# Patient Record
Sex: MALE | Race: BLACK OR AFRICAN AMERICAN | Employment: UNEMPLOYED | URBAN - METROPOLITAN AREA
[De-identification: names, ages, dates, MRNs, and addresses within clinical notes are randomized per-mention and may not be internally consistent; named-entity substitution may affect disease eponyms.]

---

## 2017-01-09 ENCOUNTER — HOSPITAL ENCOUNTER (OUTPATIENT)
Dept: LAB | Age: 62
Discharge: HOME | End: 2017-01-09

## 2017-01-09 LAB — BNP SERPL-MCNC: 855 PG/ML (ref 0–100)

## 2017-01-09 PROCEDURE — 83880 ASSAY OF NATRIURETIC PEPTIDE: CPT | Performed by: INTERNAL MEDICINE

## 2017-01-25 RX ORDER — CYCLOBENZAPRINE HCL 10 MG
TABLET ORAL
Qty: 90 TAB | Refills: 0 | Status: SHIPPED | OUTPATIENT
Start: 2017-01-25 | End: 2018-03-28 | Stop reason: SDUPTHER

## 2017-02-14 ENCOUNTER — TELEPHONE (OUTPATIENT)
Dept: SURGERY | Age: 62
End: 2017-02-14

## 2017-02-14 NOTE — TELEPHONE ENCOUNTER
Mr Sweetie Dada has appt this afternoon with [facility]. Faxed office notes dated 12/8/16, 10/27/16, 8/4/16 & op note dated 10/11/16 to [phone], confirmation received.

## 2017-03-24 ENCOUNTER — HOSPITAL ENCOUNTER (OUTPATIENT)
Dept: LAB | Age: 62
Discharge: HOME | End: 2017-03-24

## 2017-03-24 LAB — BNP SERPL-MCNC: 889 PG/ML (ref 0–100)

## 2017-03-24 PROCEDURE — 83880 ASSAY OF NATRIURETIC PEPTIDE: CPT | Performed by: INTERNAL MEDICINE

## 2017-05-25 ENCOUNTER — OFFICE VISIT (OUTPATIENT)
Dept: INTERNAL MEDICINE CLINIC | Age: 62
End: 2017-05-25

## 2017-05-25 VITALS
DIASTOLIC BLOOD PRESSURE: 84 MMHG | WEIGHT: 171 LBS | HEIGHT: 68 IN | SYSTOLIC BLOOD PRESSURE: 110 MMHG | OXYGEN SATURATION: 93 % | BODY MASS INDEX: 25.91 KG/M2 | TEMPERATURE: 98.3 F | RESPIRATION RATE: 16 BRPM | HEART RATE: 99 BPM

## 2017-05-25 DIAGNOSIS — Z00.00 MEDICARE ANNUAL WELLNESS VISIT, SUBSEQUENT: Primary | ICD-10-CM

## 2017-05-25 DIAGNOSIS — I10 UNSPECIFIED ESSENTIAL HYPERTENSION: ICD-10-CM

## 2017-05-25 DIAGNOSIS — J18.9 PNEUMONIA OF LEFT LOWER LOBE DUE TO INFECTIOUS ORGANISM: ICD-10-CM

## 2017-05-25 LAB
CHOLEST SERPL-MCNC: 161 MG/DL
HDLC SERPL-MCNC: 39 MG/DL
LDL CHOLESTEROL POC: 95
NON-HDL GOAL (POC): 122
TCHOL/HDL RATIO (POC): 4.1
TRIGL SERPL-MCNC: 137 MG/DL

## 2017-05-25 RX ORDER — CIPROFLOXACIN 500 MG/1
500 TABLET ORAL 2 TIMES DAILY
Qty: 14 TAB | Refills: 0 | Status: SHIPPED | OUTPATIENT
Start: 2017-05-25 | End: 2017-06-01

## 2017-05-25 RX ORDER — LEVOFLOXACIN 500 MG/1
500 TABLET, FILM COATED ORAL DAILY
Qty: 7 TAB | Refills: 0 | Status: SHIPPED | OUTPATIENT
Start: 2017-05-25 | End: 2017-05-25 | Stop reason: ALTCHOICE

## 2017-05-25 NOTE — MR AVS SNAPSHOT
Visit Information Date & Time Provider Department Dept. Phone Encounter #  
 5/25/2017 11:30 AM Sejal Mejia MD [address] [facility] [phone] [id number] Follow-up Instructions Return in about 1 week (around 6/1/2017), or if symptoms worsen or fail to improve. Upcoming Health Maintenance Date Due Pneumococcal 19-64 Highest Risk (1 of 3 - PCV13) 7/28[birth year]1972 FOBT Q 1 YEAR AGE 50-75 9/18/2015 DTaP/Tdap/Td series (1 - Tdap) 5/24/2018* INFLUENZA AGE 9 TO ADULT 8/1/2017 MEDICARE YEARLY EXAM 5/26/2018 *Topic was postponed. The date shown is not the original due date. Allergies as of 5/25/2017  Review Complete On: 5/25/2017 By: Tomasa Owens LPN Severity Noted Reaction Type Reactions Other Food High 09/24/2015    Anaphylaxis Shell fish \"LUNGS, & THROAT CLOSES UP\" MILK & CHEESE Milk High 10/13/2015    Anaphylaxis Shellfish Derived High 10/13/2015    Anaphylaxis Iodinated Contrast Media - Oral And Iv Dye  09/12/2012    Other (comments) Peanut  09/12/2012    Shortness of Breath Penicillins  02/18/2011    Unknown (comments) Prednisone  10/11/2016    Swelling Current Immunizations  Reviewed on 10/16/2015 No immunizations on file. Not reviewed this visit You Were Diagnosed With   
  
 Codes Comments Medicare annual wellness visit, subsequent    -  Primary ICD-10-CM: Z00.00 ICD-9-CM: V70.0 Unspecified essential hypertension     ICD-10-CM: I10 
ICD-9-CM: 401.9 Pneumonia of left lower lobe due to infectious organism     ICD-10-CM: J18.1 ICD-9-CM: 358 Vitals BP Pulse Temp Resp Height(growth percentile) Weight(growth percentile) 110/84 99 98.3 °F (36.8 °C) (Oral) 16 5' 8\" (1.727 m) 171 lb (77.6 kg) SpO2 BMI Smoking Status 93% 26 kg/m2 Never Smoker BMI and BSA Data Body Mass Index Body Surface Area  
 26 kg/m 2 1.93 m 2 Preferred Pharmacy Pharmacy Name Phone [facility] [address] [phone] Your Updated Medication List  
  
   
This list is accurate as of: 5/25/17 12:08 PM.  Always use your most recent med list.  
  
  
  
  
 acetaminophen 325 mg tablet Commonly known as:  TYLENOL Take 2 Tabs by mouth every six (6) hours. Indications: PAIN  
  
 albuterol 90 mcg/actuation inhaler Commonly known as:  PROVENTIL HFA, VENTOLIN HFA, PROAIR HFA Take 2 Puffs by inhalation every four (4) hours as needed for Wheezing. aspirin delayed-release 81 mg tablet Take 81 mg by mouth daily. calcium acetate 667 mg Cap Commonly known as:  PHOSLO Take 3 Caps by mouth three (3) times daily (with meals). cyclobenzaprine 10 mg tablet Commonly known as:  FLEXERIL  
TAKE ONE TABLET BY MOUTH THREE TIMES DAILY AS NEEDED FOR MUSCLE SPASM  
  
 levoFLOXacin 500 mg tablet Commonly known as:  Raynold Naomy Take 1 Tab by mouth daily. losartan 25 mg tablet Commonly known as:  COZAAR Take  by mouth daily. metoprolol tartrate 25 mg tablet Commonly known as:  LOPRESSOR Take 25 mg by mouth daily. OTHER(NON-FORMULARY) RENAL VITAMIN WITH VIT D PLUS Prescriptions Sent to Pharmacy Refills  
 levoFLOXacin (LEVAQUIN) 500 mg tablet 0 Sig: Take 1 Tab by mouth daily. Class: Normal  
 Pharmacy: [id number] [place] [address] 36, [facility] Ph #: [phone] Route: Oral  
  
We Performed the Following AMB POC LIPID PROFILE [56352 CPT(R)] Follow-up Instructions Return in about 1 week (around 6/1/2017), or if symptoms worsen or fail to improve. Patient Instructions Cegal Activation Thank you for requesting access to Cegal. Please follow the instructions below to securely access and download your online medical record.  Cegal allows you to send messages to your doctor, view your test results, renew your prescriptions, schedule appointments, and more. How Do I Sign Up? 1. In your internet browser, go to [email or web address] 
2. Click on the First Time User? Click Here link in the Sign In box. You will be redirect to the New Member Sign Up page. 3. Enter your StudyCloud Access Code exactly as it appears below. You will not need to use this code after youve completed the sign-up process. If you do not sign up before the expiration date, you must request a new code. StudyCloud Access Code: [email or web address] Expires: 2017 12:05 PM (This is the date your StudyCloud access code will ) 4. Enter the last four digits of your Social Security Number (xxxx) and Date of Birth (mm/dd/yyyy) as indicated and click Submit. You will be taken to the next sign-up page. 5. Create a StudyCloud ID. This will be your StudyCloud login ID and cannot be changed, so think of one that is secure and easy to remember. 6. Create a StudyCloud password. You can change your password at any time. 7. Enter your Password Reset Question and Answer. This can be used at a later time if you forget your password. 8. Enter your e-mail address. You will receive e-mail notification when new information is available in [address] 9. Click Sign Up. You can now view and download portions of your medical record. 10. Click the Download Summary menu link to download a portable copy of your medical information. Additional Information If you have questions, please visit the Frequently Asked Questions section of the StudyCloud website at [email or web address] Remember, StudyCloud is NOT to be used for urgent needs. For medical emergencies, dial 911. \ Introducing [facility]! Arben Hernández introduces StudyCloud patient portal. Now you can access parts of your medical record, email your doctor's office, and request medication refills online. 1. In your internet browser, go to [email or web address] 2. Click on the First Time User? Click Here link in the Sign In box. You will see the New Member Sign Up page. 3. Enter your Avtodoria Access Code exactly as it appears below. You will not need to use this code after youve completed the sign-up process. If you do not sign up before the expiration date, you must request a new code. · Avtodoria Access Code: [email or web address] Expires: 8/23/2017 12:05 PM 
 
4. Enter the last four digits of your Social Security Number (xxxx) and Date of Birth (mm/dd/yyyy) as indicated and click Submit. You will be taken to the next sign-up page. 5. Create a Avtodoria ID. This will be your Avtodoria login ID and cannot be changed, so think of one that is secure and easy to remember. 6. Create a Avtodoria password. You can change your password at any time. 7. Enter your Password Reset Question and Answer. This can be used at a later time if you forget your password. 8. Enter your e-mail address. You will receive e-mail notification when new information is available in [address] 9. Click Sign Up. You can now view and download portions of your medical record. 10. Click the Download Summary menu link to download a portable copy of your medical information. If you have questions, please visit the Frequently Asked Questions section of the Avtodoria website. Remember, Avtodoria is NOT to be used for urgent needs. For medical emergencies, dial 911. Now available from your iPhone and Android! Please provide this summary of care documentation to your next provider. Your primary care clinician is listed as Ivory Sosa. If you have any questions after today's visit, please call [phone].

## 2017-05-25 NOTE — PATIENT INSTRUCTIONS
Seer Activation    Thank you for requesting access to Seer. Please follow the instructions below to securely access and download your online medical record. Seer allows you to send messages to your doctor, view your test results, renew your prescriptions, schedule appointments, and more. How Do I Sign Up? 1. In your internet browser, go to [email or web address]  2. Click on the First Time User? Click Here link in the Sign In box. You will be redirect to the New Member Sign Up page. 3. Enter your Seer Access Code exactly as it appears below. You will not need to use this code after youve completed the sign-up process. If you do not sign up before the expiration date, you must request a new code. Seer Access Code: [email or web address]  Expires: 2017 12:05 PM (This is the date your Seer access code will )    4. Enter the last four digits of your Social Security Number (xxxx) and Date of Birth (mm/dd/yyyy) as indicated and click Submit. You will be taken to the next sign-up page. 5. Create a Seer ID. This will be your Seer login ID and cannot be changed, so think of one that is secure and easy to remember. 6. Create a Seer password. You can change your password at any time. 7. Enter your Password Reset Question and Answer. This can be used at a later time if you forget your password. 8. Enter your e-mail address. You will receive e-mail notification when new information is available in [address] 9. Click Sign Up. You can now view and download portions of your medical record. 10. Click the Download Summary menu link to download a portable copy of your medical information. Additional Information    If you have questions, please visit the Frequently Asked Questions section of the Seer website at [email or web address] Remember, Seer is NOT to be used for urgent needs. For medical emergencies, dial 911.       \

## 2017-05-25 NOTE — PROGRESS NOTES
Carina Lee is a 59 y.o. male and presents with Hospital Follow Up; Annual Wellness Visit; Hypertension; and Pneumonia  . Subjective:  Hypertension Review:  The patient has essential hypertension  Diet and Lifestyle: generally follows a  low sodium diet, exercises sporadically  Home BP Monitoring: is not measured at home. Pertinent ROS: taking medications as instructed, no medication side effects noted, no TIA's, no chest pain on exertion, no dyspnea on exertion, no swelling of ankles. He was recently treated for a pneumonia. He remains congested    Chronic Renal Disease Review:  HPI: Carina Lee, ([id number]) is a 59 y.o. male who returns for follow up of   chronic renal disease caused by diabetic nephropathy. te patient  is followed by renal  The patient has experienced the following symptoms: no problems   The patient has not experienced any of the following symptoms: no problems   Prescribed diet is JORDY   The following actions have been prescribed for slowing the   progression of CRF: cardiovascular risk factor reduction including none   The patientis  on dialysis                        Carina Lee is a 59 y.o. male and presents for annual Medicare Wellness Visit. Problem List: Reviewed with patient and discussed risk factors.     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    CKD (chronic kidney disease) stage V requiring chronic [facility] ([facility] N18.6, Z99.2       Current medical providers:  Patient Care Team:  Madie Pantoja MD as PCP - General (Internal Medicine)  Marichuy Wilcox Jennifer Chahal, RN as Nurse Navigator  Lady Jose MD (General Surgery)  Alina Cueto NP as Nurse Practitioner (General Surgery)  Boom Fontanez MD as Surgeon (General Surgery)    PSH: Reviewed with patient  Past Surgical History:   Procedure Laterality Date    CARDIAC SURG PROCEDURE UNLIST  4/29/15    CARDIAC CATHGERIZATION    HX GI      COLONOSCOPY    HX HEENT      TONSILLECTOMY    HX HERNIA REPAIR  3/8/16    Repair of right inguinal hernia with mesh,Repair of ventral incisional hernia with mesh    HX ORTHOPAEDIC Right 10/13/15     THR    HX ORTHOPAEDIC      left hip    HX OTHER SURGICAL  10/11/2016    revision of AV fistula left arm with repair of pseudoaneurysm    HX SMALL BOWEL RESECTION      HX UROLOGICAL Bilateral 2005    REMOVAL OF KIDNEYS    HX VASCULAR ACCESS      LT ARM WITH PORTS FOR DYALISIS        SH: Reviewed with patient  Social History   Substance Use Topics    Smoking status: Never Smoker    Smokeless tobacco: Never Used    Alcohol use No       FH: Reviewed with patient  Family History   Problem Relation Age of Onset    Cancer Mother      BREAST    Cancer Sister      THROAT    Stroke Brother     Anesth Problems Neg Hx        Medications/Allergies: Reviewed with patient  Current Outpatient Prescriptions on File Prior to Visit   Medication Sig Dispense Refill    cyclobenzaprine (FLEXERIL) 10 mg tablet TAKE ONE TABLET BY MOUTH THREE TIMES DAILY AS NEEDED FOR MUSCLE SPASM 90 Tab 0    losartan (COZAAR) 25 mg tablet Take  by mouth daily.  metoprolol (LOPRESSOR) 25 mg tablet Take 25 mg by mouth daily.  calcium acetate (PHOSLO) 667 mg cap Take 3 Caps by mouth three (3) times daily (with meals).  aspirin delayed-release 81 mg tablet Take 81 mg by mouth daily.  acetaminophen (TYLENOL) 325 mg tablet Take 2 Tabs by mouth every six (6) hours.  Indications: PAIN 60 Tab 0    OTHER,NON-FORMULARY, RENAL VITAMIN WITH VIT D PLUS      albuterol (PROVENTIL HFA, VENTOLIN HFA, PROAIR HFA) 90 mcg/actuation inhaler Take 2 Puffs by inhalation every four (4) hours as needed for Wheezing. 1 Inhaler 12     No current facility-administered medications on file prior to visit. Allergies   Allergen Reactions    Other Food Anaphylaxis     Shell fish \"LUNGS, & THROAT CLOSES UP\"  MILK & CHEESE    Milk Anaphylaxis    Shellfish Derived Anaphylaxis    Iodinated Contrast Media - Oral And Iv Dye Other (comments)    Peanut Shortness of Breath    Penicillins Unknown (comments)    Prednisone Swelling       Objective:  Visit Vitals    /84    Pulse 99    Temp 98.3 °F (36.8 °C) (Oral)    Resp 16    Ht 5' 8\" (1.727 m)    Wt 171 lb (77.6 kg)    SpO2 93%    BMI 26 kg/m2    Body mass index is 26 kg/(m^2). Assessment of cognitive impairment: Alert and oriented x 3    Depression Screen:   PHQ over the last two weeks 5/25/2017   PHQ Not Done Patient Decline   Little interest or pleasure in doing things Not at all   Feeling down, depressed or hopeless Not at all   Total Score PHQ 2 0       Fall Risk Assessment:  No flowsheet data found. Functional Ability:   Does the patient exhibit a steady gait? yes   How long did it take the patient to get up and walk from a sitting position? seconds   Is the patient self reliant?  (ie can do own laundry, meals, household chores)  yes     Does the patient handle his/her own medications? yes     Does the patient handle his/her own money? yes     Is the patients home safe (ie good lighting, handrails on stairs and bath, etc.)? yes     Did you notice or did patient express any hearing difficulties? no     Did you notice or did patient express any vision difficulties?   no     Were distance and reading eye charts used? no       Advance Care Planning:   Patient was offered the opportunity to discuss advance care planning:  yes     Does patient have an Advance Directive:  no   If no, did you provide information on Caring Connections?   yes Plan:      Orders Placed This Encounter    AMB POC LIPID PROFILE    levoFLOXacin (LEVAQUIN) 500 mg tablet       Health Maintenance   Topic Date Due    Pneumococcal 19-64 Highest Risk (1 of 3 - PCV13) 07/28/1972    FOBT Q 1 YEAR AGE 50-75  09/18/2015    DTaP/Tdap/Td series (1 - Tdap) 05/24/2018 (Originally 7/28/1976)    INFLUENZA AGE 9 TO ADULT  08/01/2017    MEDICARE YEARLY EXAM  05/26/2018    Hepatitis C Screening  Completed    ZOSTER VACCINE AGE 60>  Addressed       *Patient verbalized understanding and agreement with the plan. A copy of the After Visit Summary with personalized health plan was given to the patient today. Review of Systems  Constitutional: negative for fevers, chills, anorexia and weight loss  Eyes:   negative for visual disturbance and irritation  ENT:   negative for tinnitus,sore throat,nasal congestion,ear pains. hoarseness  Respiratory:   cough, hemoptyss, dyspnea,  CV:   negative for chest pain, palpitations, lower extremity edema  GI:   negative for nausea, vomiting, diarrhea, abdominal pain,melena  Endo:               negative for polyuria,polydipsia,polyphagia,heat intolerance  Genitourinary: negative for frequency, dysuria and hematuria  Integument:  negative for rash and pruritus  Hematologic:  negative for easy bruising and gum/nose bleeding  Musculoskel: negative for myalgias, arthralgias, back pain, muscle weakness, joint pain  Neurological:  negative for headaches, dizziness, vertigo, memory problems and gait   Behavl/Psych: negative for feelings of anxiety, depression, mood changes    Past Medical History:   Diagnosis Date    Adverse effect of anesthesia     \"MY BLOOD PRESSURE DROPS ,\"    Alzheimer's disease 2/18/2011    PT DENIES    Chronic kidney disease     KIDNEYS FAILED R/T HTN    Chronic pain     LEFT HIP PAIN    [facility] failure ([facility] 6/2015    CHF    Hemodialysis patient ([facility]     M-W-F    Osteoarthrosis, unspecified whether generalized or localized, unspecified site 2/18/2011    Other unknown and unspecified cause of morbidity or mortality     PT DOSES OFF FREQUENTLY    PUD (peptic ulcer disease)     Renal Disease 2/18/2011    Right inguinal hernia 3/1/2016    Unspecified asthma 2/18/2011    Unspecified essential hypertension 2/18/2011    Ventral incisional hernia 3/1/2016     Past Surgical History:   Procedure Laterality Date    CARDIAC SURG PROCEDURE UNLIST  4/29/15    CARDIAC CATHGERIZATION    HX GI      COLONOSCOPY    HX HEENT      TONSILLECTOMY    HX HERNIA REPAIR  3/8/16    Repair of right inguinal hernia with mesh,Repair of ventral incisional hernia with mesh    HX ORTHOPAEDIC Right 10/13/15     THR    HX ORTHOPAEDIC      left hip    HX OTHER SURGICAL  10/11/2016    revision of AV fistula left arm with repair of pseudoaneurysm    HX SMALL BOWEL RESECTION      HX UROLOGICAL Bilateral 2005    REMOVAL OF KIDNEYS    HX VASCULAR ACCESS      LT ARM WITH PORTS FOR DYALISIS     Social History     Social History    Marital status:      Spouse name: N/A    Number of children: N/A    Years of education: N/A     Social History Main Topics    Smoking status: Never Smoker    Smokeless tobacco: Never Used    Alcohol use No    Drug use: No    Sexual activity: Not Asked     Other Topics Concern    None     Social History Narrative     Family History   Problem Relation Age of Onset    Cancer Mother      BREAST    Cancer Sister      THROAT    Stroke Brother     Anesth Problems Neg Hx      Current Outpatient Prescriptions   Medication Sig Dispense Refill    levoFLOXacin (LEVAQUIN) 500 mg tablet Take 1 Tab by mouth daily. 7 Tab 0    cyclobenzaprine (FLEXERIL) 10 mg tablet TAKE ONE TABLET BY MOUTH THREE TIMES DAILY AS NEEDED FOR MUSCLE SPASM 90 Tab 0    losartan (COZAAR) 25 mg tablet Take  by mouth daily.  metoprolol (LOPRESSOR) 25 mg tablet Take 25 mg by mouth daily.       calcium acetate (PHOSLO) 667 mg cap Take 3 Caps by mouth three (3) times daily (with meals).  aspirin delayed-release 81 mg tablet Take 81 mg by mouth daily.  acetaminophen (TYLENOL) 325 mg tablet Take 2 Tabs by mouth every six (6) hours. Indications: PAIN 60 Tab 0    OTHER,NON-FORMULARY, RENAL VITAMIN WITH VIT D PLUS      albuterol (PROVENTIL HFA, VENTOLIN HFA, PROAIR HFA) 90 mcg/actuation inhaler Take 2 Puffs by inhalation every four (4) hours as needed for Wheezing. 1 Inhaler 12     Allergies   Allergen Reactions    Other Food Anaphylaxis     Shell fish \"LUNGS, & THROAT CLOSES UP\"  MILK & CHEESE    Milk Anaphylaxis    Shellfish Derived Anaphylaxis    Iodinated Contrast Media - Oral And Iv Dye Other (comments)    Peanut Shortness of Breath    Penicillins Unknown (comments)    Prednisone Swelling       Objective:  Visit Vitals    /84    Pulse 99    Temp 98.3 °F (36.8 °C) (Oral)    Resp 16    Ht 5' 8\" (1.727 m)    Wt 171 lb (77.6 kg)    SpO2 93%    BMI 26 kg/m2     Physical Exam:   General appearance - alert, well appearing, and in no distress  Mental status - alert, oriented to person, place, and time  EYE-ILIANA, EOMI, corneas normal, no foreign bodies  ENT-ENT exam normal, no neck nodes or sinus tenderness  Nose - normal and patent, no erythema, discharge or polyps  Mouth - mucous membranes moist, pharynx normal without lesions  Neck - supple, no significant adenopathy   Chest -LT. MID- rhonchi, symmetric air entry   Heart - normal rate, regular rhythm, normal S1, S2, no murmurs, rubs, clicks or gallops   Abdomen - soft, nontender, nondistended, no masses or organomegaly  Lymph- no adenopathy palpable  Ext-peripheral pulses normal, no pedal edema, no clubbing or cyanosis  Skin-Warm and dry.  no hyperpigmentation, vitiligo, or suspicious lesions  Neuro -alert, oriented, normal speech, no focal findings or movement disorder noted  Neck-normal C-spine, no tenderness, full ROM without pain  Feet-no nail deformities or callus formation with good pulses noted      Results for orders placed or performed during the hospital encounter of 17   BNP   Result Value Ref Range     (H) 0 - 100 pg/mL       Assessment/Plan:    ICD-10-CM ICD-9-CM    1. Medicare annual wellness visit, subsequent Z00.00 V70.0 AMB POC LIPID PROFILE   2. Unspecified essential hypertension I10 401.9 AMB POC LIPID PROFILE   3. Pneumonia of left lower lobe due to infectious organism J18.1 486      Orders Placed This Encounter    AMB POC LIPID PROFILE    levoFLOXacin (LEVAQUIN) 500 mg tablet     Sig: Take 1 Tab by mouth daily. Dispense:  7 Tab     Refill:  0     lose weight, increase physical activity, follow low fat diet, follow low salt diet  Patient Instructions   MyChart Activation    Thank you for requesting access to [employer] [facility]. Please follow the instructions below to securely access and download your online medical record. [employer] allows you to send messages to your doctor, view your test results, renew your prescriptions, schedule appointments, and more. How Do I Sign Up? 1. In your internet browser, go to [email or web address]  2. Click on the First Time User? Click Here link in the Sign In box. You will be redirect to the New Member Sign Up page. 3. Enter your [employer] Access Code exactly as it appears below. You will not need to use this code after youve completed the sign-up process. If you do not sign up before the expiration date, you must request a new code. [employer] [facility] Access Code: [email or web address]  Expires: 2017 12:05 PM (This is the date your [employer] access code will )    4. Enter the last four digits of your Social Security Number (xxxx) and Date of Birth (mm/dd/yyyy) as indicated and click Submit. You will be taken to the next sign-up page. 5. Create a [employer] [facility] ID. This will be your [employer] login ID and cannot be changed, so think of one that is secure and easy to remember. 6. Create a [employer] password.  You can change your password at any time. 7. Enter your Password Reset Question and Answer. This can be used at a later time if you forget your password. 8. Enter your e-mail address. You will receive e-mail notification when new information is available in [address] 9. Click Sign Up. You can now view and download portions of your medical record. 10. Click the Download Summary menu link to download a portable copy of your medical information. Additional Information    If you have questions, please visit the Frequently Asked Questions section of the [employer] website at [email or web address] Remember, [employer] is NOT to be used for urgent needs. For medical emergencies, dial 911. \     Follow-up Disposition:  Return in about 1 week (around 6/1/2017), or if symptoms worsen or fail to improve. I have reviewed with the patient details of the assessment and plan and all questions were answered. Relevent patient education was performed    An After Visit Summary was printed and given to the patient.

## 2017-08-07 ENCOUNTER — HOSPITAL ENCOUNTER (OUTPATIENT)
Dept: LAB | Age: 62
Discharge: HOME | End: 2017-08-07

## 2017-08-07 LAB — BNP SERPL-MCNC: 918 PG/ML (ref 0–100)

## 2017-08-07 PROCEDURE — 83880 ASSAY OF NATRIURETIC PEPTIDE: CPT | Performed by: INTERNAL MEDICINE

## 2017-08-31 ENCOUNTER — OFFICE VISIT (OUTPATIENT)
Dept: INTERNAL MEDICINE CLINIC | Age: 62
End: 2017-08-31

## 2017-08-31 VITALS
OXYGEN SATURATION: 94 % | TEMPERATURE: 98.5 F | HEART RATE: 83 BPM | RESPIRATION RATE: 20 BRPM | SYSTOLIC BLOOD PRESSURE: 120 MMHG | HEIGHT: 68 IN | WEIGHT: 182 LBS | BODY MASS INDEX: 27.58 KG/M2 | DIASTOLIC BLOOD PRESSURE: 80 MMHG

## 2017-08-31 DIAGNOSIS — Z99.2 ESRD (END STAGE RENAL DISEASE) ON DIALYSIS (HCC): ICD-10-CM

## 2017-08-31 DIAGNOSIS — M16.11 PRIMARY OSTEOARTHRITIS OF RIGHT HIP: ICD-10-CM

## 2017-08-31 DIAGNOSIS — J45.41 MODERATE PERSISTENT ASTHMA WITH ACUTE EXACERBATION: ICD-10-CM

## 2017-08-31 DIAGNOSIS — I10 ESSENTIAL HYPERTENSION: ICD-10-CM

## 2017-08-31 DIAGNOSIS — N18.6 ESRD (END STAGE RENAL DISEASE) ON DIALYSIS (HCC): ICD-10-CM

## 2017-08-31 DIAGNOSIS — N18.6 CKD (CHRONIC KIDNEY DISEASE) STAGE V REQUIRING CHRONIC DIALYSIS (HCC): Primary | ICD-10-CM

## 2017-08-31 DIAGNOSIS — Z99.2 CKD (CHRONIC KIDNEY DISEASE) STAGE V REQUIRING CHRONIC DIALYSIS (HCC): Primary | ICD-10-CM

## 2017-08-31 RX ORDER — PREDNISONE 10 MG/1
TABLET ORAL
Qty: 21 TAB | Refills: 3 | Status: ON HOLD | OUTPATIENT
Start: 2017-08-31 | End: 2019-01-29

## 2017-08-31 RX ORDER — FLUTICASONE PROPIONATE AND SALMETEROL 232; 14 UG/1; UG/1
1 POWDER, METERED RESPIRATORY (INHALATION) 2 TIMES DAILY
Qty: 1 INHALER | Refills: 12 | Status: ON HOLD
Start: 2017-08-31 | End: 2018-04-06

## 2017-08-31 NOTE — PATIENT INSTRUCTIONS
[employer] Activation    Thank you for requesting access to [employer]. Please follow the instructions below to securely access and download your online medical record. [employer] allows you to send messages to your doctor, view your test results, renew your prescriptions, schedule appointments, and more. How Do I Sign Up? 1. In your internet browser, go to [email or web address]  2. Click on the First Time User? Click Here link in the Sign In box. You will be redirect to the New Member Sign Up page. 3. Enter your [employer] Access Code exactly as it appears below. You will not need to use this code after youve completed the sign-up process. If you do not sign up before the expiration date, you must request a new code. [employer] Access Code: [email or web address]  Expires: 2017  3:30 PM (This is the date your [employer] access code will )    4. Enter the last four digits of your Social Security Number (xxxx) and Date of Birth (mm/dd/yyyy) as indicated and click Submit. You will be taken to the next sign-up page. 5. Create a [employer] ID. This will be your [employer] login ID and cannot be changed, so think of one that is secure and easy to remember. 6. Create a [employer] password. You can change your password at any time. 7. Enter your Password Reset Question and Answer. This can be used at a later time if you forget your password. 8. Enter your e-mail address. You will receive e-mail notification when new information is available in [address] 9. Click Sign Up. You can now view and download portions of your medical record. 10. Click the Download Summary menu link to download a portable copy of your medical information. Additional Information    If you have questions, please visit the Frequently Asked Questions section of the [employer] website at [email or web address] Remember, [employer] is NOT to be used for urgent needs. For medical emergencies, dial 911.

## 2017-08-31 NOTE — MR AVS SNAPSHOT
Visit Information Date & Time Provider Department Dept. Phone Encounter #  
 8/31/2017  2:45 PM Matt Schmidt MD [facility] [phone] [id number] Follow-up Instructions Return in about 4 weeks (around 9/28/2017), or if symptoms worsen or fail to improve. Upcoming Health Maintenance Date Due Pneumococcal 19-64 Highest Risk (1 of 3 - PCV13) 7/28/1972 FOBT Q 1 YEAR AGE 50-75 9/18/2015 INFLUENZA AGE 9 TO ADULT 8/1/2017 DTaP/Tdap/Td series (1 - Tdap) 5/24/2018* MEDICARE YEARLY EXAM 5/26/2018 *Topic was postponed. The date shown is not the original due date. Allergies as of 8/31/2017  Review Complete On: 8/31/2017 By: Matt Schmidt MD  
  
 Severity Noted Reaction Type Reactions Other Food High 09/24/2015    Anaphylaxis Shell fish \"LUNGS, & THROAT CLOSES UP\" MILK & CHEESE Milk High 10/13/2015    Anaphylaxis Shellfish Derived High 10/13/2015    Anaphylaxis Iodinated Contrast- Oral And Iv Dye  09/12/2012    Other (comments) Peanut  09/12/2012    Shortness of Breath Penicillins  02/18/2011    Unknown (comments) Current Immunizations  Reviewed on 10/16/2015 No immunizations on file. Not reviewed this visit You Were Diagnosed With   
  
 Codes Comments CKD (chronic kidney disease) stage V requiring chronic dialysis ([facility]    -  Primary ICD-10-CM: N18.6, Z99.2 ICD-9-CM: 585.6, V45.11 Essential hypertension     ICD-10-CM: I10 
ICD-9-CM: 401.9 Primary osteoarthritis of right hip     ICD-10-CM: M16.11 
ICD-9-CM: 715.15   
 ESRD (end stage renal disease) on dialysis ([facility]     ICD-10-CM: N18.6, Z99.2 ICD-9-CM: 585.6, V45.11 Moderate persistent asthma with acute exacerbation     ICD-10-CM: J45.41 
ICD-9-CM: 493.92 Vitals BP Pulse Temp Resp Height(growth percentile) Weight(growth percentile)  120/80 (BP 1 Location: Right arm, BP Patient Position: Sitting) 83 98.5 °F (36.9 °C) 20 5' 8\" (1.727 m) 182 lb (82.6 kg) SpO2 BMI Smoking Status 94% 27.67 kg/m2 Never Smoker Vitals History BMI and BSA Data Body Mass Index Body Surface Area  
 27.67 kg/m 2 1.99 m 2 Preferred Pharmacy Pharmacy Name Phone [facility] [address] [phone] Your Updated Medication List  
  
   
This list is accurate as of: 8/31/17  3:44 PM.  Always use your most recent med list.  
  
  
  
  
 acetaminophen 325 mg tablet Commonly known as:  TYLENOL Take 2 Tabs by mouth every six (6) hours. Indications: PAIN  
  
 albuterol 90 mcg/actuation inhaler Commonly known as:  PROVENTIL HFA, VENTOLIN HFA, PROAIR HFA Take 2 Puffs by inhalation every four (4) hours as needed for Wheezing. aspirin delayed-release 81 mg tablet Take 81 mg by mouth daily. calcium acetate 667 mg Cap Commonly known as:  PHOSLO Take 3 Caps by mouth three (3) times daily (with meals). cyclobenzaprine 10 mg tablet Commonly known as:  FLEXERIL  
TAKE ONE TABLET BY MOUTH THREE TIMES DAILY AS NEEDED FOR MUSCLE SPASM  
  
 fluticasone-salmeterol 232-14 mcg/actuation Aepb Commonly known as:  Lisa Back Take 1 Inhaler by inhalation two (2) times a day. losartan 25 mg tablet Commonly known as:  COZAAR Take  by mouth daily. metoprolol tartrate 25 mg tablet Commonly known as:  LOPRESSOR Take 25 mg by mouth daily. OTHER(NON-FORMULARY) RENAL VITAMIN WITH VIT D PLUS We Performed the Following OCCULT BLOOD, IMMUNOASSAY (FIT) [id number] CPT(R)] Follow-up Instructions Return in about 4 weeks (around 9/28/2017), or if symptoms worsen or fail to improve. Patient Instructions PersistIQ Activation Thank you for requesting access to PersistIQ. Please follow the instructions below to securely access and download your online medical record.  PersistIQ allows you to send messages to your doctor, view your test results, renew your prescriptions, schedule appointments, and more. How Do I Sign Up? 1. In your internet browser, go to [email or web address] 
2. Click on the First Time User? Click Here link in the Sign In box. You will be redirect to the New Member Sign Up page. 3. Enter your EyeEm Access Code exactly as it appears below. You will not need to use this code after youve completed the sign-up process. If you do not sign up before the expiration date, you must request a new code. EyeEm Access Code: [email or web address] Expires: 2017  3:30 PM (This is the date your EyeEm access code will ) 4. Enter the last four digits of your Social Security Number (xxxx) and Date of Birth (mm/dd/yyyy) as indicated and click Submit. You will be taken to the next sign-up page. 5. Create a EyeEm ID. This will be your EyeEm login ID and cannot be changed, so think of one that is secure and easy to remember. 6. Create a EyeEm password. You can change your password at any time. 7. Enter your Password Reset Question and Answer. This can be used at a later time if you forget your password. 8. Enter your e-mail address. You will receive e-mail notification when new information is available in [address] 9. Click Sign Up. You can now view and download portions of your medical record. 10. Click the Download Summary menu link to download a portable copy of your medical information. Additional Information If you have questions, please visit the Frequently Asked Questions section of the EyeEm website at [email or web address] Remember, EyeEm is NOT to be used for urgent needs. For medical emergencies, dial 911. Introducing [facility]! Hawk Benites introduces EyeEm patient portal. Now you can access parts of your medical record, email your doctor's office, and request medication refills online. 1. In your internet browser, go to [email or web address] 2. Click on the First Time User? Click Here link in the Sign In box. You will see the New Member Sign Up page. 3. Enter your [employer] Access Code exactly as it appears below. You will not need to use this code after youve completed the sign-up process. If you do not sign up before the expiration date, you must request a new code. · [employer] Access Code: [email or web address] Expires: 11/29/2017  3:30 PM 
 
4. Enter the last four digits of your Social Security Number (xxxx) and Date of Birth (mm/dd/yyyy) as indicated and click Submit. You will be taken to the next sign-up page. 5. Create a WrapMailt ID. This will be your [employer] login ID and cannot be changed, so think of one that is secure and easy to remember. 6. Create a [employer] password. You can change your password at any time. 7. Enter your Password Reset Question and Answer. This can be used at a later time if you forget your password. 8. Enter your e-mail address. You will receive e-mail notification when new information is available in [address] 9. Click Sign Up. You can now view and download portions of your medical record. 10. Click the Download Summary menu link to download a portable copy of your medical information. If you have questions, please visit the Frequently Asked Questions section of the [employer] website. Remember, [employer] is NOT to be used for urgent needs. For medical emergencies, dial 911. Now available from your iPhone and Android! Please provide this summary of care documentation to your next provider. Your primary care clinician is listed as Mack Larson. If you have any questions after today's visit, please call [phone].

## 2017-08-31 NOTE — PROGRESS NOTES
Jose Manuel Broussard is a 57 y.o. male and presents with Hospital Follow Up; Hypertension; and Chronic Kidney Disease  . Subjective:  Hypertension Review:  The patient has essential hypertension  Diet and Lifestyle: generally follows a  low sodium diet, exercises sporadically  Home BP Monitoring: is not measured at home. Pertinent ROS: taking medications as instructed, no medication side effects noted, no TIA's, no chest pain on exertion, no dyspnea on exertion, no swelling of ankles. Chronic Renal Disease Review:  HPI: Jose Manuel Broussard, ([id number]) is a 57 y.o. male who returns for follow up of   chronic renal disease caused by unknown etiology. te patient  is followed by renal  The patient has experienced the following symptoms: no problems   The patient has not experienced any of the following symptoms: no problems   Prescribed diet is JORDY   The following actions have been prescribed for slowing the   progression of CRF: cardiovascular risk factor reduction including male gender, hypertension   The patientis  on dialysis      Asthma Review:  The patient is being seen for follow up of asthma,  currently in no distress,wheezing has been reported   Asthma symptoms have occured frequently. Wheezing when present is described as moderate and not easily relieved with rescue bronchodilator. The patient does report use of a steroid inhaler. Frequency of use of quick-relief meds: frequent   Regimen compliance: The patient reports adherence to this regimen. .                      Review of Systems  Constitutional: negative for fevers, chills, anorexia and weight loss  Eyes:   negative for visual disturbance and irritation  ENT:   negative for tinnitus,sore throat,nasal congestion,ear pains. hoarseness  Respiratory:  negative for cough, hemoptysis, dyspnea,wheezing  CV:   negative for chest pain, palpitations, lower extremity edema  GI:   negative for nausea, vomiting, diarrhea, abdominal pain,melena  Endo: negative for polyuria,polydipsia,polyphagia,heat intolerance  Genitourinary: negative for frequency, dysuria and hematuria  Integument:  negative for rash and pruritus  Hematologic:  negative for easy bruising and gum/nose bleeding  Musculoskel: negative for myalgias, arthralgias, back pain, muscle weakness, joint pain  Neurological:  negative for headaches, dizziness, vertigo, memory problems and gait   Behavl/Psych: negative for feelings of anxiety, depression, mood changes    Past Medical History:   Diagnosis Date    Adverse effect of anesthesia     \"MY BLOOD PRESSURE DROPS ,\"    Alzheimer's disease 2/18/2011    PT DENIES    Chronic kidney disease     KIDNEYS FAILED R/T HTN    Chronic pain     LEFT HIP PAIN    Heart failure ([facility] 6/2015    CHF    Hemodialysis patient ([facility]     M-W-F    Osteoarthrosis, unspecified whether generalized or localized, unspecified site 2/18/2011    Other unknown and unspecified cause of morbidity or mortality     PT DOSES OFF FREQUENTLY    PUD (peptic ulcer disease)     Renal Disease 2/18/2011    Right inguinal hernia 3/1/2016    Unspecified asthma 2/18/2011    Unspecified essential hypertension 2/18/2011    Ventral incisional hernia 3/1/2016     Past Surgical History:   Procedure Laterality Date    CARDIAC SURG PROCEDURE UNLIST  4/29/15    CARDIAC CATHGERIZATION    HX GI      COLONOSCOPY    HX HEENT      TONSILLECTOMY    HX HERNIA REPAIR  3/8/16    Repair of right inguinal hernia with mesh,Repair of ventral incisional hernia with mesh    HX ORTHOPAEDIC Right 10/13/15     THR    HX ORTHOPAEDIC      left hip    HX OTHER SURGICAL  10/11/2016    revision of AV fistula left arm with repair of pseudoaneurysm    HX SMALL BOWEL RESECTION      HX UROLOGICAL Bilateral 2005    REMOVAL OF KIDNEYS    HX VASCULAR ACCESS      LT ARM WITH PORTS FOR DYALISIS     Social History     Social History    Marital status:      Spouse name: N/A    Number of children: N/A    Years of education: N/A     Social History Main Topics    Smoking status: Never Smoker    Smokeless tobacco: Never Used    Alcohol use No    Drug use: No    Sexual activity: Not Asked     Other Topics Concern    None     Social History Narrative     Family History   Problem Relation Age of Onset    Cancer Mother      BREAST    Cancer Sister      THROAT    Stroke Brother     Anesth Problems Neg Hx      Current Outpatient Prescriptions   Medication Sig Dispense Refill    cyclobenzaprine (FLEXERIL) 10 mg tablet TAKE ONE TABLET BY MOUTH THREE TIMES DAILY AS NEEDED FOR MUSCLE SPASM 90 Tab 0    losartan (COZAAR) 25 mg tablet Take  by mouth daily.  calcium acetate (PHOSLO) 667 mg cap Take 3 Caps by mouth three (3) times daily (with meals).  aspirin delayed-release 81 mg tablet Take 81 mg by mouth daily.  OTHER,NON-FORMULARY, RENAL VITAMIN WITH VIT D PLUS      albuterol (PROVENTIL HFA, VENTOLIN HFA, PROAIR HFA) 90 mcg/actuation inhaler Take 2 Puffs by inhalation every four (4) hours as needed for Wheezing. 1 Inhaler 12    metoprolol (LOPRESSOR) 25 mg tablet Take 25 mg by mouth daily.  acetaminophen (TYLENOL) 325 mg tablet Take 2 Tabs by mouth every six (6) hours.  Indications: PAIN 60 Tab 0     Allergies   Allergen Reactions    Other Food Anaphylaxis     Shell fish \"LUNGS, & THROAT CLOSES UP\"  MILK & CHEESE    Milk Anaphylaxis    Shellfish Derived Anaphylaxis    Iodinated Contrast- Oral And Iv Dye Other (comments)    Peanut Shortness of Breath    Penicillins Unknown (comments)    Prednisone Swelling       Objective:  Visit Vitals    /80 (BP 1 Location: Right arm, BP Patient Position: Sitting)    Pulse 83    Temp 98.5 °F (36.9 °C)    Resp 20    Ht 5' 8\" (1.727 m)    Wt 182 lb (82.6 kg)    SpO2 94%    BMI 27.67 kg/m2     Physical Exam:   General appearance - alert, well appearing, and in no distress  Mental status - alert, oriented to person, place, and time  EYE-ILIANA, EOMI, corneas normal, no foreign bodies  ENT-ENT exam normal, no neck nodes or sinus tenderness  Nose - normal and patent, no erythema, discharge or polyps  Mouth - mucous membranes moist, pharynx normal without lesions  Neck - supple, no significant adenopathy   Chest - scattered wheezes, symmetric air entry   Heart - normal rate, regular rhythm, normal S1, S2, no murmurs, rubs, clicks or gallops   Abdomen - soft, nontender, nondistended, no masses or organomegaly  Lymph- no adenopathy palpable  Ext-peripheral pulses normal, no pedal edema, no clubbing or cyanosis  Skin-Warm and dry. no hyperpigmentation, vitiligo, or suspicious lesions  Neuro -alert, oriented, normal speech, no focal findings or movement disorder noted  Neck-normal C-spine, no tenderness, full ROM without pain  Feet-no nail deformities or callus formation with good pulses noted      Results for orders placed or performed during the hospital encounter of 08/07/17   BNP   Result Value Ref Range     (H) 0 - 100 pg/mL       Assessment/Plan:    ICD-10-CM ICD-9-CM    1. CKD (chronic kidney disease) stage V requiring chronic dialysis (HCC) N18.6 585.6     Z99.2 V45.11    2. Essential hypertension I10 401.9    3. Primary osteoarthritis of right hip M16.11 715.15    4. ESRD (end stage renal disease) on dialysis (HCC) N18.6 585.6     Z99.2 V45.11      No orders of the defined types were placed in this encounter. follow low fat diet, follow low salt diet,Take 81mg aspirin daily  Patient Instructions   Flexion Activation    Thank you for requesting access to Flexion. Please follow the instructions below to securely access and download your online medical record. Flexion allows you to send messages to your doctor, view your test results, renew your prescriptions, schedule appointments, and more. How Do I Sign Up? 1. In your internet browser, go to [email or web address].[email or web address]  2. Click on the First Time User? Click Here link in the Sign In box.  You will be redirect to the New Member Sign Up page. 3. Enter your Phoodeez Access Code exactly as it appears below. You will not need to use this code after youve completed the sign-up process. If you do not sign up before the expiration date, you must request a new code. Phoodeez Access Code: [email or web address]  Expires: 2017  3:30 PM (This is the date your S[employer]Airt access code will )    4. Enter the last four digits of your Social Security Number (xxxx) and Date of Birth (mm/dd/yyyy) as indicated and click Submit. You will be taken to the next sign-up page. 5. Create a ScentAirt ID. This will be your Phoodeez login ID and cannot be changed, so think of one that is secure and easy to remember. 6. Create a Phoodeez password. You can change your password at any time. 7. Enter your Password Reset Question and Answer. This can be used at a later time if you forget your password. 8. Enter your e-mail address. You will receive e-mail notification when new information is available in [address] 9. Click Sign Up. You can now view and download portions of your medical record. 10. Click the Download Summary menu link to download a portable copy of your medical information. Additional Information    If you have questions, please visit the Frequently Asked Questions section of the Phoodeez website at [email or web address] Remember, Phoodeez is NOT to be used for urgent needs. For medical emergencies, dial 911. Follow-up Disposition:  Return in about 4 weeks (around 2017), or if symptoms worsen or fail to improve. I have reviewed with the patient details of the assessment and plan and all questions were answered. Relevent patient education was performed. The most recent lab findings were reviewed with the patient. An After Visit Summary was printed and given to the patient.

## 2017-09-28 ENCOUNTER — OFFICE VISIT (OUTPATIENT)
Dept: INTERNAL MEDICINE CLINIC | Age: 62
End: 2017-09-28

## 2017-09-28 VITALS
HEIGHT: 68 IN | TEMPERATURE: 98.3 F | RESPIRATION RATE: 16 BRPM | HEART RATE: 100 BPM | BODY MASS INDEX: 27.13 KG/M2 | OXYGEN SATURATION: 95 % | SYSTOLIC BLOOD PRESSURE: 104 MMHG | WEIGHT: 179 LBS | DIASTOLIC BLOOD PRESSURE: 68 MMHG

## 2017-09-28 DIAGNOSIS — Z99.2 CKD (CHRONIC KIDNEY DISEASE) STAGE V REQUIRING CHRONIC DIALYSIS (HCC): ICD-10-CM

## 2017-09-28 DIAGNOSIS — E78.00 HYPERCHOLESTEREMIA: ICD-10-CM

## 2017-09-28 DIAGNOSIS — J45.20 MILD INTERMITTENT ASTHMA WITHOUT COMPLICATION: ICD-10-CM

## 2017-09-28 DIAGNOSIS — I10 ESSENTIAL HYPERTENSION: Primary | ICD-10-CM

## 2017-09-28 DIAGNOSIS — N18.6 CKD (CHRONIC KIDNEY DISEASE) STAGE V REQUIRING CHRONIC DIALYSIS (HCC): ICD-10-CM

## 2017-09-28 LAB
CHOLEST SERPL-MCNC: 270 MG/DL
HDLC SERPL-MCNC: 57 MG/DL
LDL CHOLESTEROL POC: 164 MG/DL
NON-HDL GOAL (POC): 213
TCHOL/HDL RATIO (POC): 4.8
TRIGL SERPL-MCNC: 246 MG/DL

## 2017-09-28 RX ORDER — SILDENAFIL 100 MG/1
100 TABLET, FILM COATED ORAL AS NEEDED
Qty: 8 TAB | Refills: 12 | Status: ON HOLD | OUTPATIENT
Start: 2017-09-28 | End: 2020-05-15

## 2017-09-28 RX ORDER — CARVEDILOL 6.25 MG/1
12.5 TABLET ORAL EVERY OTHER DAY
Qty: 60 TAB | Refills: 12 | Status: ON HOLD | COMMUNITY
Start: 2017-09-28 | End: 2020-05-15

## 2017-09-28 RX ORDER — ATORVASTATIN CALCIUM 40 MG/1
40 TABLET, FILM COATED ORAL DAILY
Qty: 30 TAB | Refills: 12 | Status: ON HOLD | OUTPATIENT
Start: 2017-09-28 | End: 2018-04-06

## 2017-09-28 NOTE — PROGRESS NOTES
Erectile dysfunction Review[de-identified]  Patient complains of difficulty in maintaining an adequate erection. He states that his present medication is helping thus far.

## 2017-09-28 NOTE — MR AVS SNAPSHOT
Visit Information Date & Time Provider Department Dept. Phone Encounter #  
 9/28/2017  2:30 PM Elise Padilla MD [address] [place] [address] [phone] [id number] Follow-up Instructions Return in about 4 weeks (around 10/26/2017), or if symptoms worsen or fail to improve. Follow-up and Disposition History Upcoming Health Maintenance Date Due Pneumococcal 19-64 Highest Risk (1 of 3 - PCV13) 7/28/1972 FOBT Q 1 YEAR AGE 50-75 9/18/2015 INFLUENZA AGE 9 TO ADULT 8/1/2017 DTaP/Tdap/Td series (1 - Tdap) 5/24/2018* MEDICARE YEARLY EXAM 5/26/2018 *Topic was postponed. The date shown is not the original due date. Allergies as of 9/28/2017  Review Complete On: 9/28/2017 By: April Kaye Fernández LPN Severity Noted Reaction Type Reactions Other Food High 09/24/2015    Anaphylaxis Shell fish \"LUNGS, & THROAT CLOSES UP\" MILK & CHEESE Milk High 10/13/2015    Anaphylaxis Shellfish Derived High 10/13/2015    Anaphylaxis Iodinated Contrast- Oral And Iv Dye  09/12/2012    Other (comments) Peanut  09/12/2012    Shortness of Breath Penicillins  02/18/2011    Unknown (comments) Current Immunizations  Reviewed on 10/16/2015 No immunizations on file. Not reviewed this visit You Were Diagnosed With   
  
 Codes Comments Essential hypertension    -  Primary ICD-10-CM: I10 
ICD-9-CM: 401.9 CKD (chronic kidney disease) stage V requiring chronic dialysis (HCC)     ICD-10-CM: N18.6, Z99.2 ICD-9-CM: 585.6, V45.11 Mild intermittent asthma without complication     XLK-94-UX: J45.20 ICD-9-CM: 493.90 Hypercholesteremia     ICD-10-CM: E78.00 ICD-9-CM: 272.0 Vitals BP Pulse Temp Resp Height(growth percentile) Weight(growth percentile) 104/68 100 98.3 °F (36.8 °C) (Oral) 16 5' 8\" (1.727 m) 179 lb (81.2 kg) SpO2 BMI Smoking Status 95% 27.22 kg/m2 Never Smoker BMI and BSA Data Body Mass Index Body Surface Area  
 27.22 kg/m 2 1.97 m 2 Preferred Pharmacy Pharmacy Name Phone [facility] [address] [facility] [phone] Your Updated Medication List  
  
   
This list is accurate as of: 9/28/17  3:28 PM.  Always use your most recent med list.  
  
  
  
  
 acetaminophen 325 mg tablet Commonly known as:  TYLENOL Take 2 Tabs by mouth every six (6) hours. Indications: PAIN  
  
 albuterol 90 mcg/actuation inhaler Commonly known as:  PROVENTIL HFA, VENTOLIN HFA, PROAIR HFA Take 2 Puffs by inhalation every four (4) hours as needed for Wheezing. aspirin delayed-release 81 mg tablet Take 81 mg by mouth daily. atorvastatin 40 mg tablet Commonly known as:  LIPITOR Take 1 Tab by mouth daily. calcium acetate 667 mg Cap Commonly known as:  PHOSLO Take 3 Caps by mouth three (3) times daily (with meals). carvedilol 6.25 mg tablet Commonly known as:  Janalyn Humberto Take 1 Tab by mouth every other day. cyclobenzaprine 10 mg tablet Commonly known as:  FLEXERIL  
TAKE ONE TABLET BY MOUTH THREE TIMES DAILY AS NEEDED FOR MUSCLE SPASM  
  
 fluticasone-salmeterol 232-14 mcg/actuation Aepb Commonly known as:  Isabel Jaksch Take 1 Inhaler by inhalation two (2) times a day. losartan 25 mg tablet Commonly known as:  COZAAR Take  by mouth daily. OTHER(NON-FORMULARY) RENAL VITAMIN WITH VIT D PLUS  
  
 predniSONE 10 mg tablet Commonly known as:  DELTASONE  
6 tabs today and reduce by 1 tab daily  
  
 sildenafil citrate 100 mg tablet Commonly known as:  VIAGRA Take 1 Tab by mouth as needed. Prescriptions Printed Refills  
 sildenafil citrate (VIAGRA) 100 mg tablet 12 Sig: Take 1 Tab by mouth as needed. Class: Print Route: Oral  
  
Prescriptions Sent to Pharmacy Refills  
 atorvastatin (LIPITOR) 40 mg tablet 12 Sig: Take 1 Tab by mouth daily. Class: Normal  
 Pharmacy: [facility] [address] [facility] #: [phone] Route: Oral  
  
We Performed the Following AMB POC LIPID PROFILE [09315 CPT(R)] Follow-up Instructions Return in about 4 weeks (around 10/26/2017), or if symptoms worsen or fail to improve. Patient Instructions Toutposthart Activation Thank you for requesting access to Modbook. Please follow the instructions below to securely access and download your online medical record. Modbook allows you to send messages to your doctor, view your test results, renew your prescriptions, schedule appointments, and more. How Do I Sign Up? 1. In your internet browser, go to [email or web address] 
2. Click on the First Time User? Click Here link in the Sign In box. You will be redirect to the New Member Sign Up page. 3. Enter your Modbook Access Code exactly as it appears below. You will not need to use this code after youve completed the sign-up process. If you do not sign up before the expiration date, you must request a new code. Modbook Access Code: [email or web address] Expires: 2017  3:30 PM (This is the date your Modbook access code will ) 4. Enter the last four digits of your Social Security Number (xxxx) and Date of Birth (mm/dd/yyyy) as indicated and click Submit. You will be taken to the next sign-up page. 5. Create a Modbook ID. This will be your Modbook login ID and cannot be changed, so think of one that is secure and easy to remember. 6. Create a Modbook password. You can change your password at any time. 7. Enter your Password Reset Question and Answer. This can be used at a later time if you forget your password. 8. Enter your e-mail address. You will receive e-mail notification when new information is available in [address] 9. Click Sign Up. You can now view and download portions of your medical record. 10. Click the Download Summary menu link to download a portable copy of your medical information. Additional Information If you have questions, please visit the Frequently Asked Questions section of the Longaccess website at [email or web address] Remember, Longaccess is NOT to be used for urgent needs. For medical emergencies, dial 911. Introducing [facility]! John Ramos introduces Longaccess patient portal. Now you can access parts of your medical record, email your doctor's office, and request medication refills online. 1. In your internet browser, go to [email or web address] 2. Click on the First Time User? Click Here link in the Sign In box. You will see the New Member Sign Up page. 3. Enter your Longaccess Access Code exactly as it appears below. You will not need to use this code after youve completed the sign-up process. If you do not sign up before the expiration date, you must request a new code. · Longaccess Access Code: [email or web address] Expires: 11/29/2017  3:30 PM 
 
4. Enter the last four digits of your Social Security Number (xxxx) and Date of Birth (mm/dd/yyyy) as indicated and click Submit. You will be taken to the next sign-up page. 5. Create a Longaccess ID. This will be your Longaccess login ID and cannot be changed, so think of one that is secure and easy to remember. 6. Create a Longaccess password. You can change your password at any time. 7. Enter your Password Reset Question and Answer. This can be used at a later time if you forget your password. 8. Enter your e-mail address. You will receive e-mail notification when new information is available in [address] 9. Click Sign Up. You can now view and download portions of your medical record. 10. Click the Download Summary menu link to download a portable copy of your medical information.  
 
If you have questions, please visit the Frequently Asked Questions section of the Glider. Remember, ECOt is NOT to be used for urgent needs. For medical emergencies, dial 911. Now available from your iPhone and Android! Please provide this summary of care documentation to your next provider. Your primary care clinician is listed as Racheal Ruelas. If you have any questions after today's visit, please call [phone].

## 2017-09-28 NOTE — PROGRESS NOTES
All Bolivar is a 57 y.o. male and presents with Hypertension  . Subjective:  Hypertension Review:  The patient has essential hypertension  Diet and Lifestyle: generally follows a  low sodium diet, exercises sporadically  Home BP Monitoring: is not measured at home. Pertinent ROS: taking medications as instructed, no medication side effects noted, no TIA's, no chest pain on exertion, no dyspnea on exertion, no swelling of ankles. Chronic Renal Disease Review:  HPI: All Bolivar, ([id number]) is a 57 y.o. male who returns for follow up of   chronic renal disease caused by unknown etiology. te patient  is followed by renal  The patient has experienced the following symptoms: no problems   The patient has not experienced any of the following symptoms: no problems   Prescribed diet is JORDY   The following actions have been prescribed for slowing the   progression of CRF: cardiovascular risk factor reduction including male gender, hypertension   The patientis  on dialysis          Asthma Review:  The patient is being seen for follow up of asthma,  currently stable. Asthma symptoms occur: infrequently. Wheezing when present is described as mild and easily relieved with rescue bronchodilator. The patient reports use of a steroid inhaler. Frequency of use of quick-relief meds: rarely. Regimen compliance: The patient reports adherence to this regimen. Review of Systems  Constitutional: negative for fevers, chills, anorexia and weight loss  Eyes:   negative for visual disturbance and irritation  ENT:   negative for tinnitus,sore throat,nasal congestion,ear pains. hoarseness  Respiratory:  negative for cough, hemoptysis, dyspnea,wheezing  CV:   negative for chest pain, palpitations, lower extremity edema  GI:   negative for nausea, vomiting, diarrhea, abdominal pain,melena  Endo:               negative for polyuria,polydipsia,polyphagia,heat intolerance  Genitourinary: negative for frequency, dysuria and hematuria  Integument:  negative for rash and pruritus  Hematologic:  negative for easy bruising and gum/nose bleeding  Musculoskel: negative for myalgias, arthralgias, back pain, muscle weakness, joint pain  Neurological:  negative for headaches, dizziness, vertigo, memory problems and gait   Behavl/Psych: negative for feelings of anxiety, depression, mood changes    Past Medical History:   Diagnosis Date    Adverse effect of anesthesia     \"MY BLOOD PRESSURE DROPS ,\"    Alzheimer's disease 2/18/2011    PT DENIES    Chronic kidney disease     KIDNEYS FAILED R/T HTN    Chronic pain     LEFT HIP PAIN    Heart failure ([facility] [address] 6/2015    CHF    Hemodialysis patient ([facility] [address]     M-W-F    Osteoarthrosis, unspecified whether generalized or localized, unspecified site 2/18/2011    Other unknown and unspecified cause of morbidity or mortality     PT DOSES OFF FREQUENTLY    PUD (peptic ulcer disease)     Renal Disease 2/18/2011    Right inguinal hernia 3/1/2016    Unspecified asthma 2/18/2011    Unspecified essential hypertension 2/18/2011    Ventral incisional hernia 3/1/2016     Past Surgical History:   Procedure Laterality Date    CARDIAC SURG PROCEDURE UNLIST  4/29/15    CARDIAC CATHGERIZATION    HX GI      COLONOSCOPY    HX HEENT      TONSILLECTOMY    HX HERNIA REPAIR  3/8/16    Repair of right inguinal hernia with mesh,Repair of ventral incisional hernia with mesh    HX ORTHOPAEDIC Right 10/13/15     THR    HX ORTHOPAEDIC      left hip    HX OTHER SURGICAL  10/11/2016    revision of AV fistula left arm with repair of pseudoaneurysm    HX SMALL BOWEL RESECTION      HX UROLOGICAL Bilateral 2005    REMOVAL OF KIDNEYS    HX VASCULAR ACCESS      LT ARM WITH PORTS FOR DYALISIS     Social History     Social History    Marital status:      Spouse name: N/A    Number of children: N/A    Years of education: N/A     Social History Main Topics    Smoking status: Never Smoker    Smokeless tobacco: Never Used    Alcohol use No    Drug use: No    Sexual activity: Not Asked     Other Topics Concern    None     Social History Narrative     Family History   Problem Relation Age of Onset    Cancer Mother      BREAST    Cancer Sister      THROAT    Stroke Brother     Anesth Problems Neg Hx      Current Outpatient Prescriptions   Medication Sig Dispense Refill    carvedilol (COREG) 6.25 mg tablet Take 1 Tab by mouth every other day. 60 Tab 12    atorvastatin (LIPITOR) 40 mg tablet Take 1 Tab by mouth daily. 30 Tab 12    fluticasone-salmeterol (AIRDUO RESPICLICK) 548-00 mcg/actuation aepb Take 1 Inhaler by inhalation two (2) times a day. 1 Inhaler 12    predniSONE (DELTASONE) 10 mg tablet 6 tabs today and reduce by 1 tab daily 21 Tab 3    cyclobenzaprine (FLEXERIL) 10 mg tablet TAKE ONE TABLET BY MOUTH THREE TIMES DAILY AS NEEDED FOR MUSCLE SPASM 90 Tab 0    losartan (COZAAR) 25 mg tablet Take  by mouth daily.  calcium acetate (PHOSLO) 667 mg cap Take 3 Caps by mouth three (3) times daily (with meals).  aspirin delayed-release 81 mg tablet Take 81 mg by mouth daily.  acetaminophen (TYLENOL) 325 mg tablet Take 2 Tabs by mouth every six (6) hours. Indications: PAIN 60 Tab 0    OTHER,NON-FORMULARY, RENAL VITAMIN WITH VIT D PLUS      albuterol (PROVENTIL HFA, VENTOLIN HFA, PROAIR HFA) 90 mcg/actuation inhaler Take 2 Puffs by inhalation every four (4) hours as needed for Wheezing.  1 Inhaler 12     Allergies   Allergen Reactions    Other Food Anaphylaxis     Shell fish \"LUNGS, & THROAT CLOSES UP\"  MILK & CHEESE    Milk Anaphylaxis    Shellfish Derived Anaphylaxis    Iodinated Contrast- Oral And Iv Dye Other (comments)    Peanut Shortness of Breath    Penicillins Unknown (comments)       Objective:  Visit Vitals    /68    Pulse 100    Temp 98.3 °F (36.8 °C) (Oral)    Resp 16    Ht 5' 8\" (1.727 m)    Wt 179 lb (81.2 kg)    SpO2 95%    BMI 27.22 kg/m2     Physical Exam: General appearance - alert, well appearing, and in no distress  Mental status - alert, oriented to person, place, and time  EYE-ILIANA, EOMI, corneas normal, no foreign bodies  ENT-ENT exam normal, no neck nodes or sinus tenderness  Nose - normal and patent, no erythema, discharge or polyps  Mouth - mucous membranes moist, pharynx normal without lesions  Neck - supple, no significant adenopathy   Chest - clear to auscultation, no wheezes, rales or rhonchi, symmetric air entry   Heart - normal rate, regular rhythm, normal S1, S2, no murmurs, rubs, clicks or gallops   Abdomen - soft, nontender, nondistended, no masses or organomegaly  Lymph- no adenopathy palpable  Ext-peripheral pulses normal, no pedal edema, no clubbing or cyanosis  Skin-Warm and dry. no hyperpigmentation, vitiligo, or suspicious lesions  Neuro -alert, oriented, normal speech, no focal findings or movement disorder noted  Neck-normal C-spine, no tenderness, full ROM without pain  Feet-no nail deformities or callus formation with good pulses noted      Results for orders placed or performed in visit on 09/28/17   AMB POC LIPID PROFILE   Result Value Ref Range    Cholesterol (POC) 270     Triglycerides (POC) 246     HDL Cholesterol (POC) 57     Non-HDL Goal (POC) 213     LDL Cholesterol (POC) 164 MG/DL    TChol/HDL Ratio (POC) 4.8        Assessment/Plan:    ICD-10-CM ICD-9-CM    1. Essential hypertension I10 401.9 AMB POC LIPID PROFILE      carvedilol (COREG) 6.25 mg tablet   2. CKD (chronic kidney disease) stage V requiring chronic dialysis (HCC) N18.6 585.6     Z99.2 V45.11    3. Mild intermittent asthma without complication K25.12 093.43    4. Hypercholesteremia E78.00 272.0 atorvastatin (LIPITOR) 40 mg tablet     Orders Placed This Encounter    AMB POC LIPID PROFILE    carvedilol (COREG) 6.25 mg tablet     Sig: Take 1 Tab by mouth every other day.      Dispense:  60 Tab     Refill:  12    atorvastatin (LIPITOR) 40 mg tablet     Sig: Take 1 Tab by mouth daily. Dispense:  30 Tab     Refill:  12     lose weight, follow low fat diet, follow low salt diet, continue present plan,Take 81mg aspirin daily  Patient Instructions   MyChart Activation    Thank you for requesting access to Smartpay. Please follow the instructions below to securely access and download your online medical record. Smartpay allows you to send messages to your doctor, view your test results, renew your prescriptions, schedule appointments, and more. How Do I Sign Up? 1. In your internet browser, go to [email or web address]  2. Click on the First Time User? Click Here link in the Sign In box. You will be redirect to the New Member Sign Up page. 3. Enter your Smartpay Access Code exactly as it appears below. You will not need to use this code after youve completed the sign-up process. If you do not sign up before the expiration date, you must request a new code. Smartpay Access Code: [email or web address]  Expires: 2017  3:30 PM (This is the date your Smartpay access code will )    4. Enter the last four digits of your Social Security Number (xxxx) and Date of Birth (mm/dd/yyyy) as indicated and click Submit. You will be taken to the next sign-up page. 5. Create a Smartpay ID. This will be your Smartpay login ID and cannot be changed, so think of one that is secure and easy to remember. 6. Create a Smartpay password. You can change your password at any time. 7. Enter your Password Reset Question and Answer. This can be used at a later time if you forget your password. 8. Enter your e-mail address. You will receive e-mail notification when new information is available in [address] 9. Click Sign Up. You can now view and download portions of your medical record. 10. Click the Download Summary menu link to download a portable copy of your medical information.     Additional Information    If you have questions, please visit the Frequently Asked Questions section of the GoHomet website at [email or web address]. "[employer] formerly [employer]academy". [email or web address] Remember, myhomemove is NOT to be used for urgent needs. For medical emergencies, dial 911. Follow-up Disposition:  Return in about 4 weeks (around 10/26/2017), or if symptoms worsen or fail to improve. I have reviewed with the patient details of the assessment and plan and all questions were answered. Relevent patient education was performed. The most recent lab findings were reviewed with the patient. An After Visit Summary was printed and given to the patient.

## 2017-09-28 NOTE — PATIENT INSTRUCTIONS
[employer] Activation    Thank you for requesting access to [employer]. Please follow the instructions below to securely access and download your online medical record. [employer] allows you to send messages to your doctor, view your test results, renew your prescriptions, schedule appointments, and more. How Do I Sign Up? 1. In your internet browser, go to [email or web address]  2. Click on the First Time User? Click Here link in the Sign In box. You will be redirect to the New Member Sign Up page. 3. Enter your [employer] Access Code exactly as it appears below. You will not need to use this code after youve completed the sign-up process. If you do not sign up before the expiration date, you must request a new code. [employer] Access Code: [email or web address]  Expires: 2017  3:30 PM (This is the date your [employer] access code will )    4. Enter the last four digits of your Social Security Number (xxxx) and Date of Birth (mm/dd/yyyy) as indicated and click Submit. You will be taken to the next sign-up page. 5. Create a [employer] ID. This will be your [employer] login ID and cannot be changed, so think of one that is secure and easy to remember. 6. Create a [employer] password. You can change your password at any time. 7. Enter your Password Reset Question and Answer. This can be used at a later time if you forget your password. 8. Enter your e-mail address. You will receive e-mail notification when new information is available in [address] 9. Click Sign Up. You can now view and download portions of your medical record. 10. Click the Download Summary menu link to download a portable copy of your medical information. Additional Information    If you have questions, please visit the Frequently Asked Questions section of the [employer] website at [email or web address]/. Remember, [employer] is NOT to be used for urgent needs. For medical emergencies, dial 911.

## 2018-03-28 RX ORDER — CYCLOBENZAPRINE HCL 10 MG
TABLET ORAL
Qty: 90 TAB | Refills: 0 | Status: SHIPPED | OUTPATIENT
Start: 2018-03-28 | End: 2018-07-24 | Stop reason: SDUPTHER

## 2018-04-06 ENCOUNTER — HOSPITAL ENCOUNTER (OUTPATIENT)
Dept: CARDIAC CATH/INVASIVE PROCEDURES | Age: 63
Discharge: HOME | End: 2018-04-06
Attending: INTERNAL MEDICINE | Admitting: INTERNAL MEDICINE
Payer: MEDICARE

## 2018-04-06 VITALS
RESPIRATION RATE: 16 BRPM | BODY MASS INDEX: 26.68 KG/M2 | SYSTOLIC BLOOD PRESSURE: 151 MMHG | HEIGHT: 67 IN | TEMPERATURE: 98.7 F | DIASTOLIC BLOOD PRESSURE: 61 MMHG | HEART RATE: 93 BPM | OXYGEN SATURATION: 96 % | WEIGHT: 170 LBS

## 2018-04-06 LAB
ANION GAP SERPL CALC-SCNC: 10 MMOL/L (ref 5–15)
BUN SERPL-MCNC: 30 MG/DL (ref 6–20)
BUN/CREAT SERPL: 4 (ref 12–20)
CALCIUM SERPL-MCNC: 8.9 MG/DL (ref 8.5–10.1)
CHLORIDE SERPL-SCNC: 100 MMOL/L (ref 97–108)
CO2 SERPL-SCNC: 29 MMOL/L (ref 21–32)
CREAT SERPL-MCNC: 6.81 MG/DL (ref 0.7–1.3)
ERYTHROCYTE [DISTWIDTH] IN BLOOD BY AUTOMATED COUNT: 13.8 % (ref 11.5–14.5)
GLUCOSE SERPL-MCNC: 97 MG/DL (ref 65–100)
HCT VFR BLD AUTO: 35.5 % (ref 36.6–50.3)
HGB BLD-MCNC: 11.2 G/DL (ref 12.1–17)
MCH RBC QN AUTO: 32.7 PG (ref 26–34)
MCHC RBC AUTO-ENTMCNC: 31.5 G/DL (ref 30–36.5)
MCV RBC AUTO: 103.5 FL (ref 80–99)
NRBC # BLD: 0 K/UL (ref 0–0.01)
NRBC BLD-RTO: 0 PER 100 WBC
PLATELET # BLD AUTO: 260 K/UL (ref 150–400)
PMV BLD AUTO: 11 FL (ref 8.9–12.9)
POTASSIUM SERPL-SCNC: 3.9 MMOL/L (ref 3.5–5.1)
RBC # BLD AUTO: 3.43 M/UL (ref 4.1–5.7)
SODIUM SERPL-SCNC: 139 MMOL/L (ref 136–145)
WBC # BLD AUTO: 8.3 K/UL (ref 4.1–11.1)

## 2018-04-06 PROCEDURE — 77030013744

## 2018-04-06 PROCEDURE — 74011250636 HC RX REV CODE- 250/636: Performed by: INTERNAL MEDICINE

## 2018-04-06 PROCEDURE — 93005 ELECTROCARDIOGRAM TRACING: CPT

## 2018-04-06 PROCEDURE — 85027 COMPLETE CBC AUTOMATED: CPT | Performed by: INTERNAL MEDICINE

## 2018-04-06 PROCEDURE — 74011636320 HC RX REV CODE- 636/320: Performed by: INTERNAL MEDICINE

## 2018-04-06 PROCEDURE — 77030004533 HC CATH ANGI DX IMP BSC -B

## 2018-04-06 PROCEDURE — 74011000250 HC RX REV CODE- 250: Performed by: INTERNAL MEDICINE

## 2018-04-06 PROCEDURE — C1760 CLOSURE DEV, VASC: HCPCS

## 2018-04-06 PROCEDURE — C1769 GUIDE WIRE: HCPCS

## 2018-04-06 PROCEDURE — 93458 L HRT ARTERY/VENTRICLE ANGIO: CPT

## 2018-04-06 PROCEDURE — 80048 BASIC METABOLIC PNL TOTAL CA: CPT | Performed by: INTERNAL MEDICINE

## 2018-04-06 PROCEDURE — 36415 COLL VENOUS BLD VENIPUNCTURE: CPT | Performed by: INTERNAL MEDICINE

## 2018-04-06 RX ORDER — FENTANYL CITRATE 50 UG/ML
25-200 INJECTION, SOLUTION INTRAMUSCULAR; INTRAVENOUS AS NEEDED
Status: DISCONTINUED | OUTPATIENT
Start: 2018-04-06 | End: 2018-04-06

## 2018-04-06 RX ORDER — LIDOCAINE HYDROCHLORIDE 10 MG/ML
10-30 INJECTION INFILTRATION; PERINEURAL
Status: DISCONTINUED | OUTPATIENT
Start: 2018-04-06 | End: 2018-04-06

## 2018-04-06 RX ORDER — ACETAMINOPHEN 325 MG/1
650 TABLET ORAL
Status: DISCONTINUED | OUTPATIENT
Start: 2018-04-06 | End: 2018-04-06 | Stop reason: HOSPADM

## 2018-04-06 RX ORDER — HEPARIN SODIUM 200 [USP'U]/100ML
2000 INJECTION, SOLUTION INTRAVENOUS AS NEEDED
Status: DISCONTINUED | OUTPATIENT
Start: 2018-04-06 | End: 2018-04-06

## 2018-04-06 RX ORDER — DIPHENHYDRAMINE HYDROCHLORIDE 50 MG/ML
25 INJECTION, SOLUTION INTRAMUSCULAR; INTRAVENOUS ONCE
Status: COMPLETED | OUTPATIENT
Start: 2018-04-06 | End: 2018-04-06

## 2018-04-06 RX ORDER — NITROGLYCERIN 0.4 MG/1
0.4 TABLET SUBLINGUAL AS NEEDED
Status: DISCONTINUED | OUTPATIENT
Start: 2018-04-06 | End: 2018-04-06

## 2018-04-06 RX ORDER — SODIUM CHLORIDE 0.9 % (FLUSH) 0.9 %
5-10 SYRINGE (ML) INJECTION AS NEEDED
Status: DISCONTINUED | OUTPATIENT
Start: 2018-04-06 | End: 2018-04-06

## 2018-04-06 RX ORDER — MIDAZOLAM HYDROCHLORIDE 1 MG/ML
.5-1 INJECTION, SOLUTION INTRAMUSCULAR; INTRAVENOUS AS NEEDED
Status: DISCONTINUED | OUTPATIENT
Start: 2018-04-06 | End: 2018-04-06

## 2018-04-06 RX ORDER — HYDROCORTISONE SODIUM SUCCINATE 100 MG/2ML
100 INJECTION, POWDER, FOR SOLUTION INTRAMUSCULAR; INTRAVENOUS ONCE
Status: COMPLETED | OUTPATIENT
Start: 2018-04-06 | End: 2018-04-06

## 2018-04-06 RX ORDER — ATROPINE SULFATE 0.1 MG/ML
.5-1 INJECTION INTRAVENOUS AS NEEDED
Status: DISCONTINUED | OUTPATIENT
Start: 2018-04-06 | End: 2018-04-06

## 2018-04-06 RX ORDER — HEPARIN SODIUM 1000 [USP'U]/ML
1000-10000 INJECTION, SOLUTION INTRAVENOUS; SUBCUTANEOUS AS NEEDED
Status: DISCONTINUED | OUTPATIENT
Start: 2018-04-06 | End: 2018-04-06

## 2018-04-06 RX ADMIN — IOPAMIDOL 95 ML: 755 INJECTION, SOLUTION INTRAVENOUS at 16:13

## 2018-04-06 RX ADMIN — HEPARIN SODIUM 2000 UNITS: 200 INJECTION, SOLUTION INTRAVENOUS at 15:56

## 2018-04-06 RX ADMIN — MIDAZOLAM HYDROCHLORIDE 1 MG: 1 INJECTION, SOLUTION INTRAMUSCULAR; INTRAVENOUS at 15:51

## 2018-04-06 RX ADMIN — FENTANYL CITRATE 50 MCG: 50 INJECTION, SOLUTION INTRAMUSCULAR; INTRAVENOUS at 15:51

## 2018-04-06 RX ADMIN — HYDROCORTISONE SODIUM SUCCINATE 100 MG: 100 INJECTION, POWDER, FOR SOLUTION INTRAMUSCULAR; INTRAVENOUS at 15:33

## 2018-04-06 RX ADMIN — LIDOCAINE HYDROCHLORIDE 10 ML: 10 INJECTION, SOLUTION INFILTRATION; PERINEURAL at 15:56

## 2018-04-06 RX ADMIN — DIPHENHYDRAMINE HYDROCHLORIDE 25 MG: 50 INJECTION, SOLUTION INTRAMUSCULAR; INTRAVENOUS at 15:33

## 2018-04-06 NOTE — DISCHARGE INSTRUCTIONS
Patient Discharge Instructions    [facility] / [id number] : 1955    Admitted 2018 Discharged: 2018     Take Home Medications            · It is important that you take the medication exactly as they are prescribed. · Keep your medication in the bottles provided by the pharmacist and keep a list of the medication names, dosages, and times to be taken in your wallet. · Do not take other medications without consulting your doctor. BRING ALL OF YOUR MEDICINES TO YOUR OFFICE VISIT with Dr. Asif Best. Follow-up with Sheldon Slade MD in 2 weeks. Call [phone] to confirm your follow up appointment. Cardiac Catheterization  Discharge Instructions     Do not drive, operate any machinery, or sign any legal documents for 24 hours after your procedure. You must have someone to drive you home.  You may take a shower 24 hours after your cardiac catheterization. Be sure to get the dressing wet and then remove it; gently wash the area with warm soapy water. Pat dry and leave open to air. To help prevent infections, be sure to keep the cath site clean and dry. No lotions, creams, powders, ointments, etc. in the cath site for approximately 1 week.  Do not take a tub bath, get in a hot tub or swimming pool for approximately 5 days or until the cath site is completely healed.  No strenuous activity or heavy lifting over 10 lbs. for 7 days.  Drink plenty of fluids for 24-48 hours after your cath to flush the contrast dye from your kidneys. No alcoholic beverages for 24 hours. You may resume your previous diet (low fat, low cholesterol) after your cath.  After your cath, some bruising or discomfort is common during the healing process. Tylenol, 1-2 tablets every 6 hours as needed, is recommended if you experience any discomfort.   If you experience any signs or symptoms of infection such as fever, chills, or poorly healing incision, persistent tenderness or swelling in the groin, redness and/or warmth to the touch, numbness, significant tingling or pain at the groin site or affected extremity, rash, drainage from the cath site, or if the leg feels tight or swollen, call your physician right away.  If bleeding at the cath site occurs, take a clean gauze pad and apply direct pressure to the groin just above the puncture site. Call 911 immediately, and continue to apply direct pressure until an ambulance gets to your location.  You may return to work  2  days after your cardiac cath if no groin bleeding. Information obtained by :  I understand that if any problems occur once I am at home I am to contact my physician. I understand and acknowledge receipt of the instructions indicated above.                                                                                                                                            R.N.'s Signature                                                                  Date/Time                                                                                                                                              Patient or Representative Signature                                                          Date/Time      Kevin Rivera MD

## 2018-04-06 NOTE — PROGRESS NOTES
Cardiac Cath Lab Recovery Arrival Note:      Michael Rivero arrived to Cardiac Cath Lab, Recovery Area. Staff introduced to patient. Patient identifiers verified with NAME and DATE OF BIRTH. Procedure verified with patient. Consent forms reviewed and signed by patient or authorized representative and verified. Allergies verified. Patient informed of procedure and plan of care. Questions answered with review. Patient prepped for procedure, per orders from physician, prior to arrival.    Patient on cardiac monitor, non-invasive blood pressure, SPO2 monitor. Patient is A&Ox 4. Patient reports no complaints. Patient in stretcher, in low position, with side rails up, call bell within reach, patient instructed to call of assistance as needed. Patient prep in: [facility] Recovery Area, Bed# 5. Family in: waiting room. Prep by: SAMIR Kumar

## 2018-04-06 NOTE — H&P
[facility]  ACUTE CARE HISTORY AND PHYSICAL    Name:Kelechi BASHIR  MR#: [id number]  : 1955  ACCOUNT #: [de-identified]   DATE OF SERVICE: 2018    HISTORY OF PRESENT ILLNESS:  The patient is a 58-year-old chronic renal failure patient on hemodialysis who I have followed for some time. He recently complained of exertional chest pain while exercising at the gym. He had a myocardial perfusion imaging study on 2018, which was abnormal with an inferior wall defect with a moderate amount of reperfusion with an ejection fraction of 38%. He previously had coronary angiography in  and had nonocclusive disease at that time. There is no history of myocardial infarction. His other medical problems include hyperlipidemia. MEDICATIONS:  Prior to admission include the following:  Cyclobenzaprine 10 mg 3 times a day, carvedilol 6.25 mg every other day, losartan 25 mg daily, PhosLo 667, 3 capsules 3 times a day with meals, aspirin 81 mg daily, Viagra 100 mg as needed, Tylenol 325, 2 tablets every 6 hours as needed, albuterol inhaler 2 puffs every 4 hours, and he has been taking prednisone for preparation of his cath. ALLERGIES:  SHELLFISH, MILK, IODINATED CONTRAST, PEANUTS, AND PENICILLIN. REVIEW OF SYSTEMS:  Positive for moderate dyspnea on exertion. Negative for bleeding per urine or stool. Negative for hemoptysis. Negative for syncope, palpitations, or edema. A general review of systems was otherwise noncontributory and unremarkable. PHYSICAL EXAMINATION:  GENERAL:  This is a well-developed, pleasant gentleman in no distress. VITAL SIGNS:  As follows:  Blood pressure 130/80, pulse 91, respirations normal, sats 97% on room air. HEENT:  Unremarkable. NECK:  Supple, no adenopathy. CHEST:  Nontender. LUNGS:  Clear to auscultation and percussion. HEART:  Regular, moderate rhythm, soft S4, no S3. No friction rub, no neck vein distention.   ABDOMEN:  Soft, nontender, no bruits, no ascites. No palpable masses. EXTREMITIES:  No edema. Normal pedal pulses. NEUROLOGIC:  The patient is awake, alert, appropriate, normal speech, normal gait. IMPRESSION:  This patient had chest pain with an abnormal nuclear stress test.  Previously nonocclusive coronary disease from a 2014 catheterization. Patient is here for coronary angiography with PCI if required. The procedure was reviewed with the patient in detail including but not limited to the risks of stroke, myocardial infarction, contrast reactions, bleeding, and emergency surgery. The patient agrees to proceed.       MD HUMPHREY Welch / SN  D: 04/06/2018 14:59     T: 04/06/2018 15:36  JOB #: [id number]

## 2018-04-06 NOTE — IP AVS SNAPSHOT
[address] [facility] [address] [place] [address] 
[phone] Patient: Maci Pantoja MRN: [id number] CLB:5/55/7859 About your hospitalization You were admitted on:  April 6, 2018 You last received care in the:  [address], [facility]/s Dr JOSTIN LOPES You were discharged on:  April 6, 2018 Why you were hospitalized Your primary diagnosis was:  Not on File Follow-up Information Follow up With Details Comments Contact Info Jose Merritt MD Schedule an appointment as soon as possible for a visit in 2 weeks [address] [place] [address] office [phone] [place] [address] 
[phone] Discharge Orders None A check missy indicates which time of day the medication should be taken. My Medications CONTINUE taking these medications Instructions Each Dose to Equal  
 Morning Noon Evening Bedtime  
 acetaminophen 325 mg tablet Commonly known as:  TYLENOL Your last dose was: Your next dose is: Take 2 Tabs by mouth every six (6) hours. Indications: PAIN  
 650 mg  
    
   
   
   
  
 albuterol 90 mcg/actuation inhaler Commonly known as:  PROVENTIL HFA, VENTOLIN HFA, PROAIR HFA Your last dose was: Your next dose is: Take 2 Puffs by inhalation every four (4) hours as needed for Wheezing. 2 Puff  
    
   
   
   
  
 aspirin delayed-release 81 mg tablet Your last dose was: Your next dose is: Take 81 mg by mouth daily. 81 mg  
    
   
   
   
  
 calcium acetate 667 mg Cap Commonly known as:  PHOSLO Your last dose was: Your next dose is: Take 3 Caps by mouth three (3) times daily (with meals). 3 Cap  
    
   
   
   
  
 carvedilol 6.25 mg tablet Commonly known as:  Yazmin Margarita Your last dose was: Your next dose is: Take 1 Tab by mouth every other day.   
 6.25 mg  
    
   
   
   
  
 cyclobenzaprine 10 mg tablet Commonly known as:  FLEXERIL Your last dose was: Your next dose is: TAKE ONE TABLET BY MOUTH THREE TIMES DAILY AS NEEDED FOR MUSCLE SPASM  
     
   
   
   
  
 losartan 25 mg tablet Commonly known as:  COZAAR Your last dose was: Your next dose is: Take  by mouth daily. OTHER(NON-FORMULARY) Your last dose was: Your next dose is: RENAL VITAMIN WITH VIT D PLUS  
     
   
   
   
  
 predniSONE 10 mg tablet Commonly known as:  Graydon Mima Your last dose was: Your next dose is:    
   
   
 6 tabs today and reduce by 1 tab daily  
     
   
   
   
  
 sildenafil citrate 100 mg tablet Commonly known as:  VIAGRA Your last dose was: Your next dose is: Take 1 Tab by mouth as needed. 100 mg Discharge Instructions Patient Discharge Instructions Leon Ashley / [id number] : 1955 Admitted 2018 Discharged: 2018 Take Home Medications · It is important that you take the medication exactly as they are prescribed. · Keep your medication in the bottles provided by the pharmacist and keep a list of the medication names, dosages, and times to be taken in your wallet. · Do not take other medications without consulting your doctor. BRING ALL OF YOUR MEDICINES TO YOUR OFFICE VISIT with Dr. Elif Davenport. Follow-up with Lolis Abbott MD in 2 weeks. Call [phone] to confirm your follow up appointment. Cardiac Catheterization  Discharge Instructions ? Do not drive, operate any machinery, or sign any legal documents for 24 hours after your procedure. You must have someone to drive you home. ? You may take a shower 24 hours after your cardiac catheterization.   Be sure to get the dressing wet and then remove it; gently wash the area with warm soapy water. Pat dry and leave open to air. To help prevent infections, be sure to keep the cath site clean and dry. No lotions, creams, powders, ointments, etc. in the cath site for approximately 1 week. ? Do not take a tub bath, get in a hot tub or swimming pool for approximately 5 days or until the cath site is completely healed. ? No strenuous activity or heavy lifting over 10 lbs. for 7 days. ? Drink plenty of fluids for 24-48 hours after your cath to flush the contrast dye from your kidneys. No alcoholic beverages for 24 hours. You may resume your previous diet (low fat, low cholesterol) after your cath. ? After your cath, some bruising or discomfort is common during the healing process. Tylenol, 1-2 tablets every 6 hours as needed, is recommended if you experience any discomfort. If you experience any signs or symptoms of infection such as fever, chills, or poorly healing incision, persistent tenderness or swelling in the groin, redness and/or warmth to the touch, numbness, significant tingling or pain at the groin site or affected extremity, rash, drainage from the cath site, or if the leg feels tight or swollen, call your physician right away. ? If bleeding at the cath site occurs, take a clean gauze pad and apply direct pressure to the groin just above the puncture site. Call 911 immediately, and continue to apply direct pressure until an ambulance gets to your location. ? You may return to work  2  days after your cardiac cath if no groin bleeding. Information obtained by : 
I understand that if any problems occur once I am at home I am to contact my physician. I understand and acknowledge receipt of the instructions indicated above. R.N.'s Signature                                                                  Date/Time Patient or Representative Signature                                                          Date/Time Mare Peacock MD 
 
  
  
  
Introducing [facility] [place] [facility]! [facility] introduces [employer] patient portal. Now you can access parts of your medical record, email your doctor's office, and request medication refills online. 1. In your internet browser, go to [email or web address] 2. Click on the First Time User? Click Here link in the Sign In box. You will see the New Member Sign Up page. 3. Enter your [employer] Access Code exactly as it appears below. You will not need to use this code after youve completed the sign-up process. If you do not sign up before the expiration date, you must request a new code. · [employer] Access Code: [email or web address] Expires: 7/4/2018 11:44 AM 
 
4. Enter the last four digits of your Social Security Number (xxxx) and Date of Birth (mm/dd/yyyy) as indicated and click Submit. You will be taken to the next sign-up page. 5. Create a [employer] ID. This will be your [employer] login ID and cannot be changed, so think of one that is secure and easy to remember. 6. Create a [employer] password. You can change your password at any time. 7. Enter your Password Reset Question and Answer. This can be used at a later time if you forget your password. 8. Enter your e-mail address. You will receive e-mail notification when new information is available in [address] 9. Click Sign Up. You can now view and download portions of your medical record. 10. Click the Download Summary menu link to download a portable copy of your medical information. If you have questions, please visit the Frequently Asked Questions section of the [employer] website. Remember, [employer] is NOT to be used for urgent needs. For medical emergencies, dial 911. Now available from your iPhone and Android! Introducing Venkata Lemon As a [facility] patient, I wanted to make you aware of our electronic visit tool called Venkata Lmeon. Dane Szymanski 24/7 allows you to connect within minutes with a medical provider 24 hours a day, seven days a week via a mobile device or tablet or logging into a secure website from your computer. You can access Venkata Lemon from anywhere in the [place]. A virtual visit might be right for you when you have a simple condition and feel like you just dont want to get out of bed, or cant get away from work for an appointment, when your [facility] provider is not available (evenings, weekends or holidays), or when youre out of town and need minor care. Electronic visits cost only $49 and if the [employer] [facility] 24/7 provider determines a prescription is needed to treat your condition, one can be electronically transmitted to a nearby pharmacy*. Please take a moment to enroll today if you have not already done so. The enrollment process is free and takes just a few minutes. To enroll, please download the [employer] 24/7 анна to your tablet or phone, or visit [email or web address] to enroll on your computer. And, as an [address] [place] [address] patient with a [employer] account, the results of your visits will be scanned into your electronic medical record and your primary care provider will be able to view the scanned results. We urge you to continue to see your regular [facility] provider for your ongoing medical care. And while your primary care provider may not be the one available when you seek a Venkata Lemon virtual visit, the peace of mind you get from getting a real diagnosis real time can be priceless. For more information on Venkata Lemon, view our Frequently Asked Questions (FAQs) at [email or web address]. Sincerely, 
 
Kristen Peguero MD 
Chief Medical Officer [employer] *:  certain medications cannot be prescribed via Venkata Lemon Unresulted Labs-Please follow up with your PCP about these lab tests Order Current Status METABOLIC PANEL, BASIC In process EKG, 12 LEAD, INITIAL Preliminary result Providers Seen During Your Hospitalization Provider Specialty Primary office phone Nimco Gomes MD Cardiology [phone] Your Primary Care Physician (PCP) Primary Care Physician Office Phone Office Fax [address], [address] [employer] [address] [phone] You are allergic to the following Allergen Reactions Other Food Anaphylaxis Shell fish \"LUNGS, & THROAT CLOSES UP\" MILK & CHEESE Milk Anaphylaxis Shellfish Derived Anaphylaxis Iodinated Contrast- Oral And Iv Dye Other (comments) Peanut Shortness of Breath Penicillins Unknown (comments) Recent Documentation Height Weight BMI Smoking Status 1.702 m 77.1 kg 26.63 kg/m2 Never Smoker Emergency Contacts Name Discharge Info Relation Home Work Mobile Samia Ortega DISCHARGE CAREGIVER [3] Spouse [3] [phone] Patient Belongings The following personal items are in your possession at time of discharge: 
                             
 
  
  
 Please provide this summary of care documentation to your next provider. Signatures-by signing, you are acknowledging that this After Visit Summary has been reviewed with you and you have received a copy. Patient Signature:  ____________________________________________________________ Date:  ____________________________________________________________  
  
[facility] Provider Signature:  ____________________________________________________________ Date:  ____________________________________________________________

## 2018-04-06 NOTE — PROGRESS NOTES
1730:  Patient's wife is upset because her ride might be leaving and the patient isn't supposed to be discharged until 1830. Dr. Rebecca Dallas made aware. Orders received for the patient to be discharged if the groin is stable after ambulation. 1835:  Hailey Serrano ambulated @ 1495 (time) approximately 100 feet. Patient tolerated ambulation without adverse advents. right groin (right/left, groin/arm)  without bleeding or hematoma noted. I have reviewed discharge instructions with the patient and spouse. The patient and spouse verbalized understanding.

## 2018-04-06 NOTE — IP AVS SNAPSHOT
[address] 
[phone] Patient: Domingo Newsome MRN: [id number] ILU:5/71/1382 A check missy indicates which time of day the medication should be taken. My Medications CONTINUE taking these medications Instructions Each Dose to Equal  
 Morning Noon Evening Bedtime  
 acetaminophen 325 mg tablet Commonly known as:  TYLENOL Your last dose was: Your next dose is: Take 2 Tabs by mouth every six (6) hours. Indications: PAIN  
 650 mg  
    
   
   
   
  
 albuterol 90 mcg/actuation inhaler Commonly known as:  PROVENTIL HFA, VENTOLIN HFA, PROAIR HFA Your last dose was: Your next dose is: Take 2 Puffs by inhalation every four (4) hours as needed for Wheezing. 2 Puff  
    
   
   
   
  
 aspirin delayed-release 81 mg tablet Your last dose was: Your next dose is: Take 81 mg by mouth daily. 81 mg  
    
   
   
   
  
 calcium acetate 667 mg Cap Commonly known as:  PHOSLO Your last dose was: Your next dose is: Take 3 Caps by mouth three (3) times daily (with meals). 3 Cap  
    
   
   
   
  
 carvedilol 6.25 mg tablet Commonly known as:  Sara Awe Your last dose was: Your next dose is: Take 1 Tab by mouth every other day. 6.25 mg  
    
   
   
   
  
 cyclobenzaprine 10 mg tablet Commonly known as:  FLEXERIL Your last dose was: Your next dose is: TAKE ONE TABLET BY MOUTH THREE TIMES DAILY AS NEEDED FOR MUSCLE SPASM  
     
   
   
   
  
 losartan 25 mg tablet Commonly known as:  COZAAR Your last dose was: Your next dose is: Take  by mouth daily. OTHER(NON-FORMULARY) Your last dose was: Your next dose is:  RENAL VITAMIN WITH VIT D PLUS  
     
   
   
   
  
 predniSONE 10 mg tablet Commonly known as:  Diana Kiser Your last dose was: Your next dose is:    
   
   
 6 tabs today and reduce by 1 tab daily  
     
   
   
   
  
 sildenafil citrate 100 mg tablet Commonly known as:  VIAGRA Your last dose was: Your next dose is: Take 1 Tab by mouth as needed.   
 100 mg

## 2018-04-06 NOTE — PROGRESS NOTES
Cardiac Cath Lab Procedure Area Arrival Note:    Dyana Pantoja arrived to Cardiac Cath Lab, Procedure Area. Patient identifiers verified with NAME and DATE OF BIRTH. Procedure verified with patient. Consent forms verified. Allergies verified. Patient informed of procedure and plan of care. Questions answered with review. Patient voiced understanding of procedure and plan of care. Patient on cardiac monitor, non-invasive blood pressure, SPO2 monitor. On O2 @ 2 lpm via NC.  IV of NS on pump at 25 ml/hr. Patient status doing well without problems. Patient is A&Ox 3. Patient reports no pain. Patient medicated during procedure with orders obtained and verified by Dr. Traci Orellana. Refer to patients Cardiac Cath Lab PROCEDURE REPORT for vital signs, assessment, status, and response during procedure, printed at end of case. Printed report on chart or scanned into chart. 1618  TRANSFER - OUT REPORT:    Verbal report given to Lalita on Dyana Pantoja being transferred to  for routine post - op       Report consisted of patients Situation, Background, Assessment and   Recommendations(SBAR). Information from the following report(s) Procedure Summary and MAR was reviewed with the receiving nurse. Opportunity for questions and clarification was provided.

## 2018-04-06 NOTE — PROCEDURES
Cardiac Catheterization Procedure Note   Patient: All Bolivar  MRN: [id number]  SSN: [id number]   YOB: 1955 Age: 57 y.o.   Sex: male    Date of Procedure: 4/6/2018   Pre-procedure Diagnosis: Chest pain CCS Class III  Post-procedure Diagnosis: Non-cardiac Chest Pain  Procedure: Left Heart Cath  :  Dr. Kevin Rivera MD    Assistant(s):  None  Anesthesia: Moderate Sedation   Estimated Blood Loss: Less than 10 mL   Specimens Removed: None  Findings: normal coronary arteries, LV EF 30-35%, moderately elevated LV filling pressure  Complications: None   Implants:  None  Contrast: Isovue 91 cc Isovue  Signed by:  Kevin Rivera MD  4/6/2018  4:32 PM

## 2018-04-06 NOTE — ROUTINE PROCESS
1623:  TRANSFER - IN REPORT:    Verbal report received from [address] [address] on Mellisa Fonseca , from the Cardiac Cath lab, for routine progression of care. Report consisted of patients Situation, Background, Assessment and Recommendations(SBAR). Information from the following report(s) Procedure Summary, Intake/Output, MAR and Recent Results was reviewed with the receiving clinician. Opportunity for questions and clarification was provided. Assessment completed upon patients arrival to [address] [place] [address] and care assumed. Cardiac Cath Lab Recovery Arrival Note:     Mellisa Fonseca arrived to [facility] recovery area. Patient procedure= LHC. Patient on cardiac monitor, non-invasive blood pressure, Patient status doing well without problems. Patient is A&Ox 4. Patient reports no complaints. Procedure site without any bleeding and no hematoma.

## 2018-04-07 LAB
ATRIAL RATE: 90 BPM
CALCULATED P AXIS, ECG09: 71 DEGREES
CALCULATED R AXIS, ECG10: 44 DEGREES
CALCULATED T AXIS, ECG11: 79 DEGREES
DIAGNOSIS, 93000: NORMAL
P-R INTERVAL, ECG05: 198 MS
Q-T INTERVAL, ECG07: 436 MS
QRS DURATION, ECG06: 102 MS
QTC CALCULATION (BEZET), ECG08: 533 MS
VENTRICULAR RATE, ECG03: 90 BPM

## 2018-07-24 RX ORDER — CYCLOBENZAPRINE HCL 10 MG
TABLET ORAL
Qty: 90 TAB | Refills: 0 | Status: SHIPPED | COMMUNITY
Start: 2018-07-24 | End: 2020-01-30 | Stop reason: SDUPTHER

## 2018-10-15 ENCOUNTER — OFFICE VISIT (OUTPATIENT)
Dept: CARDIOLOGY CLINIC | Age: 63
End: 2018-10-15

## 2018-10-15 VITALS
OXYGEN SATURATION: 98 % | SYSTOLIC BLOOD PRESSURE: 122 MMHG | BODY MASS INDEX: 27.15 KG/M2 | RESPIRATION RATE: 16 BRPM | DIASTOLIC BLOOD PRESSURE: 80 MMHG | HEIGHT: 67 IN | TEMPERATURE: 98.1 F | HEART RATE: 104 BPM | WEIGHT: 173 LBS

## 2018-10-15 DIAGNOSIS — I10 ESSENTIAL HYPERTENSION: ICD-10-CM

## 2018-10-15 DIAGNOSIS — Z99.2 CKD (CHRONIC KIDNEY DISEASE) STAGE V REQUIRING CHRONIC DIALYSIS (HCC): Primary | ICD-10-CM

## 2018-10-15 DIAGNOSIS — N18.6 CKD (CHRONIC KIDNEY DISEASE) STAGE V REQUIRING CHRONIC DIALYSIS (HCC): Primary | ICD-10-CM

## 2018-10-15 DIAGNOSIS — I50.43 ACUTE ON CHRONIC COMBINED SYSTOLIC AND DIASTOLIC ACC/AHA STAGE C CONGESTIVE HEART FAILURE (HCC): ICD-10-CM

## 2018-10-15 DIAGNOSIS — R06.02 SHORTNESS OF BREATH: ICD-10-CM

## 2018-10-15 RX ORDER — BISACODYL 5 MG
5 TABLET, DELAYED RELEASE (ENTERIC COATED) ORAL DAILY PRN
COMMUNITY

## 2018-10-15 NOTE — MR AVS SNAPSHOT
[facility], [address] 
[phone] Patient: Ayan Gregorio MRN: [id number] Visit Information Date & Time Provider Department Dept. Phone Encounter #  
 10/15/2018  1:00 PM Kvng Rizzo MD [address] [id number] Upcoming Health Maintenance Date Due DTaP/Tdap/Td series (1 - Tdap) 7/28/1974 Shingrix Vaccine Age 50> (1 of 2) 7/28/2003 FOBT Q 1 YEAR AGE 50-75 9/18/2015 MEDICARE YEARLY EXAM 5/26/2018 GLAUCOMA SCREENING Q2Y 7/28/2018 Pneumococcal 65+ High/Highest Risk (1 of 2 - PCV13) 7/28/2018 Influenza Age 5 to Adult 8/1/2018 Allergies as of 10/15/2018  Review Complete On: 10/15/2018 By: Julien Bowie RN Severity Noted Reaction Type Reactions Other Food High 09/24/2015    Anaphylaxis Shell fish \"LUNGS, & THROAT CLOSES UP\" MILK & CHEESE Milk High 10/13/2015    Anaphylaxis Shellfish Derived High 10/13/2015    Anaphylaxis Iodinated Contrast- Oral And Iv Dye  09/12/2012    Other (comments) Peanut  09/12/2012    Shortness of Breath Penicillins  02/18/2011    Unknown (comments) Current Immunizations  Reviewed on 10/16/2015 No immunizations on file. Not reviewed this visit You Were Diagnosed With   
  
 Codes Comments CKD (chronic kidney disease) stage V requiring chronic dialysis ([facility] [address]    -  Primary ICD-10-CM: N18.6, Z99.2 ICD-9-CM: 585.6, V45.11 Essential hypertension     ICD-10-CM: I10 
ICD-9-CM: 401.9 Acute on chronic combined systolic and diastolic ACC/AHA stage C congestive heart failure (HCC)     ICD-10-CM: I50.43 ICD-9-CM: 428.43, 428.0 Shortness of breath     ICD-10-CM: R06.02 
ICD-9-CM: 786.05 Vitals BP Pulse Temp Resp Height(growth percentile) Weight(growth percentile)  122/80 (BP 1 Location: Left arm, BP Patient Position: Sitting) (!) 104 98.1 °F (36.7 °C) 16 5' 7\" (1.702 m) 173 lb (78.5 kg) SpO2 BMI Smoking Status 98% 27.1 kg/m2 Never Smoker Vitals History BMI and BSA Data Body Mass Index Body Surface Area  
 27.1 kg/m 2 1.93 m 2 Preferred Pharmacy Pharmacy Name Phone [facility] [address], [address] [place] [phone] Your Updated Medication List  
  
   
This list is accurate as of 10/15/18  1:27 PM.  Always use your most recent med list.  
  
  
  
  
 acetaminophen 325 mg tablet Commonly known as:  TYLENOL Take 2 Tabs by mouth every six (6) hours. Indications: PAIN  
  
 albuterol 90 mcg/actuation inhaler Commonly known as:  PROVENTIL HFA, VENTOLIN HFA, PROAIR HFA Take 2 Puffs by inhalation every four (4) hours as needed for Wheezing. aspirin delayed-release 81 mg tablet Take 81 mg by mouth daily. calcium acetate 667 mg Cap Commonly known as:  PHOSLO Take 3 Caps by mouth three (3) times daily (with meals). carvedilol 6.25 mg tablet Commonly known as:  Alexis Sella Take 12.5 mg by mouth every other day. cyclobenzaprine 10 mg tablet Commonly known as:  FLEXERIL  
TAKE ONE TABLET BY MOUTH THREE TIMES DAILY AS NEEDED FOR MUSCLE SPASM DULCOLAX (BISACODYL) 5 mg EC tablet Generic drug:  bisacodyl Take 5 mg by mouth daily as needed for Constipation. losartan 25 mg tablet Commonly known as:  COZAAR Take  by mouth every other day. OTHER(NON-FORMULARY) RENAL VITAMIN WITH VIT D PLUS  
  
 predniSONE 10 mg tablet Commonly known as:  DELTASONE  
6 tabs today and reduce by 1 tab daily  
  
 sildenafil citrate 100 mg tablet Commonly known as:  VIAGRA Take 1 Tab by mouth as needed. We Performed the Following HALIE PANEL P442783 CPT(R)] CBC W/O DIFF [10373 CPT(R)] FREE LIGHT CHAINS, KAPPA/LAMBDA, QT [94593 CPT(R)] IRON PROFILE [id number] CPT(R)] SLEEP MEDICINE REFERRAL [LML308 Custom] Comments:  
 Orders: 
Sleep Medicine Consult - Schedule patient for a sleep specialist consult. If appropriate, schedule patient for sleep study(s). Initiate treatment if needed. Forward correspondance to my office. THYROID PANEL W/TSH [53868 CPT(R)] To-Do List   
 10/31/2018 Lab:  GAMMOPATHY EVAL, SPEP/RAMONITA, IG QT/FLC Referral Information Referral ID Referred By Referred To  
  
 [id number] Pillo Luo MD   
   [address] [facility] [address] [facility], [address] Phone: [phone] Fax: [phone] Visits Status Start Date End Date 1 New Request 10/15/18 10/15/19 If your referral has a status of pending review or denied, additional information will be sent to support the outcome of this decision. Patient Instructions Sleep Study Follow up in one month Labs with next Dialysis Introducing [facility] SERVICES! [address] introduces Brain[employer] patient portal. Now you can access parts of your medical record, email your doctor's office, and request medication refills online. 1. In your internet browser, go to [email or web address] 2. Click on the First Time User? Click Here link in the Sign In box. You will see the New Member Sign Up page. 3. Enter your Brainrack Access Code exactly as it appears below. You will not need to use this code after youve completed the sign-up process. If you do not sign up before the expiration date, you must request a new code. · Brain[employer] Access Code: [email or web address] Expires: 1/13/2019  1:27 PM 
 
4. Enter the last four digits of your Social Security Number (xxxx) and Date of Birth (mm/dd/yyyy) as indicated and click Submit. You will be taken to the next sign-up page. 5. Create a Brainrack ID. This will be your Brainrack login ID and cannot be changed, so think of one that is secure and easy to remember. 6. Create a AppFirstt password. You can change your password at any time. 7. Enter your Password Reset Question and Answer. This can be used at a later time if you forget your password. 8. Enter your e-mail address. You will receive e-mail notification when new information is available in [address] 9. Click Sign Up. You can now view and download portions of your medical record. 10. Click the Download Summary menu link to download a portable copy of your medical information. If you have questions, please visit the Frequently Asked Questions section of the Filmijob website. Remember, Film[employer] is NOT to be used for urgent needs. For medical emergencies, dial 911. Now available from your iPhone and Android! Please provide this summary of care documentation to your next provider. Your primary care clinician is listed as Elaine Boston. If you have any questions after today's visit, please call [phone].

## 2018-10-15 NOTE — PROGRESS NOTES
[address] [place] [address] [facility] [place]  Outpatient Clinic Note    Patient name: Ceasar Mathis  Patient : 1955  Patient MRN: [id number]  Date of service: 10/15/18    Primary care physician: Trini Palencia MD  Primary cardiologist: Dr. Trice Maddox MD       CHIEF COMPLAINT:  Chronic systolic heart failure    RECOMMENDATIONS:  High risk candidate for renal transplantation given severe pulmonary hypertension w/severe TR by echo. Heart failure with preserved EF appears compensated on adequate medical therapy. Would recommend to complete workup for pulmonary hypertension and TV evaluation  Will start with scheduling sleep study and VQ scan  Add to next dialysis labs: CBC, INR, iron profile, HALIE, thyroid profile, serum free chains, SFC, SPEP/IF, HIT profile, TOMI, sed rate, thyroid profile  Next step will be to consider HRCT and RHC+/-nitric challenge, possibly after at least one month of CPAP treatment  Continue current medical therapy for heart failure  Continue current dose of coreg and cozaar (hold on dialysis days due to hypotension)  Volume management per dialysis  Reinforced low sal diet  Reinforced fluid restriction to 6 x 8oz glasses per day  Follow-up with primary cardiologist  Follow-up with PCP  Recommend flu shot annually and pneumonia shot every 5 years  Return to [facility] in one month with results of the above and MD visit    IMPRESSION:  Fatigue  Chronic systolic heart failure  Stage C, NYHA class II symptoms  Heart failure with preserved ejection fraction  Mild LV dysfunction, LVEF 40-45%  Severe pulmonary hypertension by echo  Severe tricuspid regurgitation  Non-restorative sleep, possible HANDY    CARDIAC IMAGING:  Echo (18) LVEF 40-45%, LVEDD 5.1cm, IVS 1.3cm, , RV size and function normal, RV pressure 91mmHg, severe TR, IVC dilated with partial collapse with inspiratio.    EKG (18) NSR, LVH  LHC (18)  normal coronary arteries, LV EF 30-35%, moderately elevated LV filling pressure  NST (2/21/18) abnormal with an inferior wall defect with a moderate amount of reperfusion, LVEF 38%    HEMODYNAMICS:  RHC not done  CPEST not done  6MW not done    HISTORY OF PRESENT ILLNESS:  I had the pleasure of seeing Meño Merino in [address] at [facility] in [place]. Briefly, Meño Merino is a 70 y.o. male with h/o HTN, ESRD on HD. Patient was initially referred to Dr. Xavier Ochoa for exertional chest pain while exercising at the gym; he also was interested in cardiac clearance for renal transplantation. Patient had a myocardial perfusion imaging study on 02/21/2018, which was abnormal with an inferior wall defect with a moderate amount of reperfusion with an ejection fraction of 38%. He previously had coronary angiography in 2014 and had nonocclusive disease at that time. LHC was repeated in April 2018 and showed normal coronaries, LVEF 35% and elevated filling pressures at weight 169lbs. He was started on medical therap for heart failure and echocardiogram was repeated in September 2018. It showed mild LV dysfunction LVEF 40-45%, severe pulmonary hypertension RVSP 91mmHg and severe TR with normal RV size and function. INTERVAL HISTORY:  Today, patient presents for routine clinic visit. Patient is doing very well. He denies any further pain with exertion. He also admits he confused taking blood pressure medications and after he stopped taking them on dialysis days, his dizziness has resolved. His question surround his candidacy for renal transplantation from cardiac perspective. Patient walked to our clinic from parking garage without having to slow down or stop. He can walk more than one block without symptoms of fatigue or shortness of breath. Patient can walk one flight of stairs without symptoms of fatigue or shortness of breath.  He can perform home activities without problem and routinely participates in daily walking for more than 15 minutes. Patient denies symptoms of volume overload, abdominal bloating or leg edema. Patient's weight remained stable. . Patient denies weight gain or weight loss, or change of appetite. Patient denies irregular heart rate or palpitations. Patient denies other cardiac symptoms such as chest pain or leg pain with walking. Patient is compliant with fluid restriction and taking medications as prescribed. Patient manages medications himself. REVIEW OF SYSTEMS:  General: Denies fever, night sweats. Ear, nose and throat: Denies difficulty hearing, sinus problems, runny nose, post-nasal drip, ringing in ears, mouth sores, loose teeth, ear pain, nosebleeds, sore throate, facial pain or numbess  Cardiovascular: see above in the interval history  Respiratory: Denies cough, wheezing, sputum production, hemoptysis. Gastrointestinal: Denies heartburn, constipation, intolerance to certain foods, diarrhea, abdominal pain, nausea, vomiting, difficulty swallowing, blood in stool  Kidney and bladder: Denies painful urination, frequent urination, urgency, prostate problems and impotence  Musculoskeletal: Denies joint pain, muscle weakness  Skin and hair: Denies change in existing skin lesions, hair loss or increase, breast changes    PHYSICAL EXAM:  Vital signs:   Visit Vitals    /80 (BP 1 Location: Left arm, BP Patient Position: Sitting)    Pulse (!) 104    Temp 98.1 °F (36.7 °C)    Resp 16    Ht 5' 7\" (1.702 m)    Wt 173 lb (78.5 kg)    SpO2 98%    BMI 27.1 kg/m2     General: Patient is well developed, well-nourished in no acute distress  HEENT: Normocephalic and atraumatic. No scleral icterus. Pupils are equal, round and reactive to light and accomodation. No conjunctival injection. Oropharynx is clear. Neck: Supple. No evidence of thyroid enlargements or lymphadenopathy. JVD: Cannot be appreciated   Lungs: Breath sounds are equal and clear bilaterally.  No wheezes, rhonchi, or rales. Heart: Regular rate and rhythm with normal S1 and S2. No murmurs, gallops or rubs. Abdomen: Soft, no mass or tenderness. No organomegaly or hernia. Bowel sounds present. Genitourinary and rectal: deferred  Extremities: No cyanosis, clubbing, or edema. Neurologic: No focal sensory or motor deficits are noted. Grossly intact. Psychiatric: Awake, alert an doriented x 3. Appropriate mood and affect. Skin: Warm, dry and well perfused. No lesions, nodules or rashes are noted.     PAST MEDICAL HISTORY:  Past Medical History:   Diagnosis Date    Adverse effect of anesthesia     \"MY BLOOD PRESSURE DROPS ,\"    Alzheimer's disease 2/18/2011    PT DENIES    Chronic kidney disease     KIDNEYS FAILED R/T HTN    Chronic pain     LEFT HIP PAIN    Heart failure ([facility] 6/2015    CHF    Hemodialysis patient ([facility] [address]     M-W-F    Osteoarthrosis, unspecified whether generalized or localized, unspecified site 2/18/2011    Other unknown and unspecified cause of morbidity or mortality     PT DOSES OFF FREQUENTLY    PUD (peptic ulcer disease)     Renal Disease 2/18/2011    Right inguinal hernia 3/1/2016    Unspecified asthma(493.90) 2/18/2011    Unspecified essential hypertension 2/18/2011    Ventral incisional hernia 3/1/2016       PAST SURGICAL HISTORY:  Past Surgical History:   Procedure Laterality Date    CARDIAC SURG PROCEDURE UNLIST  4/29/15    CARDIAC CATHGERIZATION    HX GI      COLONOSCOPY    HX HEENT      TONSILLECTOMY    HX HERNIA REPAIR  3/8/16    Repair of right inguinal hernia with mesh,Repair of ventral incisional hernia with mesh    HX ORTHOPAEDIC Right 10/13/15     THR    HX ORTHOPAEDIC      left hip    HX OTHER SURGICAL  10/11/2016    revision of AV fistula left arm with repair of pseudoaneurysm    HX SMALL BOWEL RESECTION      HX UROLOGICAL Bilateral 2005    REMOVAL OF KIDNEYS    HX VASCULAR ACCESS      LT ARM WITH PORTS FOR DYALISIS       FAMILY HISTORY:  Family History   Problem Relation Age of Onset    Cancer Mother      BREAST    Cancer Sister      THROAT    Stroke Brother     Anesth Problems Neg Hx        SOCIAL HISTORY:  Social History     Social History    Marital status:      Spouse name: N/A    Number of children: N/A    Years of education: N/A     Social History Main Topics    Smoking status: Never Smoker    Smokeless tobacco: Never Used    Alcohol use No    Drug use: No    Sexual activity: Not Asked     Other Topics Concern    None     Social History Narrative       LABORATORY RESULTS:   No flowsheet data found. Lab Results   Component Value Date/Time    TSH 3.23 11/04/2015 11:29 AM       CURRENT MEDICATIONS:    Current Outpatient Prescriptions:     bisacodyl (DULCOLAX, BISACODYL,) 5 mg EC tablet, Take 5 mg by mouth daily as needed for Constipation. , Disp: , Rfl:     cyclobenzaprine (FLEXERIL) 10 mg tablet, TAKE ONE TABLET BY MOUTH THREE TIMES DAILY AS NEEDED FOR MUSCLE SPASM, Disp: 90 Tab, Rfl: 0    carvedilol (COREG) 6.25 mg tablet, Take 12.5 mg by mouth every other day., Disp: 60 Tab, Rfl: 12    losartan (COZAAR) 25 mg tablet, Take  by mouth every other day., Disp: , Rfl:     calcium acetate (PHOSLO) 667 mg cap, Take 3 Caps by mouth three (3) times daily (with meals). , Disp: , Rfl:     aspirin delayed-release 81 mg tablet, Take 81 mg by mouth daily. , Disp: , Rfl:     acetaminophen (TYLENOL) 325 mg tablet, Take 2 Tabs by mouth every six (6) hours. Indications: PAIN, Disp: 60 Tab, Rfl: 0    OTHER,NON-FORMULARY,, RENAL VITAMIN WITH VIT D PLUS, Disp: , Rfl:     albuterol (PROVENTIL HFA, VENTOLIN HFA, PROAIR HFA) 90 mcg/actuation inhaler, Take 2 Puffs by inhalation every four (4) hours as needed for Wheezing., Disp: 1 Inhaler, Rfl: 12    sildenafil citrate (VIAGRA) 100 mg tablet, Take 1 Tab by mouth as needed. , Disp: 8 Tab, Rfl: 12    predniSONE (DELTASONE) 10 mg tablet, 6 tabs today and reduce by 1 tab daily, Disp: 21 Tab, Rfl: 3      Thank you for allowing me to participate in this patient's care.     Otis Haque MD PhD  [address] [place] [address], [address]  Phone: [phone]  Fax: [phone]

## 2018-10-23 ENCOUNTER — OFFICE VISIT (OUTPATIENT)
Dept: INTERNAL MEDICINE CLINIC | Age: 63
End: 2018-10-23

## 2018-10-23 VITALS
HEART RATE: 111 BPM | SYSTOLIC BLOOD PRESSURE: 90 MMHG | WEIGHT: 173 LBS | RESPIRATION RATE: 16 BRPM | BODY MASS INDEX: 27.15 KG/M2 | HEIGHT: 67 IN | DIASTOLIC BLOOD PRESSURE: 60 MMHG | OXYGEN SATURATION: 93 % | TEMPERATURE: 98.3 F

## 2018-10-23 DIAGNOSIS — Z99.2 STAGE 5 CHRONIC KIDNEY DISEASE ON CHRONIC DIALYSIS (HCC): ICD-10-CM

## 2018-10-23 DIAGNOSIS — G47.33 OBSTRUCTIVE SLEEP APNEA SYNDROME: ICD-10-CM

## 2018-10-23 DIAGNOSIS — N18.6 STAGE 5 CHRONIC KIDNEY DISEASE ON CHRONIC DIALYSIS (HCC): ICD-10-CM

## 2018-10-23 DIAGNOSIS — D64.9 FATIGUE ASSOCIATED WITH ANEMIA: ICD-10-CM

## 2018-10-23 DIAGNOSIS — I10 ESSENTIAL HYPERTENSION: Primary | ICD-10-CM

## 2018-10-23 LAB
CHOLEST SERPL-MCNC: 242 MG/DL
HDLC SERPL-MCNC: 45 MG/DL
LDL CHOLESTEROL POC: 162 MG/DL
NON-HDL CHOLESTEROL, 011976: 197
TCHOL/HDL RATIO (POC): 5.4
TRIGL SERPL-MCNC: 177 MG/DL

## 2018-10-23 NOTE — PROGRESS NOTES
1. Have you been to the ER, urgent care clinic since your last visit? Hospitalized since your last visit?no    2. Have you seen or consulted any other health care providers outside of the [address] [place] [address] since your last visit? Include any pap smears or colon screening. no    PHQ over the last two weeks 10/15/2018   PHQ Not Done -   Little interest or pleasure in doing things Not at all   Feeling down, depressed, irritable, or hopeless Not at all   Total Score PHQ 2 0     Chief Complaint   Patient presents with    Annual Wellness Visit     Signed lipid per Dr. Arabella Arora.

## 2018-10-23 NOTE — PATIENT INSTRUCTIONS
[employer] Activation    Thank you for requesting access to [employer]. Please follow the instructions below to securely access and download your online medical record. [employer] allows you to send messages to your doctor, view your test results, renew your prescriptions, schedule appointments, and more. How Do I Sign Up? 1. In your internet browser, go to [email or web address]  2. Click on the First Time User? Click Here link in the Sign In box. You will be redirect to the New Member Sign Up page. 3. Enter your [employer] Access Code exactly as it appears below. You will not need to use this code after youve completed the sign-up process. If you do not sign up before the expiration date, you must request a new code. [employer] Access Code: [email or web address]  Expires: 2019  1:27 PM (This is the date your [employer] access code will )    4. Enter the last four digits of your Social Security Number (xxxx) and Date of Birth (mm/dd/yyyy) as indicated and click Submit. You will be taken to the next sign-up page. 5. Create a Can[employer] ID. This will be your [employer] login ID and cannot be changed, so think of one that is secure and easy to remember. 6. Create a [employer] password. You can change your password at any time. 7. Enter your Password Reset Question and Answer. This can be used at a later time if you forget your password. 8. Enter your e-mail address. You will receive e-mail notification when new information is available in [address] 9. Click Sign Up. You can now view and download portions of your medical record. 10. Click the Download Summary menu link to download a portable copy of your medical information. Additional Information    If you have questions, please visit the Frequently Asked Questions section of the [employer] website at [email or web address] Remember, [employer] is NOT to be used for urgent needs. For medical emergencies, dial 911.

## 2018-10-23 NOTE — PROGRESS NOTES
Cherie Randhawa is a 70 y.o. male and presents with Annual Wellness Visit  . Subjective:  Hypertension Review:  The patient has essential hypertension  Diet and Lifestyle: generally follows a  low sodium diet, exercises sporadically  Home BP Monitoring: is not measured at home. Pertinent ROS: taking medications as instructed, no medication side effects noted, no TIA's, no chest pain on exertion, no dyspnea on exertion, no swelling of ankles. Chronic Renal Disease Review:  HPI: Cherie Randhawa, ([id number]) is a 70 y.o. male who returns for follow up of   chronic renal disease caused by unknown etiology. te patient  is followed by renal  The patient has experienced the following symptoms: no problems   The patient has not experienced any of the following symptoms: no problems   Prescribed diet is JORDY   The following actions have been prescribed for slowing the   progression of CRF: cardiovascular risk factor reduction including male gender, hypertension   The patientis  on dialysis  HE STATES HE WANTS A TRANSPLANT. Asthma Review:  The patient is being seen for follow up of asthma,  currently stable. Asthma symptoms occur: infrequently. Wheezing when present is described as mild and easily relieved with rescue bronchodilator. The patient reports use of a steroid inhaler. Frequency of use of quick-relief meds: rarely. Regimen compliance: The patient reports adherence to this regimen. Review of Systems  Constitutional: negative for fevers, chills, anorexia and weight loss  Eyes:   negative for visual disturbance and irritation  ENT:   negative for tinnitus,sore throat,nasal congestion,ear pains. hoarseness  Respiratory:  negative for cough, hemoptysis, dyspnea,wheezing  CV:   negative for chest pain, palpitations, lower extremity edema  GI:   negative for nausea, vomiting, diarrhea, abdominal pain,melena  Endo:               negative for polyuria,polydipsia,polyphagia,heat intolerance  Genitourinary: negative for frequency, dysuria and hematuria  Integument:  negative for rash and pruritus  Hematologic:  negative for easy bruising and gum/nose bleeding  Musculoskel: negative for myalgias, arthralgias, back pain, muscle weakness, joint pain  Neurological:  negative for headaches, dizziness, vertigo, memory problems and gait   Behavl/Psych: negative for feelings of anxiety, depression, mood changes    Past Medical History:   Diagnosis Date    Adverse effect of anesthesia     \"MY BLOOD PRESSURE DROPS ,\"    Alzheimer's disease 2/18/2011    PT DENIES    Chronic kidney disease     KIDNEYS FAILED R/T HTN    Chronic pain     LEFT HIP PAIN    Heart failure ([facility] [address] 6/2015    CHF    Hemodialysis patient ([facility] [address]     M-W-F    Osteoarthrosis, unspecified whether generalized or localized, unspecified site 2/18/2011    Other unknown and unspecified cause of morbidity or mortality     PT DOSES OFF FREQUENTLY    PUD (peptic ulcer disease)     Renal Disease 2/18/2011    Right inguinal hernia 3/1/2016    Unspecified asthma(493.90) 2/18/2011    Unspecified essential hypertension 2/18/2011    Ventral incisional hernia 3/1/2016     Past Surgical History:   Procedure Laterality Date    CARDIAC SURG PROCEDURE UNLIST  4/29/15    CARDIAC CATHGERIZATION    HX GI      COLONOSCOPY    HX HEENT      TONSILLECTOMY    HX HERNIA REPAIR  3/8/16    Repair of right inguinal hernia with mesh,Repair of ventral incisional hernia with mesh    HX ORTHOPAEDIC Right 10/13/15     THR    HX ORTHOPAEDIC      left hip    HX OTHER SURGICAL  10/11/2016    revision of AV fistula left arm with repair of pseudoaneurysm    HX SMALL BOWEL RESECTION      HX UROLOGICAL Bilateral 2005    REMOVAL OF KIDNEYS    HX VASCULAR ACCESS      LT ARM WITH PORTS FOR DYALISIS     Social History     Socioeconomic History    Marital status:      Spouse name: Not on file    Number of children: Not on file    Years of education: Not on file    Highest education level: Not on file   Social Needs    Financial resource strain: Not on file    Food insecurity - worry: Not on file    Food insecurity - inability: Not on file    Transportation needs - medical: Not on file   Pyreg needs - non-medical: Not on file   Occupational History    Not on file   Tobacco Use    Smoking status: Never Smoker    Smokeless tobacco: Never Used   Substance and Sexual Activity    Alcohol use: No    Drug use: No    Sexual activity: Not on file   Other Topics Concern    Not on file   Social History Narrative    Not on file     Family History   Problem Relation Age of Onset    Cancer Mother         BREAST    Cancer Sister         THROAT    Stroke Brother     Anesth Problems Neg Hx      Current Outpatient Medications   Medication Sig Dispense Refill    cyclobenzaprine (FLEXERIL) 10 mg tablet TAKE ONE TABLET BY MOUTH THREE TIMES DAILY AS NEEDED FOR MUSCLE SPASM 90 Tab 0    carvedilol (COREG) 6.25 mg tablet Take 12.5 mg by mouth every other day. 60 Tab 12    sildenafil citrate (VIAGRA) 100 mg tablet Take 1 Tab by mouth as needed. 8 Tab 12    predniSONE (DELTASONE) 10 mg tablet 6 tabs today and reduce by 1 tab daily 21 Tab 3    losartan (COZAAR) 25 mg tablet Take  by mouth every other day.  calcium acetate (PHOSLO) 667 mg cap Take 3 Caps by mouth three (3) times daily (with meals).  aspirin delayed-release 81 mg tablet Take 81 mg by mouth daily.  acetaminophen (TYLENOL) 325 mg tablet Take 2 Tabs by mouth every six (6) hours. Indications: PAIN 60 Tab 0    OTHER,NON-FORMULARY, RENAL VITAMIN WITH VIT D PLUS      albuterol (PROVENTIL HFA, VENTOLIN HFA, PROAIR HFA) 90 mcg/actuation inhaler Take 2 Puffs by inhalation every four (4) hours as needed for Wheezing. 1 Inhaler 12    bisacodyl (DULCOLAX, BISACODYL,) 5 mg EC tablet Take 5 mg by mouth daily as needed for Constipation.        Allergies   Allergen Reactions    Other Food Anaphylaxis     Shell fish \"LUNGS, & THROAT CLOSES UP\"  MILK & CHEESE    Milk Anaphylaxis    Shellfish Derived Anaphylaxis    Iodinated Contrast- Oral And Iv Dye Other (comments)    Peanut Shortness of Breath    Penicillins Unknown (comments)       Objective:  Visit Vitals  BP 90/60   Pulse (!) 111   Temp 98.3 °F (36.8 °C) (Oral)   Resp 16   Ht 5' 7\" (1.702 m)   Wt 173 lb (78.5 kg)   SpO2 93%   BMI 27.10 kg/m²     Physical Exam:   General appearance - alert, well appearing, and in no distress  Mental status - alert, oriented to person, place, and time  EYE-ILIANA, EOMI, corneas normal, no foreign bodies  ENT-ENT exam normal, no neck nodes or sinus tenderness  Nose - normal and patent, no erythema, discharge or polyps  Mouth - mucous membranes moist, pharynx normal without lesions  Neck - supple, no significant adenopathy   Chest - clear to auscultation, no wheezes, rales or rhonchi, symmetric air entry   Heart - normal rate, regular rhythm, normal S1, S2, no murmurs, rubs, clicks or gallops   Abdomen - soft, nontender, nondistended, no masses or organomegaly  Lymph- no adenopathy palpable  Ext-peripheral pulses normal, no pedal edema, no clubbing or cyanosis  Skin-Warm and dry. no hyperpigmentation, vitiligo, or suspicious lesions  Neuro -alert, oriented, normal speech, no focal findings or movement disorder noted  Neck-normal C-spine, no tenderness, full ROM without pain  Feet-no nail deformities or callus formation with good pulses noted      Results for orders placed or performed in visit on 10/23/18   AMB POC LIPID PROFILE   Result Value Ref Range    Cholesterol (POC) 242     Triglycerides (POC) 177     HDL Cholesterol (POC) 45     Non-HDL Cholesterol 197     LDL Cholesterol (POC) 162 MG/DL    TChol/HDL Ratio (POC) 5.4        Assessment/Plan:    ICD-10-CM ICD-9-CM    1.  Essential hypertension I10 401.9 AMB POC LIPID PROFILE     Orders Placed This Encounter    AMB POC LIPID PROFILE     lose weight, follow low fat diet, follow low salt diet, continue present plan,Take 81mg aspirin daily  There are no Patient Instructions on file for this visit. Follow-up Disposition: Not on File      I have reviewed with the patient details of the assessment and plan and all questions were answered. Relevent patient education was performed. The most recent lab findings were reviewed with the patient. An After Visit Summary was printed and given to the patient.

## 2018-10-25 ENCOUNTER — OFFICE VISIT (OUTPATIENT)
Dept: SLEEP MEDICINE | Age: 63
End: 2018-10-25

## 2018-10-25 VITALS
HEIGHT: 67 IN | WEIGHT: 171 LBS | SYSTOLIC BLOOD PRESSURE: 96 MMHG | OXYGEN SATURATION: 99 % | DIASTOLIC BLOOD PRESSURE: 67 MMHG | HEART RATE: 79 BPM | BODY MASS INDEX: 26.84 KG/M2

## 2018-10-25 DIAGNOSIS — G47.33 OSA (OBSTRUCTIVE SLEEP APNEA): Primary | ICD-10-CM

## 2018-10-25 DIAGNOSIS — Z86.79 H/O CHF: ICD-10-CM

## 2018-10-25 DIAGNOSIS — I10 ESSENTIAL HYPERTENSION: ICD-10-CM

## 2018-10-25 NOTE — PATIENT INSTRUCTIONS
Heart Failure and Sleep Apnea: After Your Visit  Your Care Instructions  Sleep apnea is fairly common in people with advanced heart failure. Sleep apnea means you stop breathing for 10 seconds or longer during sleep. It may cause you to snore loudly and not sleep well, so you wake up feeling tired. Getting treatment for sleep apnea can help you sleep and feel better. It may also help keep your heart failure from getting worse. Follow-up care is a key part of your treatment and safety. Be sure to make and go to all appointments, and call your doctor if you are having problems. Its also a good idea to know your test results and keep a list of the medicines you take. How can you care for yourself at home? · Lose weight, if needed. It may reduce the number of times you stop breathing or have slowed breathing. · Go to bed at the same time every night. · Sleep on your side. It may stop mild apnea. If you tend to roll onto your back, sew a pocket in the back of your pajama top. Put a tennis ball into the pocket, and stitch the pocket shut. This will help keep you from sleeping on your back. · Avoid alcohol and medicines such as sleeping pills and sedatives before bed. · Do not smoke. Smoking can make heart failure and sleep apnea worse. If you need help quitting, talk to your doctor about stop-smoking programs and medicines. These can increase your chances of quitting for good. · Prop up the head of your bed 4 to 6 inches by putting bricks under the legs of the bed. · Use a continuous positive airway pressure (CPAP) breathing machine if lifestyle changes do not help your apnea and your doctor recommends it. The machine keeps your airway from closing when you sleep. · If CPAP does not help you, ask your doctor if you should try other breathing machines. A bilevel positive airway pressure machine has two types of air pressure--one for breathing in and one for breathing out.  Another device raises or lowers air pressure as needed while you breathe. · If your nose feels dry or bleeds when you use one of these machines, talk with your doctor about increasing moisture in the air. A humidifier may help. · If your nose is runny or stuffy from using a breathing machine, talk with your doctor before using medicines to relieve congestion. When should you call for help? Call your doctor now or seek immediate medical care if:  · You are dizzy or lightheaded, or you feel like you may faint. · You feel very tired. Watch closely for changes in your health, and be sure to contact your doctor if:  · You still have sleep apnea even though you have made lifestyle changes. · You are thinking of trying a device such as CPAP. · You are having problems using a CPAP or similar machine. Where can you learn more? Go to Prowl.be  Enter A820 in the search box to learn more about \"Heart Failure and Sleep Apnea: After Your Visit. \"   © 8735-6334 Healthwise, Incorporated. Care instructions adapted under license by [employer] (which disclaims liability or warranty for this information). This care instruction is for use with your licensed healthcare professional. If you have questions about a medical condition or this instruction, always ask your healthcare professional. [place] [address] any warranty or liability for your use of this information. Content Version: 7.7.718334;  Last Revised: February 22, 2012

## 2018-10-25 NOTE — PROGRESS NOTES
[address] [facility] [place], [address]  Tel.  [phone]  Fax. [address] [facility] [address]  [place], [address] [facility]  Tel.  [phone]  Fax. [phone] [address]   Tel.  [phone]  Fax. [phone]         Subjective:      Brandan Mancera is an 70 y.o. male referred for evaluation for a sleep disorder. He complains of snoring associated with excessive daytime sleepiness. Symptoms began several years ago, unchanged since that time. He usually can fall asleep in 5 minutes. Family or house members note snoring. He denies completely or partially paralyzed while falling asleep or waking up. Brandan Mancera does not wake up frequently at night. He is not bothered by waking up too early and left unable to get back to sleep. He actually sleeps about 4 hours at night and wakes up about 3 times during the night. He   work shifts:  .   Abner Ya indicates he does get too little sleep at night. His bedtime is 2300. He awakens at 0400. He does take naps. He takes 6 naps a week lasting 3, Hour(s). He has the following observed behaviors:  ;  .  Other remarks:   He denies of an urge to move extremities due to discomfort or other sensation and denies of dream enactment behavior. [place] Sleepiness Score: 15 which reflect severe daytime drowsiness. Allergies   Allergen Reactions    Other Food Anaphylaxis     Shell fish \"LUNGS, & THROAT CLOSES UP\"  MILK & CHEESE    Milk Anaphylaxis    Shellfish Derived Anaphylaxis    Iodinated Contrast- Oral And Iv Dye Other (comments)    Peanut Shortness of Breath    Penicillins Unknown (comments)         Current Outpatient Medications:     bisacodyl (DULCOLAX, BISACODYL,) 5 mg EC tablet, Take 5 mg by mouth daily as needed for Constipation. , Disp: , Rfl:     cyclobenzaprine (FLEXERIL) 10 mg tablet, TAKE ONE TABLET BY MOUTH THREE TIMES DAILY AS NEEDED FOR MUSCLE SPASM, Disp: 90 Tab, Rfl: 0    carvedilol (COREG) 6.25 mg tablet, Take 12.5 mg by mouth every other day., Disp: 60 Tab, Rfl: 12    sildenafil citrate (VIAGRA) 100 mg tablet, Take 1 Tab by mouth as needed. , Disp: 8 Tab, Rfl: 12    predniSONE (DELTASONE) 10 mg tablet, 6 tabs today and reduce by 1 tab daily, Disp: 21 Tab, Rfl: 3    losartan (COZAAR) 25 mg tablet, Take  by mouth every other day., Disp: , Rfl:     calcium acetate (PHOSLO) 667 mg cap, Take 3 Caps by mouth three (3) times daily (with meals). , Disp: , Rfl:     aspirin delayed-release 81 mg tablet, Take 81 mg by mouth daily. , Disp: , Rfl:     acetaminophen (TYLENOL) 325 mg tablet, Take 2 Tabs by mouth every six (6) hours. Indications: PAIN, Disp: 60 Tab, Rfl: 0    OTHER,NON-FORMULARY,, RENAL VITAMIN WITH VIT D PLUS, Disp: , Rfl:     albuterol (PROVENTIL HFA, VENTOLIN HFA, PROAIR HFA) 90 mcg/actuation inhaler, Take 2 Puffs by inhalation every four (4) hours as needed for Wheezing., Disp: 1 Inhaler, Rfl: 12     He  has a past medical history of Adverse effect of anesthesia, Alzheimer's disease, Chronic kidney disease, Chronic pain, Heart failure ([address] Hemodialysis patient ([address] Osteoarthrosis, unspecified whether generalized or localized, unspecified site, Other unknown and unspecified cause of morbidity or mortality, PUD (peptic ulcer disease), Renal Disease, Right inguinal hernia, Unspecified asthma(493.90), Unspecified essential hypertension, and Ventral incisional hernia. He  has a past surgical history that includes hx heent; pr cardiac surg procedure unlist (4/29/15); hx vascular access; hx gi; hx small bowel resection; hx urological (Bilateral, 2005); hx hernia repair (3/8/16); hx orthopaedic (Right, 10/13/15 ); hx orthopaedic; hx other surgical (10/11/2016);  REVISION ARTERIO VENOUS FISTULA LEFT ARM (Left, 10/11/2016); REPAIR RIGHT INGUINAL HERNIA WITH MESH, REPAIR VENTRAL INCISIONAL HERNIA WITH MESH (Right, 3/8/2016); RIGHT TOTAL HIP REPLACEMENT ANTERIOR APPROACH (CHOICE) (Right, 10/13/2015); and ARTERIO VENOUS FISTULA/GRAFT ARM (Left, 9/12/2012). He family history includes Cancer in his mother and sister; Stroke in his brother. He  reports that  has never smoked. he has never used smokeless tobacco. He reports that he does not drink alcohol or use drugs. Review of Systems:  Constitutional:  No significant weight loss or weight gain  Eyes:  No blurred vision  CVS:  No significant chest pain  Pulm:  No significant shortness of breath  GI:  No significant nausea or vomiting  :  No significant nocturia  Musculoskeletal:  No significant joint pain at night  Skin:  No significant rashes  Neuro:  No significant dizziness   Psych:  No active mood issues    Sleep Review of Systems: notable for no difficulty falling asleep; infrequent awakenings at night;  regular dreaming noted; nightmares ; no early morning headaches; no memory problems; no concentration issues; no history of any automobile or occupational accidents due to daytime drowsiness. Objective:     Visit Vitals  BP 96/67   Pulse 79   Ht 5' 7\" (1.702 m)   Wt 171 lb (77.6 kg)   SpO2 99%   BMI 26.78 kg/m²         General:   Not in acute distress   Eyes:  Anicteric sclerae, no obvious strabismus   Nose:  No obvious nasal septum deviation    Oropharynx:   Class 4 oropharyngeal outlet, thick tongue base, uvula could not be seen due to low-lying soft palate, narrow tonsilo-pharyngeal pilars   Tonsils:   tonsils are not seen due to low-lying soft palate   Neck:   Neck circ. in \"inches\": 14.5; midline trachea   Chest/Lungs:  Equal lung expansion, clear on auscultation    CVS:  Normal rate, regular rhythm; no JVD   Skin:  Warm to touch; no obvious rashes   Neuro:  No focal deficits ; no obvious tremor    Psych:  Normal affect,  normal countenance;          Assessment:       ICD-10-CM ICD-9-CM    1. HANDY (obstructive sleep apnea) G47.33 327.23 POLYSOMNOGRAPHY 1 NIGHT   2. Essential hypertension I10 401.9    3.  BMI 27.0-27.9,adult Z68.27 V85.23    4. H/O CHF Z86.79 V12.59          Plan:     * The patient currently has a Moderate Risk for having sleep apnea. STOP-BANG score 5.  * Sleep testing was ordered for initial evaluation. * He was provided information on sleep apnea including coresponding risk factors and the importance of proper treatment. * Treatment options if indicated were reviewed today. Patient agrees to a trial of PAP therapy if indicated. * Counseling was provided regarding proper sleep hygiene (including effect of light on sleep), sleep environment safety and safe driving. * Patient agrees to telephone [phone]  follow-up by myself or lead sleep technologist shortly after sleep study to review results and plan final management.     (patient has given permission for a message to be left regarding test results and further management if patient cannot be cannot be reached directly). Thank you for allowing us to participate in your patient's medical care. We'll keep you updated on these investigations. Freeman Wang MD, FAASM  Electronically signed.  10/25/18

## 2018-10-26 LAB
ANA TITR SER IF: NEGATIVE {TITER}
ERYTHROCYTE [DISTWIDTH] IN BLOOD BY AUTOMATED COUNT: 15.1 % (ref 12.3–15.4)
FT4I SERPL CALC-MCNC: 5.8 (ref 1.2–4.9)
HCT VFR BLD AUTO: 39.9 % (ref 37.5–51)
HGB BLD-MCNC: 13 G/DL (ref 13–17.7)
IRON SATN MFR SERPL: 33 % (ref 15–55)
IRON SERPL-MCNC: 87 UG/DL (ref 38–169)
KAPPA LC FREE SER-MCNC: 150 MG/L (ref 3.3–19.4)
KAPPA LC FREE/LAMBDA FREE SER: 0.85 {RATIO} (ref 0.26–1.65)
LAMBDA LC FREE SERPL-MCNC: 176.4 MG/L (ref 5.7–26.3)
MCH RBC QN AUTO: 34.2 PG (ref 26.6–33)
MCHC RBC AUTO-ENTMCNC: 32.6 G/DL (ref 31.5–35.7)
MCV RBC AUTO: 105 FL (ref 79–97)
PLATELET # BLD AUTO: 389 X10E3/UL (ref 150–379)
PLEASE NOTE, 734348: NORMAL
RBC # BLD AUTO: 3.8 X10E6/UL (ref 4.14–5.8)
T3RU NFR SERPL: 42 % (ref 24–39)
T4 SERPL-MCNC: 13.8 UG/DL (ref 4.5–12)
TIBC SERPL-MCNC: 262 UG/DL (ref 250–450)
TSH SERPL DL<=0.005 MIU/L-ACNC: 2.19 UIU/ML (ref 0.45–4.5)
UIBC SERPL-MCNC: 175 UG/DL (ref 111–343)
WBC # BLD AUTO: 6.9 X10E3/UL (ref 3.4–10.8)

## 2018-10-31 DIAGNOSIS — I10 ESSENTIAL HYPERTENSION: ICD-10-CM

## 2018-10-31 DIAGNOSIS — R06.02 SHORTNESS OF BREATH: ICD-10-CM

## 2018-10-31 DIAGNOSIS — I50.43 ACUTE ON CHRONIC COMBINED SYSTOLIC AND DIASTOLIC ACC/AHA STAGE C CONGESTIVE HEART FAILURE (HCC): ICD-10-CM

## 2018-10-31 DIAGNOSIS — Z99.2 CKD (CHRONIC KIDNEY DISEASE) STAGE V REQUIRING CHRONIC DIALYSIS (HCC): ICD-10-CM

## 2018-10-31 DIAGNOSIS — N18.6 CKD (CHRONIC KIDNEY DISEASE) STAGE V REQUIRING CHRONIC DIALYSIS (HCC): ICD-10-CM

## 2018-11-06 ENCOUNTER — OFFICE VISIT (OUTPATIENT)
Dept: INTERNAL MEDICINE CLINIC | Age: 63
End: 2018-11-06

## 2018-11-06 VITALS
SYSTOLIC BLOOD PRESSURE: 118 MMHG | BODY MASS INDEX: 27.23 KG/M2 | TEMPERATURE: 98.2 F | DIASTOLIC BLOOD PRESSURE: 60 MMHG | RESPIRATION RATE: 16 BRPM | HEART RATE: 104 BPM | HEIGHT: 68 IN | WEIGHT: 179.7 LBS | OXYGEN SATURATION: 94 %

## 2018-11-06 DIAGNOSIS — N18.6 STAGE 5 CHRONIC KIDNEY DISEASE ON CHRONIC DIALYSIS (HCC): ICD-10-CM

## 2018-11-06 DIAGNOSIS — E05.90 HYPERTHYROIDISM: ICD-10-CM

## 2018-11-06 DIAGNOSIS — N18.6 CKD (CHRONIC KIDNEY DISEASE) STAGE V REQUIRING CHRONIC DIALYSIS (HCC): ICD-10-CM

## 2018-11-06 DIAGNOSIS — G47.33 OBSTRUCTIVE SLEEP APNEA SYNDROME: ICD-10-CM

## 2018-11-06 DIAGNOSIS — Z99.2 CKD (CHRONIC KIDNEY DISEASE) STAGE V REQUIRING CHRONIC DIALYSIS (HCC): ICD-10-CM

## 2018-11-06 DIAGNOSIS — I10 ESSENTIAL HYPERTENSION: Primary | ICD-10-CM

## 2018-11-06 DIAGNOSIS — Z99.2 STAGE 5 CHRONIC KIDNEY DISEASE ON CHRONIC DIALYSIS (HCC): ICD-10-CM

## 2018-11-06 DIAGNOSIS — E78.00 HYPERCHOLESTEREMIA: ICD-10-CM

## 2018-11-06 DIAGNOSIS — J45.20 MILD INTERMITTENT ASTHMA WITHOUT COMPLICATION: ICD-10-CM

## 2018-11-06 NOTE — PATIENT INSTRUCTIONS
[facility] Activation    Thank you for requesting access to [facility]. Please follow the instructions below to securely access and download your online medical record. [facility] allows you to send messages to your doctor, view your test results, renew your prescriptions, schedule appointments, and more. How Do I Sign Up? 1. In your internet browser, go to [email or web address]  2. Click on the First Time User? Click Here link in the Sign In box. You will be redirect to the New Member Sign Up page. 3. Enter your [employer] Access Code exactly as it appears below. You will not need to use this code after youve completed the sign-up process. If you do not sign up before the expiration date, you must request a new code. [facility] Access Code: [email or web address]  Expires: 2019 12:27 PM (This is the date your [employer] access code will )    4. Enter the last four digits of your Social Security Number (xxxx) and Date of Birth (mm/dd/yyyy) as indicated and click Submit. You will be taken to the next sign-up page. 5. Create a [facility] ID. This will be your [facility] login ID and cannot be changed, so think of one that is secure and easy to remember. 6. Create a [facility] password. You can change your password at any time. 7. Enter your Password Reset Question and Answer. This can be used at a later time if you forget your password. 8. Enter your e-mail address. You will receive e-mail notification when new information is available in [address]. 9. Click Sign Up. You can now view and download portions of your medical record. 10. Click the Download Summary menu link to download a portable copy of your medical information. Additional Information    If you have questions, please visit the Frequently Asked Questions section of the [employer] website at [email or web address]). [employer]. [email or web address] Remember, [employer] is NOT to be used for urgent needs. For medical emergencies, dial 911.

## 2018-11-06 NOTE — PROGRESS NOTES
Chief Complaint   Patient presents with    Hypertension     f/u    Lethargy     f/u    Annual Wellness Visit     [address]      Chan Youngblood is a 70 y.o. male and presents with Hypertension (f/u); Lethargy (f/u); and Annual Wellness Visit ([address] due)  . Subjective:  Hypertension Review:  The patient has essential hypertension  Diet and Lifestyle: generally follows a  low sodium diet, exercises sporadically  Home BP Monitoring: is not measured at home. Pertinent ROS: taking medications as instructed, no medication side effects noted, no TIA's, no chest pain on exertion, no dyspnea on exertion, no swelling of ankles. Chronic Renal Disease Review:  HPI: Chan Youngblood, ([id number]) is a 70 y.o. male who returns for follow up of   chronic renal disease caused by unknown etiology. te patient  is followed by renal  The patient has experienced the following symptoms: no problems   The patient has not experienced any of the following symptoms: no problems   Prescribed diet is JORDY   The following actions have been prescribed for slowing the   progression of CRF: cardiovascular risk factor reduction including male gender, hypertension   The patientis  on dialysis    His last thyroid tests were elevated. Asthma Review:  The patient is being seen for follow up of asthma,  currently stable. Asthma symptoms occur: infrequently. Wheezing when present is described as mild and easily relieved with rescue bronchodilator. The patient reports use of a steroid inhaler. Frequency of use of quick-relief meds: rarely. Regimen compliance: The patient reports adherence to this regimen. Chan Youngblood is a 70 y.o. male and presents for annual Medicare Wellness Visit. Problem List: Reviewed with patient and discussed risk factors.     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    CKD (chronic kidney disease) stage V requiring chronic dialysis (HCC) N18.6, Z99.2       Current medical providers:  Patient Care Team:  Jamshid Decker MD as PCP - General (Internal Medicine)  Bethel Marc RN as Nurse Navigator  Fawn Sozua MD (General Surgery)  Isha Rivero NP as Nurse Practitioner (General Surgery)  Simón Conway MD as Surgeon (General Surgery)    [facility]: Reviewed with patient  Past Surgical History:   Procedure Laterality Date    CARDIAC SURG PROCEDURE UNLIST  4/29/15    CARDIAC CATHGERIZATION    HX GI      COLONOSCOPY    HX HEENT      TONSILLECTOMY    HX HERNIA REPAIR  3/8/16    Repair of right inguinal hernia with mesh,Repair of ventral incisional hernia with mesh    HX ORTHOPAEDIC Right 10/13/15     THR    HX ORTHOPAEDIC      left hip    HX OTHER SURGICAL  10/11/2016    revision of AV fistula left arm with repair of pseudoaneurysm    HX SMALL BOWEL RESECTION      HX UROLOGICAL Bilateral 2005    REMOVAL OF KIDNEYS    HX VASCULAR ACCESS      LT ARM WITH PORTS FOR DYALISIS        SH: Reviewed with patient  Social History     Tobacco Use    Smoking status: Never Smoker    Smokeless tobacco: Never Used   Substance Use Topics    Alcohol use: No    Drug use: No       FH: Reviewed with patient  Family History   Problem Relation Age of Onset    Cancer Mother         BREAST    Cancer Sister         THROAT    Stroke Brother     Anesth Problems Neg Hx        Medications/Allergies: Reviewed with patient  Current Outpatient Medications on File Prior to Visit   Medication Sig Dispense Refill    cyclobenzaprine (FLEXERIL) 10 mg tablet TAKE ONE TABLET BY MOUTH THREE TIMES DAILY AS NEEDED FOR MUSCLE SPASM 90 Tab 0  carvedilol (COREG) 6.25 mg tablet Take 12.5 mg by mouth every other day. 60 Tab 12    losartan (COZAAR) 25 mg tablet Take  by mouth every other day.  calcium acetate (PHOSLO) 667 mg cap Take 3 Caps by mouth three (3) times daily (with meals).  aspirin delayed-release 81 mg tablet Take 81 mg by mouth daily.  OTHER,NON-FORMULARY, RENAL VITAMIN WITH VIT D PLUS      bisacodyl (DULCOLAX, BISACODYL,) 5 mg EC tablet Take 5 mg by mouth daily as needed for Constipation.  sildenafil citrate (VIAGRA) 100 mg tablet Take 1 Tab by mouth as needed. 8 Tab 12    predniSONE (DELTASONE) 10 mg tablet 6 tabs today and reduce by 1 tab daily 21 Tab 3    acetaminophen (TYLENOL) 325 mg tablet Take 2 Tabs by mouth every six (6) hours. Indications: PAIN 60 Tab 0    albuterol (PROVENTIL HFA, VENTOLIN HFA, PROAIR HFA) 90 mcg/actuation inhaler Take 2 Puffs by inhalation every four (4) hours as needed for Wheezing. 1 Inhaler 12     No current facility-administered medications on file prior to visit. Allergies   Allergen Reactions    Other Food Anaphylaxis     Shell fish \"LUNGS, & THROAT CLOSES UP\"  MILK & CHEESE    Milk Anaphylaxis    Shellfish Derived Anaphylaxis    Iodinated Contrast- Oral And Iv Dye Other (comments)    Peanut Shortness of Breath    Penicillins Unknown (comments)       Objective:  Visit Vitals  /60 (BP 1 Location: Right arm, BP Patient Position: Sitting)   Pulse (!) 104   Temp 98.2 °F (36.8 °C) (Oral)   Resp 16   Ht 5' 8\" (1.727 m)   Wt 179 lb 11.2 oz (81.5 kg)   SpO2 94%   BMI 27.32 kg/m²    Body mass index is 27.32 kg/m².     Assessment of cognitive impairment: Alert and oriented x 3    Depression Screen:   PHQ over the last two weeks 10/15/2018   PHQ Not Done -   Little interest or pleasure in doing things Not at all   Feeling down, depressed, irritable, or hopeless Not at all   Total Score PHQ 2 0       Fall Risk Assessment:    Fall Risk Assessment, last 12 mths 10/23/2018 Able to walk? Yes   Fall in past 12 months? No       Functional Ability:   Does the patient exhibit a steady gait? yes   How long did it take the patient to get up and walk from a sitting position? seconds   Is the patient self reliant?  (ie can do own laundry, meals, household chores)  yes     Does the patient handle his/her own medications? yes     Does the patient handle his/her own money? yes     Is the patients home safe (ie good lighting, handrails on stairs and bath, etc.)? yes     Did you notice or did patient express any hearing difficulties? no     Did you notice or did patient express any vision difficulties?   no     Were distance and reading eye charts used? yes       Advance Care Planning:   Patient was offered the opportunity to discuss advance care planning:  yes     Does patient have an Advance Directive:  yes   If no, did you provide information on Caring Connections? yes       Plan:      Orders Placed This Encounter    NM THYROID SCAN W OR [address] Maintenance   Topic Date Due    DTaP/Tdap/Td series (1 - Tdap) 07/28/1974    Shingrix Vaccine Age 50> (1 of 2) 07/28/2003    FOBT Q 1 YEAR AGE 50-75  09/18/2015    MEDICARE YEARLY EXAM  05/26/2018    GLAUCOMA SCREENING Q2Y  07/28/2018    Pneumococcal 65+ High/Highest Risk (1 of 2 - PCV13) 07/28/2018    Hepatitis C Screening  Completed    Influenza Age 5 to Adult  Completed       *Patient verbalized understanding and agreement with the plan. A copy of the After Visit Summary with personalized health plan was given to the patient today. Review of Systems  Constitutional: negative for fevers, chills, anorexia and weight loss  Eyes:   negative for visual disturbance and irritation  ENT:   negative for tinnitus,sore throat,nasal congestion,ear pains. hoarseness  Respiratory:  negative for cough, hemoptysis, dyspnea,wheezing  CV:   negative for chest pain, palpitations, lower extremity edema  GI:   negative for nausea, vomiting, diarrhea, abdominal pain,melena  Endo:               negative for polyuria,polydipsia,polyphagia,heat intolerance  Genitourinary: negative for frequency, dysuria and hematuria  Integument:  negative for rash and pruritus  Hematologic:  negative for easy bruising and gum/nose bleeding  Musculoskel: negative for myalgias, arthralgias, back pain, muscle weakness, joint pain  Neurological:  negative for headaches, dizziness, vertigo, memory problems and gait   Behavl/Psych: negative for feelings of anxiety, depression, mood changes    Past Medical History:   Diagnosis Date    Adverse effect of anesthesia     \"MY BLOOD PRESSURE DROPS ,\"    Alzheimer's disease 2/18/2011    PT DENIES    Chronic kidney disease     KIDNEYS FAILED R/T HTN    Chronic pain     LEFT HIP PAIN    Heart failure ([facility] [address] 6/2015    CHF    Hemodialysis patient ([facility] [address]     M-W-F    Osteoarthrosis, unspecified whether generalized or localized, unspecified site 2/18/2011    Other unknown and unspecified cause of morbidity or mortality     PT DOSES OFF FREQUENTLY    PUD (peptic ulcer disease)     Renal Disease 2/18/2011    Right inguinal hernia 3/1/2016    Unspecified asthma(493.90) 2/18/2011    Unspecified essential hypertension 2/18/2011    Ventral incisional hernia 3/1/2016     Past Surgical History:   Procedure Laterality Date    CARDIAC SURG PROCEDURE UNLIST  4/29/15    CARDIAC CATHGERIZATION    HX GI      COLONOSCOPY    HX HEENT      TONSILLECTOMY    HX HERNIA REPAIR  3/8/16    Repair of right inguinal hernia with mesh,Repair of ventral incisional hernia with mesh    HX ORTHOPAEDIC Right 10/13/15     THR    HX ORTHOPAEDIC      left hip    HX OTHER SURGICAL  10/11/2016    revision of AV fistula left arm with repair of pseudoaneurysm    HX SMALL BOWEL RESECTION      HX UROLOGICAL Bilateral 2005    REMOVAL OF KIDNEYS    HX VASCULAR ACCESS      LT ARM WITH PORTS FOR DYALISIS     Social History     Socioeconomic History    Marital status:      Spouse name: Not on file    Number of children: Not on file    Years of education: Not on file    Highest education level: Not on file   Social Needs    Financial resource strain: Not on file    Food insecurity - worry: Not on file    Food insecurity - inability: Not on file    Transportation needs - medical: Not on file   [employer] needs - non-medical: Not on file   Occupational History    Not on file   Tobacco Use    Smoking status: Never Smoker    Smokeless tobacco: Never Used   Substance and Sexual Activity    Alcohol use: No    Drug use: No    Sexual activity: Not on file   Other Topics Concern    Not on file   Social History Narrative    Not on file     Family History   Problem Relation Age of Onset    Cancer Mother         BREAST    Cancer Sister         THROAT    Stroke Brother     Anesth Problems Neg Hx      Current Outpatient Medications   Medication Sig Dispense Refill    cyclobenzaprine (FLEXERIL) 10 mg tablet TAKE ONE TABLET BY MOUTH THREE TIMES DAILY AS NEEDED FOR MUSCLE SPASM 90 Tab 0    carvedilol (COREG) 6.25 mg tablet Take 12.5 mg by mouth every other day. 60 Tab 12    losartan (COZAAR) 25 mg tablet Take  by mouth every other day.  calcium acetate (PHOSLO) 667 mg cap Take 3 Caps by mouth three (3) times daily (with meals).  aspirin delayed-release 81 mg tablet Take 81 mg by mouth daily.  OTHER,NON-FORMULARY, RENAL VITAMIN WITH VIT D PLUS      bisacodyl (DULCOLAX, BISACODYL,) 5 mg EC tablet Take 5 mg by mouth daily as needed for Constipation.  sildenafil citrate (VIAGRA) 100 mg tablet Take 1 Tab by mouth as needed. 8 Tab 12    predniSONE (DELTASONE) 10 mg tablet 6 tabs today and reduce by 1 tab daily 21 Tab 3    acetaminophen (TYLENOL) 325 mg tablet Take 2 Tabs by mouth every six (6) hours.  Indications: PAIN 60 Tab 0    albuterol (PROVENTIL HFA, VENTOLIN HFA, PROAIR HFA) 90 mcg/actuation inhaler Take 2 Puffs by inhalation every four (4) hours as needed for Wheezing. 1 Inhaler 12     Allergies   Allergen Reactions    Other Food Anaphylaxis     Shell fish \"LUNGS, & THROAT CLOSES UP\"  MILK & CHEESE    Milk Anaphylaxis    Shellfish Derived Anaphylaxis    Iodinated Contrast- Oral And Iv Dye Other (comments)    Peanut Shortness of Breath    Penicillins Unknown (comments)       Objective:  Visit Vitals  /60 (BP 1 Location: Right arm, BP Patient Position: Sitting)   Pulse (!) 104   Temp 98.2 °F (36.8 °C) (Oral)   Resp 16   Ht 5' 8\" (1.727 m)   Wt 179 lb 11.2 oz (81.5 kg)   SpO2 94%   BMI 27.32 kg/m²     Physical Exam:   General appearance - alert, well appearing, and in no distress  Mental status - alert, oriented to person, place, and time  EYE-ILIANA, EOMI, corneas normal, no foreign bodies  ENT-ENT exam normal, no neck nodes or sinus tenderness  Nose - normal and patent, no erythema, discharge or polyps  Mouth - mucous membranes moist, pharynx normal without lesions  Neck - supple, no significant adenopathy   Chest - clear to auscultation, no wheezes, rales or rhonchi, symmetric air entry   Heart - normal rate, regular rhythm, normal S1, S2, no murmurs, rubs, clicks or gallops   Abdomen - soft, nontender, nondistended, no masses or organomegaly  Lymph- no adenopathy palpable  Ext-peripheral pulses normal, no pedal edema, no clubbing or cyanosis  Skin-Warm and dry.  no hyperpigmentation, vitiligo, or suspicious lesions  Neuro -alert, oriented, normal speech, no focal findings or movement disorder noted  Neck-normal C-spine, no tenderness, full ROM without pain  Feet-no nail deformities or callus formation with good pulses noted      Results for orders placed or performed in visit on 10/23/18   CBC W/O DIFF   Result Value Ref Range    WBC 6.9 3.4 - 10.8 x10E3/uL    RBC 3.80 (L) 4.14 - 5.80 x10E6/uL    HGB 13.0 13.0 - 17.7 g/dL    HCT 39.9 37.5 - 51.0 %     (H) 79 - 97 fL    MCH 34.2 (H) 26.6 - 33.0 pg    MCHC 32.6 31.5 - 35.7 g/dL    RDW 15.1 12.3 - 15.4 %    PLATELET 431 (H) 306 - 379 x10E3/uL   IRON PROFILE   Result Value Ref Range    TIBC 262 250 - 450 ug/dL    UIBC 175 111 - 343 ug/dL    Iron 87 38 - 169 ug/dL    Iron % saturation 33 15 - 55 %   THYROID PANEL W/TSH   Result Value Ref Range    TSH 2.190 0.450 - 4.500 uIU/mL    T4, Total 13.8 (H) 4.5 - 12.0 ug/dL    T3 Uptake 42 (H) 24 - 39 %    Free Thyroxine Index (FTI) 5.8 (H) 1.2 - 4.9   HALIE PANEL   Result Value Ref Range    Antinuclear Abs, IFA Negative     Please note Comment    FREE LIGHT CHAINS, KAPPA/LAMBDA, QT   Result Value Ref Range    Free Kappa Lt Chains, serum 150.0 (H) 3.3 - 19.4 mg/L    Free Lambda Lt Chains, serum 176.4 (H) 5.7 - 26.3 mg/L    Kappa/Lambda ratio, serum 0.85 0.26 - 1.65   AMB POC LIPID PROFILE   Result Value Ref Range    Cholesterol (POC) 242     Triglycerides (POC) 177     HDL Cholesterol (POC) 45     Non-HDL Cholesterol 197     LDL Cholesterol (POC) 162 MG/DL    TChol/HDL Ratio (POC) 5.4        Assessment/Plan:    ICD-10-CM ICD-9-CM    1. Essential hypertension I10 401.9    2. Stage 5 chronic kidney disease on chronic dialysis (HCC) N18.6 585.6     Z99.2 V45.11    3. Obstructive sleep apnea syndrome G47.33 327.23    4. CKD (chronic kidney disease) stage V requiring chronic dialysis (HCC) N18.6 585.6     Z99.2 V45.11    5. Mild intermittent asthma without complication P48.74 310.37    6. Hypercholesteremia E78.00 272.0    7. Hyperthyroidism E05.90 242.90 NM THYROID SCAN W OR WO FLOW     Orders Placed This Encounter    NM THYROID SCAN W OR WO FLOW     Standing Status:   Future     Standing Expiration Date:   12/6/2019     Order Specific Question:   Reason for Exam     Answer:   elevated thyroid test     lose weight, follow low fat diet, follow low salt diet, continue present plan,Take 81mg aspirin daily  Patient Instructions   [employer] Activation    Thank you for requesting access to [employer].  Please follow the instructions below to securely access and download your online medical record. [email or web address] allows you to send messages to your doctor, view your test results, renew your prescriptions, schedule appointments, and more. How Do I Sign Up? 1. In your internet browser, go to [email or web address]  2. Click on the First Time User? Click Here link in the Sign In box. You will be redirect to the New Member Sign Up page. 3. Enter your [email or web address] Access Code exactly as it appears below. You will not need to use this code after youve completed the sign-up process. If you do not sign up before the expiration date, you must request a new code. [email or web address] Access Code: [email or web address]  Expires: 2019 12:27 PM (This is the date your [email or web address] access code will )    4. Enter the last four digits of your Social Security Number (xxxx) and Date of Birth (mm/dd/yyyy) as indicated and click Submit. You will be taken to the next sign-up page. 5. Create a [email or web address] ID. This will be your [email or web address] login ID and cannot be changed, so think of one that is secure and easy to remember. 6. Create a [email or web address] password. You can change your password at any time. 7. Enter your Password Reset Question and Answer. This can be used at a later time if you forget your password. 8. Enter your e-mail address. You will receive e-mail notification when new information is available in [address] 9. Click Sign Up. You can now view and download portions of your medical record. 10. Click the Download Summary menu link to download a portable copy of your medical information. Additional Information    If you have questions, please visit the Frequently Asked Questions section of the [email or web address] website at [email or web address] Remember, [email or web address] is NOT to be used for urgent needs. For medical emergencies, dial 911. Follow-up Disposition:  Return in about 4 weeks (around 2018), or if symptoms worsen or fail to improve.       I have reviewed with the patient details of the assessment and plan and all questions were answered. Relevent patient education was performed. The most recent lab findings were reviewed with the patient. An After Visit Summary was printed and given to the patient. 1. Have you been to the ER, urgent care clinic since your last visit? Hospitalized since your last visit? No    2. Have you seen or consulted any other health care providers outside of the [address] [place] [address] since your last visit? Include any pap smears or colon screening. No    Pt would like to f/u on blood work from last visit.

## 2018-11-13 ENCOUNTER — OFFICE VISIT (OUTPATIENT)
Dept: CARDIOLOGY CLINIC | Age: 63
End: 2018-11-13

## 2018-11-13 VITALS
BODY MASS INDEX: 27.25 KG/M2 | TEMPERATURE: 98.2 F | DIASTOLIC BLOOD PRESSURE: 76 MMHG | OXYGEN SATURATION: 98 % | SYSTOLIC BLOOD PRESSURE: 112 MMHG | WEIGHT: 179.8 LBS | RESPIRATION RATE: 20 BRPM | HEART RATE: 102 BPM | HEIGHT: 68 IN

## 2018-11-13 DIAGNOSIS — I50.43 ACUTE ON CHRONIC COMBINED SYSTOLIC AND DIASTOLIC ACC/AHA STAGE C CONGESTIVE HEART FAILURE (HCC): ICD-10-CM

## 2018-11-13 DIAGNOSIS — Z99.2 CKD (CHRONIC KIDNEY DISEASE) STAGE V REQUIRING CHRONIC DIALYSIS (HCC): Primary | ICD-10-CM

## 2018-11-13 DIAGNOSIS — R06.02 SHORTNESS OF BREATH: ICD-10-CM

## 2018-11-13 DIAGNOSIS — I27.20 PULMONARY HYPERTENSION (HCC): ICD-10-CM

## 2018-11-13 DIAGNOSIS — N18.6 CKD (CHRONIC KIDNEY DISEASE) STAGE V REQUIRING CHRONIC DIALYSIS (HCC): Primary | ICD-10-CM

## 2018-11-13 NOTE — PROGRESS NOTES
[address] [address] Note    Patient name: Ambrocio Medellin  Patient : 1955  Patient MRN: [id number]  Date of service: 18    Primary care physician: Charley Cerda MD  Primary cardiologist: Dr. Redia Dakin, MD     Primary nephrologist: Prateek Salcido MD    CHIEF COMPLAINT:  Chronic systolic heart failure    PLAN:  Heart failure with preserved EF appears compensated on adequate medical therapy  Continue current medical therapy for heart failure  Continue current dose of coreg and cozaar (hold on dialysis days due to hypotension)  Volume management per dialysis; check orthostatics  Reinforced low sal diet  Reinforced fluid restriction to 6 x 8oz glasses per day    Functional capacity remains marginal, NYHA class IIB; check 6MW today to quantify  High risk surgical candidate for renal transplant given severity of PAH with severe TR; needs to complete workup  Would complete non-invasive workup for pulmonary hypertension (VQ scan, sleep study, PFT w/DLCO) and then follow with HRCT +/- invasive testings: RHC +/- nitric challenge +/- exercise +/- biopsy); may need referral to [address] at [facility]. Not candidate for cardiac MRI due to dialysis; consider technetium cardiac/bone scan at [facility] next visit.   Referral to hematology (macrocytic anemia, high concentration of free light chains, low threshold for invasive workup)    Labs today: ferritin, HALIE, HIT profile, TOMI, sed rate, aTTR, folate and vitamin B12  Follow-up with primary cardiologist  Follow-up with PCP for flu vaccine annually and pneumonia vaccine every 5 years  Return to [facility] in one month      IMPRESSION:  Fatigue  Chronic systolic heart failure  Stage C, NYHA class IIIB symptoms  Heart failure with preserved ejection fraction  Mild LV dysfunction, LVEF 40-45%  Severe pulmonary hypertension by echo  Severe tricuspid regurgitation  Non-restorative sleep, possible HANDY     CARDIAC IMAGING:  Echo (9/6/18) LVEF 40-45%, LVEDD 5.1cm, IVS 1.3cm, , RV size and function normal, RV pressure 91mmHg, severe TR, IVC dilated with partial collapse with inspiratio. EKG (4/6/18) NSR, LVH  LHC (4/6/18)  normal coronary arteries, LV EF 30-35%, moderately elevated LV filling pressure  NST (2/21/18) abnormal with an inferior wall defect with a moderate amount of reperfusion, LVEF 38%     HEMODYNAMICS:  RHC not done  CPEST not done  6MW not done     HISTORY OF PRESENT ILLNESS:  I had the pleasure of seeing Isaac Juarez in [facility] at [facility] in [address] [place] [address], Isaac Juarez is a 70 y.o. male with h/o HTN, ESRD on HD. Patient was initially referred to Dr. Lynn Gale for exertional chest pain while exercising at the gym; he also was interested in cardiac clearance for renal transplantation. Patient had a myocardial perfusion imaging study on 02/21/2018, which was abnormal with an inferior wall defect with a moderate amount of reperfusion with an ejection fraction of 38%. He previously had coronary angiography in 2014 and had nonocclusive disease at that time[facility] and showed normal coronaries, LVEF 35% and elevated filling pressures at weight 169lbs. He was started on medical therap for heart failure and echocardiogram was repeated in September 2018. It showed mild LV dysfunction LVEF 40-45%, severe pulmonary hypertension RVSP 91mmHg and severe TR with normal RV size and function.        INTERVAL HISTORY:  Today, patient presents for routine clinic visit.     Patient is doing fair. Patient walked to our clinic from parking garage very slowly with a cane, has not stopped though. He walk very slowly one mile twice a week. Patient could not use stairs. Patient can perform home activities slowly. Patient denies symptoms of volume overload, abdominal bloating or leg edema.  Patient's weight remains stable, he denies weight gain or weight loss, or change of appetite. Patient denies irregular heart rate or palpitations. Patient denies other cardiac symptoms such as chest pain or leg pain with walking. Patient is compliant with fluid restriction and taking medications as prescribed. Patient manages medications himself. REVIEW OF SYSTEMS:  General: Denies fever, night sweats. Ear, nose and throat: Denies difficulty hearing, sinus problems, runny nose, post-nasal drip, ringing in ears, mouth sores, loose teeth, ear pain, nosebleeds, sore throate, facial pain or numbess  Cardiovascular: see above in the interval history  Respiratory: Denies cough, wheezing, sputum production, hemoptysis. Gastrointestinal: Denies heartburn, constipation, intolerance to certain foods, diarrhea, abdominal pain, nausea, vomiting, difficulty swallowing, blood in stool  Kidney and bladder: Denies painful urination, frequent urination, urgency, prostate problems and impotence  Musculoskeletal: Denies joint pain, muscle weakness  Skin and hair: Denies change in existing skin lesions, hair loss or increase, breast changes    PHYSICAL EXAM:  Vital signs:   Visit Vitals  /76 (BP 1 Location: Right arm, BP Patient Position: Sitting)   Pulse (!) 102   Temp 98.2 °F (36.8 °C) (Oral)   Resp 20   Ht 5' 8\" (1.727 m)   Wt 179 lb 12.8 oz (81.6 kg)   SpO2 98%   BMI 27.34 kg/m²     General: Patient is well developed, well-nourished in no acute distress  HEENT: Normocephalic and atraumatic. No scleral icterus. Pupils are equal, round and reactive to light and accomodation. No conjunctival injection. Oropharynx is clear. Neck: Supple. No evidence of thyroid enlargements or lymphadenopathy. JVD: Cannot be appreciated   Lungs: Breath sounds are equal and clear bilaterally. No wheezes, rhonchi, or rales. Heart: Regular rate and rhythm with normal S1 and S2. No murmurs, gallops or rubs. Abdomen: Soft, no mass or tenderness.  No organomegaly or hernia. Bowel sounds present. Genitourinary and rectal: deferred  Extremities: No cyanosis, clubbing, or edema. Neurologic: No focal sensory or motor deficits are noted. Grossly intact. Psychiatric: Awake, alert an doriented x 3. Appropriate mood and affect. Skin: Warm, dry and well perfused. No lesions, nodules or rashes are noted.     PAST MEDICAL HISTORY:  Past Medical History:   Diagnosis Date    Adverse effect of anesthesia     \"MY BLOOD PRESSURE DROPS ,\"    Alzheimer's disease 2/18/2011    PT DENIES    Chronic kidney disease     KIDNEYS FAILED R/T HTN    Chronic pain     LEFT HIP PAIN    Heart failure [facility] [address] 6/2015    CHF    Hemodialysis patient ([facility] [address]     M-W-F    Osteoarthrosis, unspecified whether generalized or localized, unspecified site 2/18/2011    Other unknown and unspecified cause of morbidity or mortality     PT DOSES OFF FREQUENTLY    PUD (peptic ulcer disease)     Renal Disease 2/18/2011    Right inguinal hernia 3/1/2016    Unspecified asthma(493.90) 2/18/2011    Unspecified essential hypertension 2/18/2011    Ventral incisional hernia 3/1/2016       PAST SURGICAL HISTORY:  Past Surgical History:   Procedure Laterality Date    CARDIAC SURG PROCEDURE UNLIST  4/29/15    CARDIAC CATHGERIZATION    HX GI      COLONOSCOPY    HX HEENT      TONSILLECTOMY    HX HERNIA REPAIR  3/8/16    Repair of right inguinal hernia with mesh,Repair of ventral incisional hernia with mesh    HX ORTHOPAEDIC Right 10/13/15     THR    HX ORTHOPAEDIC      left hip    HX OTHER SURGICAL  10/11/2016    revision of AV fistula left arm with repair of pseudoaneurysm    HX SMALL BOWEL RESECTION      HX UROLOGICAL Bilateral 2005    REMOVAL OF KIDNEYS    HX VASCULAR ACCESS      LT ARM WITH PORTS FOR DYALISIS       FAMILY HISTORY:  Family History   Problem Relation Age of Onset    Cancer Mother         BREAST    Cancer Sister         THROAT    Stroke Brother     Anesth Problems Neg Hx        SOCIAL HISTORY:  Social History     Socioeconomic History    Marital status:      Spouse name: Not on file    Number of children: Not on file    Years of education: Not on file    Highest education level: Not on file   Social Needs    Financial resource strain: Not on file    Food insecurity - worry: Not on file    Food insecurity - inability: Not on file    Transportation needs - medical: Not on file   [employer] needs - non-medical: Not on file   Occupational History    Not on file   Tobacco Use    Smoking status: Never Smoker    Smokeless tobacco: Never Used   Substance and Sexual Activity    Alcohol use: No    Drug use: No    Sexual activity: Not on file   Other Topics Concern    Not on file   Social History Narrative    Not on file       LABORATORY RESULTS:     Labs Latest Ref Rng & Units 10/24/2018   WBC 3.4 - 10.8 x10E3/uL 6.9   RBC 4.14 - 5.80 x10E6/uL 3.80(L)   Hemoglobin 13.0 - 17.7 g/dL 13.0   Hematocrit 37.5 - 51.0 % 39.9   MCV 79 - 97 fL 105(H)   Platelets 894 - 204 R13X6/(H)   TSH 0.450 - 4.500 uIU/mL 2.190   Some recent data might be hidden     Lab Results   Component Value Date/Time    TSH 2.190 10/24/2018 11:26 AM    TSH 3.23 11/04/2015 11:29 AM       CURRENT MEDICATIONS:    Current Outpatient Medications:     bisacodyl (DULCOLAX, BISACODYL,) 5 mg EC tablet, Take 5 mg by mouth daily as needed for Constipation. , Disp: , Rfl:     cyclobenzaprine (FLEXERIL) 10 mg tablet, TAKE ONE TABLET BY MOUTH THREE TIMES DAILY AS NEEDED FOR MUSCLE SPASM, Disp: 90 Tab, Rfl: 0    carvedilol (COREG) 6.25 mg tablet, Take 12.5 mg by mouth every other day., Disp: 60 Tab, Rfl: 12    sildenafil citrate (VIAGRA) 100 mg tablet, Take 1 Tab by mouth as needed. , Disp: 8 Tab, Rfl: 12    predniSONE (DELTASONE) 10 mg tablet, 6 tabs today and reduce by 1 tab daily, Disp: 21 Tab, Rfl: 3    losartan (COZAAR) 25 mg tablet, Take  by mouth every other day., Disp: , Rfl:     calcium acetate (PHOSLO) 667 mg cap, Take 3 Caps by mouth three (3) times daily (with meals). , Disp: , Rfl:     aspirin delayed-release 81 mg tablet, Take 81 mg by mouth daily. , Disp: , Rfl:     acetaminophen (TYLENOL) 325 mg tablet, Take 2 Tabs by mouth every six (6) hours. Indications: PAIN, Disp: 60 Tab, Rfl: 0    OTHER,NON-FORMULARY,, RENAL VITAMIN WITH VIT D PLUS, Disp: , Rfl:     albuterol (PROVENTIL HFA, VENTOLIN HFA, PROAIR HFA) 90 mcg/actuation inhaler, Take 2 Puffs by inhalation every four (4) hours as needed for Wheezing., Disp: 1 Inhaler, Rfl: 12      Thank you for allowing me to participate in this patient's care.     Indira Retana MD PhD  [address] [place] [address], [facility]  Phone: [phone]  Fax: [phone]

## 2018-11-14 ENCOUNTER — DOCUMENTATION ONLY (OUTPATIENT)
Dept: CARDIOLOGY CLINIC | Age: 63
End: 2018-11-14

## 2018-11-14 DIAGNOSIS — I10 ESSENTIAL HYPERTENSION: Primary | ICD-10-CM

## 2018-11-14 DIAGNOSIS — R06.02 SHORTNESS OF BREATH: ICD-10-CM

## 2018-11-14 NOTE — PROGRESS NOTES
Referral for hemologist Dr. Ignacio Lema    Appointment is scheduled on Thursday December 13th at 10am with arrival of 9:30am.    Patient will call be back to get information

## 2018-11-15 LAB
ANA TITR SER IF: NEGATIVE {TITER}
FERRITIN SERPL-MCNC: 1891 NG/ML (ref 30–400)
FOLATE SERPL-MCNC: 17.8 NG/ML
PLATELET FACTOR 4 [UNITS/VOLUME] IN PLATELET POOR PLASMA: NORMAL IU/ML
PLEASE NOTE, 734348: NORMAL
VIT B12 SERPL-MCNC: 950 PG/ML (ref 232–1245)

## 2018-11-20 ENCOUNTER — HOSPITAL ENCOUNTER (OUTPATIENT)
Dept: NUCLEAR MEDICINE | Age: 63
Discharge: HOME | End: 2018-11-20
Attending: INTERNAL MEDICINE
Payer: MEDICARE

## 2018-11-20 ENCOUNTER — HOSPITAL ENCOUNTER (OUTPATIENT)
Dept: GENERAL RADIOLOGY | Age: 63
Discharge: HOME | End: 2018-11-20
Attending: INTERNAL MEDICINE
Payer: MEDICARE

## 2018-11-20 ENCOUNTER — HOSPITAL ENCOUNTER (OUTPATIENT)
Dept: PULMONOLOGY | Age: 63
Discharge: HOME | End: 2018-11-20
Attending: INTERNAL MEDICINE
Payer: MEDICARE

## 2018-11-20 DIAGNOSIS — N18.6 CKD (CHRONIC KIDNEY DISEASE) STAGE V REQUIRING CHRONIC DIALYSIS (HCC): ICD-10-CM

## 2018-11-20 DIAGNOSIS — I50.43 ACUTE ON CHRONIC COMBINED SYSTOLIC AND DIASTOLIC ACC/AHA STAGE C CONGESTIVE HEART FAILURE (HCC): ICD-10-CM

## 2018-11-20 DIAGNOSIS — Z99.2 CKD (CHRONIC KIDNEY DISEASE) STAGE V REQUIRING CHRONIC DIALYSIS (HCC): ICD-10-CM

## 2018-11-20 DIAGNOSIS — R06.02 SHORTNESS OF BREATH: ICD-10-CM

## 2018-11-20 DIAGNOSIS — I27.20 PULMONARY HYPERTENSION (HCC): ICD-10-CM

## 2018-11-20 DIAGNOSIS — I10 ESSENTIAL HYPERTENSION: ICD-10-CM

## 2018-11-20 PROCEDURE — 71046 X-RAY EXAM CHEST 2 VIEWS: CPT

## 2018-11-20 PROCEDURE — 78579 LUNG VENTILATION IMAGING: CPT

## 2018-11-20 PROCEDURE — 94010 BREATHING CAPACITY TEST: CPT

## 2018-11-20 PROCEDURE — 94729 DIFFUSING CAPACITY: CPT

## 2018-11-21 NOTE — PROCEDURES
[facility]  PULMONARY FUNCTION    Nikki Winn.  MR#: [id number]  : 1955  ACCOUNT #: [de-identified]   DATE OF SERVICE: 2018    INDICATION:  CHF. FINDINGS:  SPIROMETRY:  Reduced FVC, reduced FEV1, normal FEV1/FVC ratio, reduced MVV. DIFFUSION CAPACITY:  Reduced diffusion capacity. IMPRESSION:  Severe restrictive ventilatory defect based on spirometry. Severely reduced diffusion capacity. Lung volume measurements may be indicated for further evaluation of restrictive lung disease. Technician noted questionable patient effort. Clinical correlation is recommended.       MD Vonnie Green / Rere.Kendra  D: 2018 15:21     T: 2018 04:40  JOB #: [id number]

## 2018-12-12 ENCOUNTER — TELEPHONE (OUTPATIENT)
Dept: CARDIOLOGY CLINIC | Age: 63
End: 2018-12-12

## 2018-12-12 ENCOUNTER — OFFICE VISIT (OUTPATIENT)
Dept: CARDIOLOGY CLINIC | Age: 63
End: 2018-12-12

## 2018-12-12 VITALS
HEART RATE: 106 BPM | OXYGEN SATURATION: 98 % | DIASTOLIC BLOOD PRESSURE: 84 MMHG | BODY MASS INDEX: 27.19 KG/M2 | WEIGHT: 179.4 LBS | RESPIRATION RATE: 20 BRPM | TEMPERATURE: 98.1 F | HEIGHT: 68 IN | SYSTOLIC BLOOD PRESSURE: 110 MMHG

## 2018-12-12 DIAGNOSIS — R06.02 SHORTNESS OF BREATH: Primary | ICD-10-CM

## 2018-12-12 DIAGNOSIS — N18.6 CKD (CHRONIC KIDNEY DISEASE) STAGE V REQUIRING CHRONIC DIALYSIS (HCC): ICD-10-CM

## 2018-12-12 DIAGNOSIS — Z99.2 CKD (CHRONIC KIDNEY DISEASE) STAGE V REQUIRING CHRONIC DIALYSIS (HCC): ICD-10-CM

## 2018-12-12 DIAGNOSIS — M16.11 PRIMARY LOCALIZED OSTEOARTHRITIS OF RIGHT HIP: ICD-10-CM

## 2018-12-12 DIAGNOSIS — I10 ESSENTIAL HYPERTENSION: ICD-10-CM

## 2018-12-12 DIAGNOSIS — N18.6 CKD (CHRONIC KIDNEY DISEASE) STAGE V REQUIRING CHRONIC DIALYSIS (HCC): Primary | ICD-10-CM

## 2018-12-12 DIAGNOSIS — Z99.2 CKD (CHRONIC KIDNEY DISEASE) STAGE V REQUIRING CHRONIC DIALYSIS (HCC): Primary | ICD-10-CM

## 2018-12-12 DIAGNOSIS — I50.43 ACUTE ON CHRONIC COMBINED SYSTOLIC AND DIASTOLIC ACC/AHA STAGE C CONGESTIVE HEART FAILURE (HCC): ICD-10-CM

## 2018-12-12 NOTE — TELEPHONE ENCOUNTER
Message sent to coordination of care to schedule CT to rule out interstitial lung disease. I also called Dr. Solis Hazard office to schedule RHC. They state Dr. Rosana Nunez will be back in office on Monday December 17 and they will then get it scheduled and then call us to let us know. Will then schedule patient for follow up in [facility] after 2 procedures.

## 2018-12-12 NOTE — PROGRESS NOTES
[facility] in [place], [address] [place] [address] Note    Patient name: Archibald Opitz  Patient : 1955  Patient MRN: [id number]  Date of service: 18    Primary care physician: Rajani Tang MD  Primary cardiologist: Dr. Ekaterina tOto MD     Primary nephrologist: Antonio Saini MD     CHIEF COMPLAINT:  Chronic systolic heart failure     PLAN:  Heart failure with preserved EF on optimal GDMT   Continue current dose of coreg and cozaar (hold on dialysis days due to hypotension)  Volume management per dialysis; check orthostatics  Reinforced low sal diet and fluid restriction to 6 x 8oz glasses per day  6MW today with pulseoxymetry  High resolution chest CT for interstitial lung disease (severe restrictive disease)  Repeat PFT with lung volumes and DLCO  Schedule sleep study  Schedule RHC with Dr. Des Terrell within 2 weeks  Not candidate for cardiac MRI due to dialysis; consider technetium bone scan  Follow-up with PCP for possible referrals to hematology (very high free lambda chains even for dialysis patient, anemia macrocytic with normal vitamin counts and high ferritin) and pulmonologist (restrictive lung disease) and endocrinologist (subclinical hyperthyroidism)  Not surgical candidate for renal transplant until workup completed for severe PAH with severe TR  Labs today: BMP, HIT profile, TOMI, sed rate, aTTR  Follow-up with primary cardiologist  Follow-up with PCP for flu vaccine annually and pneumonia vaccine every 5 years  Return to [facility] Clinic in 2-4 weeks depending how soon workup can be completed    IMPRESSION:  Fatigue  Chronic diastolic heart failure  Stage C, NYHA class IIIB/IV symptoms  Heart failure with preserved ejection fraction  Mild LV dysfunction, LVEF 40-45%  Severe pulmonary hypertension by echo  Severe tricuspid regurgitation  Non-restorative sleep, possible HANDY  High ferritin level likely due to dialysis  Severe pulmonary restriction     CARDIAC IMAGING:  Echo (9/6/18) LVEF 40-45%, LVEDD 5.1cm, IVS 1.3cm, , RV size and function normal, RV pressure 91mmHg, severe TR, IVC dilated with partial collapse with inspiratio.   EKG (4/6/18) NSR, LVH  LHC (4/6/18)  normal coronary arteries, LV EF 30-35%, moderately elevated LV filling pressure  NST (2/21/18) abnormal with an inferior wall defect with a moderate amount of reperfusion, LVEF 38%     HEMODYNAMICS:  RHC not done  CPEST not done  6MW not done    OTHER IMAGING:  PFT (11/20/18) Severe restrictive ventilatory defect; lung volume measurements were not done, only spirometry was ordered, why?     HISTORY OF PRESENT ILLNESS:  I had the pleasure of seeing Eugene KATY Ortega in [facility] at [address] [place] [address] in [address] a 70 y. o. male with h/o HTN, ESRD on HD. Patient was initially referred to Dr. Amira Shine for exertional chest pain while exercising at the gym; he also was interested in cardiac clearance for renal transplantation. Patient had a myocardial perfusion imaging study on 02/21/2018, which was abnormal with an inferior wall defect with a moderate amount of reperfusion with an ejection fraction of 38%. He previously had coronary angiography in 2014 and had nonocclusive disease at [facility] and showed normal coronaries, LVEF 35% and elevated filling pressures at weight 169lbs. He was started on medical therap for heart failure and echocardiogram was repeated in September 2018. It showed mild LV dysfunction LVEF 40-45%, severe pulmonary hypertension RVSP 91mmHg and severe TR with normal RV size and function.        INTERVAL HISTORY:  Today, patient presents for routine clinic visit. Patient is doing poorly. Patient walked to our clinic from parking garage very slowly with a cane, has not stopped though. He walk very slowly one mile twice a week. Patient could not use stairs.  Patient can perform home activities slowly. Cold air makes this much worse. He noticed chronic productive cough. Patient denies symptoms of volume overload, abdominal bloating or leg edema. He is concerned dialysis removing too much fluid and muscle cramping became very bad. Patient denies irregular heart rate or palpitations. Patient denies other cardiac symptoms such as chest pain or leg pain with walking. Patient is compliant with fluid restriction and taking medications as prescribed. Patient manages medications himself. REVIEW OF SYSTEMS:  General: Denies fever, night sweats. Ear, nose and throat: Denies difficulty hearing, sinus problems, runny nose, post-nasal drip, ringing in ears, mouth sores, loose teeth, ear pain, nosebleeds, sore throate, facial pain or numbess  Cardiovascular: see above in the interval history  Respiratory: Denies cough, wheezing, sputum production, hemoptysis. Gastrointestinal: Denies heartburn, constipation, intolerance to certain foods, diarrhea, abdominal pain, nausea, vomiting, difficulty swallowing, blood in stool  Kidney and bladder: Denies painful urination, frequent urination, urgency, prostate problems and impotence  Musculoskeletal: Denies joint pain, muscle weakness  Skin and hair: Denies change in existing skin lesions, hair loss or increase, breast changes    PHYSICAL EXAM:  Vital signs:   Visit Vitals  /84 (BP 1 Location: Right arm, BP Patient Position: Sitting)   Pulse (!) 106   Temp 98.1 °F (36.7 °C) (Oral)   Resp 20   Ht 5' 8\" (1.727 m)   Wt 179 lb 6.4 oz (81.4 kg)   SpO2 98%   BMI 27.28 kg/m²     General: Patient is well developed, well-nourished in no acute distress  HEENT: Normocephalic and atraumatic. No scleral icterus. Pupils are equal, round and reactive to light and accomodation. No conjunctival injection. Oropharynx is clear. Neck: Supple. No evidence of thyroid enlargements or lymphadenopathy.   JVD: Cannot be appreciated   Lungs: Breath sounds are equal and clear bilaterally. No wheezes, rhonchi, or rales. Heart: Regular rate and rhythm with normal S1 and S2. No murmurs, gallops or rubs. Abdomen: Soft, no mass or tenderness. No organomegaly or hernia. Bowel sounds present. Genitourinary and rectal: deferred  Extremities: No cyanosis, clubbing, or edema. Neurologic: No focal sensory or motor deficits are noted. Grossly intact. Psychiatric: Awake, alert an doriented x 3. Appropriate mood and affect. Skin: Warm, dry and well perfused. No lesions, nodules or rashes are noted.     PAST MEDICAL HISTORY:  Past Medical History:   Diagnosis Date    Adverse effect of anesthesia     \"MY BLOOD PRESSURE DROPS ,\"    Alzheimer's disease 2/18/2011    PT DENIES    Chronic kidney disease     KIDNEYS FAILED R/T HTN    Chronic pain     LEFT HIP PAIN    Heart failure ([facility] [address] 6/2015    CHF    Hemodialysis patient ([facility] [address]     M-W-F    Osteoarthrosis, unspecified whether generalized or localized, unspecified site 2/18/2011    Other unknown and unspecified cause of morbidity or mortality     PT DOSES OFF FREQUENTLY    PUD (peptic ulcer disease)     Renal Disease 2/18/2011    Right inguinal hernia 3/1/2016    Unspecified asthma(493.90) 2/18/2011    Unspecified essential hypertension 2/18/2011    Ventral incisional hernia 3/1/2016       PAST SURGICAL HISTORY:  Past Surgical History:   Procedure Laterality Date    CARDIAC SURG PROCEDURE UNLIST  4/29/15    CARDIAC CATHGERIZATION    HX GI      COLONOSCOPY    HX HEENT      TONSILLECTOMY    HX HERNIA REPAIR  3/8/16    Repair of right inguinal hernia with mesh,Repair of ventral incisional hernia with mesh    HX ORTHOPAEDIC Right 10/13/15     THR    HX ORTHOPAEDIC      left hip    HX OTHER SURGICAL  10/11/2016    revision of AV fistula left arm with repair of pseudoaneurysm    HX SMALL BOWEL RESECTION      HX UROLOGICAL Bilateral 2005    REMOVAL OF KIDNEYS    HX VASCULAR ACCESS      LT ARM WITH PORTS FOR DYALISIS FAMILY HISTORY:  Family History   Problem Relation Age of Onset    Cancer Mother         BREAST    Cancer Sister         THROAT    Stroke Brother     Anesth Problems Neg Hx        SOCIAL HISTORY:  Social History     Socioeconomic History    Marital status:      Spouse name: Not on file    Number of children: Not on file    Years of education: Not on file    Highest education level: Not on file   Tobacco Use    Smoking status: Never Smoker    Smokeless tobacco: Never Used   Substance and Sexual Activity    Alcohol use: No    Drug use: No       LABORATORY RESULTS:     Labs Latest Ref Rng & Units 10/24/2018   WBC 3.4 - 10.8 x10E3/uL 6.9   RBC 4.14 - 5.80 x10E6/uL 3.80(L)   Hemoglobin 13.0 - 17.7 g/dL 13.0   Hematocrit 37.5 - 51.0 % 39.9   MCV 79 - 97 fL 105(H)   Platelets 513 - 959 E38J1/(H)   TSH 0.450 - 4.500 uIU/mL 2.190   Some recent data might be hidden     Lab Results   Component Value Date/Time    TSH 2.190 10/24/2018 11:26 AM    TSH 3.23 11/04/2015 11:29 AM       CURRENT MEDICATIONS:    Current Outpatient Medications:     bisacodyl (DULCOLAX, BISACODYL,) 5 mg EC tablet, Take 5 mg by mouth daily as needed for Constipation. , Disp: , Rfl:     cyclobenzaprine (FLEXERIL) 10 mg tablet, TAKE ONE TABLET BY MOUTH THREE TIMES DAILY AS NEEDED FOR MUSCLE SPASM, Disp: 90 Tab, Rfl: 0    carvedilol (COREG) 6.25 mg tablet, Take 12.5 mg by mouth every other day., Disp: 60 Tab, Rfl: 12    sildenafil citrate (VIAGRA) 100 mg tablet, Take 1 Tab by mouth as needed. , Disp: 8 Tab, Rfl: 12    predniSONE (DELTASONE) 10 mg tablet, 6 tabs today and reduce by 1 tab daily, Disp: 21 Tab, Rfl: 3    losartan (COZAAR) 25 mg tablet, Take  by mouth every other day., Disp: , Rfl:     calcium acetate (PHOSLO) 667 mg cap, Take 3 Caps by mouth three (3) times daily (with meals). , Disp: , Rfl:     aspirin delayed-release 81 mg tablet, Take 81 mg by mouth daily. , Disp: , Rfl:     acetaminophen (TYLENOL) 325 mg tablet, Take 2 Tabs by mouth every six (6) hours. Indications: PAIN, Disp: 60 Tab, Rfl: 0    OTHER,NON-FORMULARY,, RENAL VITAMIN WITH VIT D PLUS, Disp: , Rfl:     albuterol (PROVENTIL HFA, VENTOLIN HFA, PROAIR HFA) 90 mcg/actuation inhaler, Take 2 Puffs by inhalation every four (4) hours as needed for Wheezing., Disp: 1 Inhaler, Rfl: 12      Thank you for your referral and allowing me to participate in this patient's care.     Guillermo Dooley MD PhD  [address] [place] [address], [address]  Phone: [phone]  Fax: [phone]

## 2018-12-13 ENCOUNTER — OFFICE VISIT (OUTPATIENT)
Dept: ONCOLOGY | Age: 63
End: 2018-12-13

## 2018-12-13 ENCOUNTER — HOSPITAL ENCOUNTER (OUTPATIENT)
Dept: LAB | Age: 63
Discharge: HOME | End: 2018-12-13
Payer: MEDICARE

## 2018-12-13 ENCOUNTER — TELEPHONE (OUTPATIENT)
Dept: CARDIOLOGY CLINIC | Age: 63
End: 2018-12-13

## 2018-12-13 VITALS
RESPIRATION RATE: 16 BRPM | TEMPERATURE: 98 F | DIASTOLIC BLOOD PRESSURE: 63 MMHG | HEIGHT: 68 IN | OXYGEN SATURATION: 94 % | BODY MASS INDEX: 27.16 KG/M2 | HEART RATE: 102 BPM | SYSTOLIC BLOOD PRESSURE: 98 MMHG | WEIGHT: 179.2 LBS

## 2018-12-13 DIAGNOSIS — D64.9 ANEMIA, UNSPECIFIED TYPE: Primary | ICD-10-CM

## 2018-12-13 DIAGNOSIS — Z99.2 CKD (CHRONIC KIDNEY DISEASE) STAGE V REQUIRING CHRONIC DIALYSIS (HCC): ICD-10-CM

## 2018-12-13 DIAGNOSIS — I10 ESSENTIAL HYPERTENSION: ICD-10-CM

## 2018-12-13 DIAGNOSIS — Z99.2 CKD (CHRONIC KIDNEY DISEASE) STAGE V REQUIRING CHRONIC DIALYSIS (HCC): Primary | ICD-10-CM

## 2018-12-13 DIAGNOSIS — R06.02 SHORTNESS OF BREATH: ICD-10-CM

## 2018-12-13 DIAGNOSIS — N18.6 CKD (CHRONIC KIDNEY DISEASE) STAGE V REQUIRING CHRONIC DIALYSIS (HCC): ICD-10-CM

## 2018-12-13 DIAGNOSIS — N18.6 CKD (CHRONIC KIDNEY DISEASE) STAGE V REQUIRING CHRONIC DIALYSIS (HCC): Primary | ICD-10-CM

## 2018-12-13 LAB
BUN SERPL-MCNC: NORMAL MG/DL
CALCIUM SERPL-MCNC: NORMAL MG/DL
CHLORIDE SERPL-SCNC: NORMAL MMOL/L
CO2 SERPL-SCNC: NORMAL MMOL/L
CREAT SERPL-MCNC: NORMAL MG/DL
ERYTHROCYTE [SEDIMENTATION RATE] IN BLOOD BY WESTERGREN METHOD: 31 MM/HR (ref 0–30)
GLUCOSE SERPL-MCNC: NORMAL MG/DL
PF4 HEPARIN CMPLX IGG SERPL IA: NORMAL OD
POTASSIUM SERPL-SCNC: NORMAL MMOL/L
SODIUM SERPL-SCNC: NORMAL MMOL/L

## 2018-12-13 PROCEDURE — 80053 COMPREHEN METABOLIC PANEL: CPT

## 2018-12-13 PROCEDURE — 81256 HFE GENE: CPT

## 2018-12-13 PROCEDURE — 85025 COMPLETE CBC W/AUTO DIFF WBC: CPT

## 2018-12-13 PROCEDURE — 82728 ASSAY OF FERRITIN: CPT

## 2018-12-13 PROCEDURE — 85045 AUTOMATED RETICULOCYTE COUNT: CPT

## 2018-12-13 PROCEDURE — 36415 COLL VENOUS BLD VENIPUNCTURE: CPT

## 2018-12-13 PROCEDURE — 84165 PROTEIN E-PHORESIS SERUM: CPT

## 2018-12-13 PROCEDURE — 85060 BLOOD SMEAR INTERPRETATION: CPT

## 2018-12-13 PROCEDURE — 83010 ASSAY OF HAPTOGLOBIN QUANT: CPT

## 2018-12-13 PROCEDURE — 83615 LACTATE (LD) (LDH) ENZYME: CPT

## 2018-12-13 NOTE — TELEPHONE ENCOUNTER
#[phone] Coordination of care called stating that an order was put in for a CT and the patient has an allergy to contrast. Coordination of Care would like a call back. Thank you.

## 2018-12-13 NOTE — PROGRESS NOTES
[facility] [place] [place] [address] [address], [address]  W: [phone]  F: [phone]    Reason for Visit:   Andres Wise is a 70 y.o. male who is seen in consultation at the request of Dr. Luis Frazier for evaluation of anemia     History of Present Illness:   Patient is a 70 y.o. male with HTN, cardiomyopathy, Pulmonary HTN ESRD on HD who is seen for evaluation of macrocytosis and abnormal Light chains    Has had chronic macrocytic anemia which has lately improved to 13 g/dl. Work up included an SPEP that showed elevated light chains so he comes for further evaluation. Iron studies previously showed no evidence of iron deficiency and infact the ferritin was elevated at 1891. B12 was normal as well     He states that he has no fevers , chills or sweats. He has no changes in appetite and is gaining weight. No new lumps pr bumps. No rashes. Has not been on testosterone supplements , does not have diabetes. He has no known liver disease. He has no bleeding, He last had a colonoscopy at the age of 46 and never had it again. He has constipation. He has no nausea.  No HA    He quit smoking 6 years ago  He does not use ETOH    Past Medical History:   Diagnosis Date    Adverse effect of anesthesia     \"MY BLOOD PRESSURE DROPS ,\"    Alzheimer's disease 2/18/2011    PT DENIES    Chronic kidney disease     KIDNEYS FAILED R/T HTN    Chronic pain     LEFT HIP PAIN    Heart failure ([facility] [address] 6/2015    CHF    Hemodialysis patient [facility])     M-W-F    Osteoarthrosis, unspecified whether generalized or localized, unspecified site 2/18/2011    Other unknown and unspecified cause of morbidity or mortality     PT DOSES OFF FREQUENTLY    PUD (peptic ulcer disease)     Renal Disease 2/18/2011    Right inguinal hernia 3/1/2016    Unspecified asthma(493.90) 2/18/2011    Unspecified essential hypertension 2/18/2011    Ventral incisional hernia 3/1/2016      Past Surgical History: Procedure Laterality Date    CARDIAC SURG PROCEDURE UNLIST  4/29/15    CARDIAC CATHGERIZATION    HX GI      COLONOSCOPY    HX HEENT      TONSILLECTOMY    HX HERNIA REPAIR  3/8/16    Repair of right inguinal hernia with mesh,Repair of ventral incisional hernia with mesh    HX ORTHOPAEDIC Right 10/13/15     THR    HX ORTHOPAEDIC      left hip    HX OTHER SURGICAL  10/11/2016    revision of AV fistula left arm with repair of pseudoaneurysm    HX SMALL BOWEL RESECTION      HX UROLOGICAL Bilateral 2005    REMOVAL OF KIDNEYS    HX VASCULAR ACCESS      LT ARM WITH PORTS FOR DYALISIS      Social History     Tobacco Use    Smoking status: Never Smoker    Smokeless tobacco: Never Used   Substance Use Topics    Alcohol use: No      Family History   Problem Relation Age of Onset    Cancer Mother         BREAST    Cancer Sister         THROAT    Stroke Brother     Anesth Problems Neg Hx      Current Outpatient Medications   Medication Sig    bisacodyl (DULCOLAX, BISACODYL,) 5 mg EC tablet Take 5 mg by mouth daily as needed for Constipation.  cyclobenzaprine (FLEXERIL) 10 mg tablet TAKE ONE TABLET BY MOUTH THREE TIMES DAILY AS NEEDED FOR MUSCLE SPASM    carvedilol (COREG) 6.25 mg tablet Take 12.5 mg by mouth every other day.  sildenafil citrate (VIAGRA) 100 mg tablet Take 1 Tab by mouth as needed.  predniSONE (DELTASONE) 10 mg tablet 6 tabs today and reduce by 1 tab daily    losartan (COZAAR) 25 mg tablet Take  by mouth every other day.  calcium acetate (PHOSLO) 667 mg cap Take 3 Caps by mouth three (3) times daily (with meals).  aspirin delayed-release 81 mg tablet Take 81 mg by mouth daily.  acetaminophen (TYLENOL) 325 mg tablet Take 2 Tabs by mouth every six (6) hours.  Indications: PAIN    OTHER,NON-FORMULARY, RENAL VITAMIN WITH VIT D PLUS    albuterol (PROVENTIL HFA, VENTOLIN HFA, PROAIR HFA) 90 mcg/actuation inhaler Take 2 Puffs by inhalation every four (4) hours as needed for Wheezing. No current facility-administered medications for this visit. Allergies   Allergen Reactions    Other Food Anaphylaxis     Shell fish \"LUNGS, & THROAT CLOSES UP\"  MILK & CHEESE    Milk Anaphylaxis    Shellfish Derived Anaphylaxis    Iodinated Contrast- Oral And Iv Dye Other (comments)    Peanut Shortness of Breath    Penicillins Unknown (comments)        Review of Systems: A complete review of systems was obtained, negative except as described above. Physical Exam:     Visit Vitals  BP 98/63 (BP 1 Location: Right arm, BP Patient Position: Sitting)   Pulse (!) 102   Temp 98 °F (36.7 °C) (Oral)   Resp 16   Ht 5' 8\" (1.727 m)   Wt 179 lb 3.2 oz (81.3 kg)   SpO2 94%   BMI 27.25 kg/m²     ECOG PS: 1  General: No distress  Eyes: PERRLA, anicteric sclerae  HENT: Atraumatic, OP clear  Neck: Supple  Lymphatic: No cervical, supraclavicular, or inguinal adenopathy  Respiratory: CTAB, normal respiratory effort  CV: Normal rate, regular rhythm, no murmurs, no peripheral edema  GI: Soft, nontender, nondistended, no masses, no hepatomegaly, no splenomegaly  MS: Normal gait and station. Digits without clubbing or cyanosis. Skin: Seborrheic dermatitis  Psych: Alert, oriented, appropriate affect, normal judgment/insight    Results:     Lab Results   Component Value Date/Time    WBC 6.9 10/24/2018 11:26 AM    HGB 13.0 10/24/2018 11:26 AM    HCT 39.9 10/24/2018 11:26 AM    PLATELET 839 (H) 21/62/4842 11:26 AM     (H) 10/24/2018 11:26 AM    ABS.  NEUTROPHILS 9.7 (H) 11/04/2015 11:29 AM    Hemoglobin (POC) 10.5 (L) 10/11/2016 07:27 AM    Hematocrit (POC) 31 (L) 10/11/2016 07:27 AM     Lab Results   Component Value Date/Time    Sodium CANCELED 12/12/2018 12:00 AM    Potassium CANCELED 12/12/2018 12:00 AM    Chloride CANCELED 12/12/2018 12:00 AM    CO2 CANCELED 12/12/2018 12:00 AM    Glucose CANCELED 12/12/2018 12:00 AM    BUN CANCELED 12/12/2018 12:00 AM    Creatinine CANCELED 12/12/2018 12:00 AM    GFR est AA 10 (L) 04/06/2018 03:17 PM    GFR est non-AA 8 (L) 04/06/2018 03:17 PM    Calcium CANCELED 12/12/2018 12:00 AM    Sodium (POC) 138 10/11/2016 07:27 AM    Potassium (POC) 4.0 10/11/2016 07:27 AM    Chloride (POC) 94 (L) 10/11/2016 07:27 AM    Glucose (POC) 78 10/11/2016 07:27 AM    Glucose (POC) 105 (H) 11/04/2015 11:20 AM    BUN (POC) 41 (H) 10/11/2016 07:27 AM    Creatinine (POC) 7.8 (H) 10/11/2016 07:27 AM    Calcium, ionized (POC) 1.12 10/11/2016 07:27 AM     Lab Results   Component Value Date/Time    Bilirubin, total 0.8 11/04/2015 11:29 AM    ALT (SGPT) 48 11/04/2015 11:29 AM    AST (SGOT) 69 (H) 11/04/2015 11:29 AM    Alk. phosphatase 129 (H) 11/04/2015 11:29 AM    Protein, total 7.4 11/04/2015 11:29 AM    Albumin 2.7 (L) 11/04/2015 11:29 AM    Globulin 4.7 (H) 11/04/2015 11:29 AM       Lab Results   Component Value Date/Time    Iron % saturation 33 10/24/2018 11:26 AM    TIBC 262 10/24/2018 11:26 AM    Ferritin 1,891 (H) 11/13/2018 12:00 AM    Vitamin B12 950 11/13/2018 12:00 AM    Folate 17.8 11/13/2018 12:00 AM    Sed rate (ESR) 31 (H) 12/12/2018 12:00 AM    TSH 2.190 10/24/2018 11:26 AM    Hep C Virus Ab <0.1 02/23/2016 01:22 PM     Lab Results   Component Value Date/Time    INR 2.8 (H) 11/05/2015 07:05 AM    aPTT <20.0 (L) 11/12/2014 08:39 AM     Component      Latest Ref Rng & Units 10/24/2018          11:26 AM   Free Kappa Lt Chains, serum      3.3 - 19.4 mg/L 150.0 (H)   Free Lambda Lt Chains, serum      5.7 - 26.3 mg/L 176.4 (H)   Kappa/Lambda ratio, serum      0.26 - 1.65 0.85     Records reviewed and summarized above. Pathology report(s) reviewed above. Radiology report(s) reviewed above.       Assessment:   1)  Macrocytosis without anemia    Chronic  Causes reviewed and include medications, hemolysis, thyroid disorders, BM disorders, nutritional deficiencies and liver disease    B12 previously normal and no culprit meds found    Will obtain additional eval    2) Abnormal light chains    Reviewed and noted that K and L light chains were elevated on 10/24/18. However the ratio is normal and hence this is a reactive increase and not a paraproteinemia    Will repeat eval    3) Hyperferritinemia  > 1000  Will r/o hemolysis, hemochromatosis and liver disease    4) Cardiomyopathy  Per cardiology    5) CKD  On HD      Plan:     · Cbc diff, hemolysis panel, gammopathy labs, HFE gene analysis. Iron studies, USG abdomen    RTC 5 weeks    I appreciate the opportunity to participate in Mr. Caroline Romero care.     Signed By: Gualberto Victoria MD

## 2018-12-13 NOTE — PROGRESS NOTES
Annette Kwon is a 70 y.o. male here today as a new patient, anemia. 1. Have you been to the ER, urgent care clinic since your last visit? Hospitalized since your last visit? NO    2. Have you seen or consulted any other health care providers outside of the [address] [place] [address] since your last visit? Include any pap smears or colon screening.  NO

## 2018-12-14 NOTE — TELEPHONE ENCOUNTER
Left message for ARUN to return call-per Dr. Abdi Monday:        Duque Sensing does not require contrast

## 2018-12-16 LAB
BUN SERPL-MCNC: 18 MG/DL (ref 8–27)
BUN/CREAT SERPL: 4 (ref 10–24)
CALCIUM SERPL-MCNC: 9.6 MG/DL (ref 8.6–10.2)
CHLORIDE SERPL-SCNC: 92 MMOL/L (ref 96–106)
CO2 SERPL-SCNC: 31 MMOL/L (ref 20–29)
CREAT SERPL-MCNC: 5.14 MG/DL (ref 0.76–1.27)
ERYTHROCYTE [SEDIMENTATION RATE] IN BLOOD BY WESTERGREN METHOD: 31 MM/HR (ref 0–30)
GLUCOSE SERPL-MCNC: 83 MG/DL (ref 65–99)
PF4 HEPARIN CMPLX IGG SERPL IA: 0.25 OD (ref 0–0.4)
POTASSIUM SERPL-SCNC: 3.7 MMOL/L (ref 3.5–5.2)
SODIUM SERPL-SCNC: 139 MMOL/L (ref 134–144)

## 2018-12-18 ENCOUNTER — HOSPITAL ENCOUNTER (OUTPATIENT)
Dept: ULTRASOUND IMAGING | Age: 63
Discharge: HOME | End: 2018-12-18
Attending: INTERNAL MEDICINE
Payer: MEDICARE

## 2018-12-18 DIAGNOSIS — D64.9 ANEMIA, UNSPECIFIED TYPE: ICD-10-CM

## 2018-12-18 LAB
ALBUMIN SERPL ELPH-MCNC: 3.8 G/DL (ref 2.9–4.4)
ALBUMIN SERPL-MCNC: 4.2 G/DL (ref 3.6–4.8)
ALBUMIN/GLOB SERPL: 1.2 {RATIO} (ref 0.7–1.7)
ALBUMIN/GLOB SERPL: 1.4 {RATIO} (ref 1.2–2.2)
ALP SERPL-CCNC: 84 IU/L (ref 39–117)
ALPHA1 GLOB SERPL ELPH-MCNC: 0.2 G/DL (ref 0–0.4)
ALPHA2 GLOB SERPL ELPH-MCNC: 0.5 G/DL (ref 0.4–1)
ALT SERPL-CCNC: 11 IU/L (ref 0–44)
AST SERPL-CCNC: 12 IU/L (ref 0–40)
B-GLOBULIN SERPL ELPH-MCNC: 1.1 G/DL (ref 0.7–1.3)
BASOPHILS # BLD AUTO: 0.1 X10E3/UL (ref 0–0.2)
BASOPHILS NFR BLD AUTO: 1 %
BILIRUB SERPL-MCNC: 0.4 MG/DL (ref 0–1.2)
BUN SERPL-MCNC: 33 MG/DL (ref 8–27)
BUN/CREAT SERPL: 5 (ref 10–24)
CALCIUM SERPL-MCNC: 10.1 MG/DL (ref 8.6–10.2)
CHLORIDE SERPL-SCNC: 91 MMOL/L (ref 96–106)
CO2 SERPL-SCNC: 33 MMOL/L (ref 20–29)
CREAT SERPL-MCNC: 7.18 MG/DL (ref 0.76–1.27)
EOSINOPHIL # BLD AUTO: 0.3 X10E3/UL (ref 0–0.4)
EOSINOPHIL NFR BLD AUTO: 5 %
ERYTHROCYTE [DISTWIDTH] IN BLOOD BY AUTOMATED COUNT: 14.7 % (ref 12.3–15.4)
FERRITIN SERPL-MCNC: 1926 NG/ML (ref 30–400)
GAMMA GLOB SERPL ELPH-MCNC: 1.4 G/DL (ref 0.4–1.8)
GLOBULIN SER CALC-MCNC: 2.9 G/DL (ref 1.5–4.5)
GLOBULIN SER-MCNC: 3.3 G/DL (ref 2.2–3.9)
GLUCOSE SERPL-MCNC: 82 MG/DL (ref 65–99)
HAPTOGLOB SERPL-MCNC: 35 MG/DL (ref 34–200)
HCT VFR BLD AUTO: 35.5 % (ref 37.5–51)
HFE GENE MUT ANL BLD/T: NORMAL
HGB BLD-MCNC: 11.9 G/DL (ref 13–17.7)
IGA SERPL-MCNC: 219 MG/DL (ref 61–437)
IGG SERPL-MCNC: 1281 MG/DL (ref 700–1600)
IGM SERPL-MCNC: 51 MG/DL (ref 20–172)
IMM GRANULOCYTES # BLD: 0 X10E3/UL (ref 0–0.1)
IMM GRANULOCYTES NFR BLD: 0 %
INTERPRETATION SERPL IEP-IMP: ABNORMAL
KAPPA LC FREE SER-MCNC: 146.8 MG/L (ref 3.3–19.4)
KAPPA LC FREE/LAMBDA FREE SER: 1.18 {RATIO} (ref 0.26–1.65)
LAMBDA LC FREE SERPL-MCNC: 124.8 MG/L (ref 5.7–26.3)
LDH SERPL-CCNC: 194 IU/L (ref 121–224)
LYMPHOCYTES # BLD AUTO: 1.6 X10E3/UL (ref 0.7–3.1)
LYMPHOCYTES NFR BLD AUTO: 26 %
M PROTEIN SERPL ELPH-MCNC: ABNORMAL G/DL
MCH RBC QN AUTO: 35.1 PG (ref 26.6–33)
MCHC RBC AUTO-ENTMCNC: 33.5 G/DL (ref 31.5–35.7)
MCV RBC AUTO: 105 FL (ref 79–97)
MONOCYTES # BLD AUTO: 0.8 X10E3/UL (ref 0.1–0.9)
MONOCYTES NFR BLD AUTO: 13 %
NEUTROPHILS # BLD AUTO: 3.4 X10E3/UL (ref 1.4–7)
NEUTROPHILS NFR BLD AUTO: 55 %
PLATELET # BLD AUTO: 256 X10E3/UL (ref 150–379)
PLEASE NOTE:, 149534: ABNORMAL
POTASSIUM SERPL-SCNC: 3.6 MMOL/L (ref 3.5–5.2)
PROT SERPL-MCNC: 7.1 G/DL (ref 6–8.5)
RBC # BLD AUTO: 3.39 X10E6/UL (ref 4.14–5.8)
RETICS/RBC NFR AUTO: 1.5 % (ref 0.6–2.6)
SODIUM SERPL-SCNC: 140 MMOL/L (ref 134–144)
WBC # BLD AUTO: 6.2 X10E3/UL (ref 3.4–10.8)

## 2018-12-18 PROCEDURE — 76700 US EXAM ABDOM COMPLETE: CPT

## 2018-12-19 ENCOUNTER — TELEPHONE (OUTPATIENT)
Dept: CARDIOLOGY CLINIC | Age: 63
End: 2018-12-19

## 2018-12-19 NOTE — TELEPHONE ENCOUNTER
Writer spoke with representative  PHU[employer]([phone]) to schedule Right heart Cath. Alicia states that their office is waiting on permission to schedule from Dr. Sae Ervin. Rep states that approval should come today, and pt will be notified.

## 2018-12-20 LAB
BASOPHILS # BLD AUTO: 0.1 X10E3/UL (ref 0–0.2)
BASOPHILS NFR BLD AUTO: 1 %
EOSINOPHIL # BLD AUTO: 0.3 X10E3/UL (ref 0–0.4)
EOSINOPHIL NFR BLD AUTO: 5 %
ERYTHROCYTE [DISTWIDTH] IN BLOOD BY AUTOMATED COUNT: 14.7 % (ref 12.3–15.4)
HCT VFR BLD AUTO: 36.6 % (ref 37.5–51)
HGB BLD-MCNC: 11.8 G/DL (ref 13–17.7)
IMM GRANULOCYTES # BLD: 0 X10E3/UL (ref 0–0.1)
IMM GRANULOCYTES NFR BLD: 0 %
LYMPHOCYTES # BLD AUTO: 1.7 X10E3/UL (ref 0.7–3.1)
LYMPHOCYTES NFR BLD AUTO: 25 %
MCH RBC QN AUTO: 34 PG (ref 26.6–33)
MCHC RBC AUTO-ENTMCNC: 32.2 G/DL (ref 31.5–35.7)
MCV RBC AUTO: 106 FL (ref 79–97)
MONOCYTES # BLD AUTO: 0.9 X10E3/UL (ref 0.1–0.9)
MONOCYTES NFR BLD AUTO: 13 %
MORPHOLOGY BLD-IMP: ABNORMAL
NEUTROPHILS # BLD AUTO: 3.7 X10E3/UL (ref 1.4–7)
NEUTROPHILS NFR BLD AUTO: 56 %
PATH INTERP BLD-IMP: ABNORMAL
PATH REV BLD -IMP: ABNORMAL
PATHOLOGIST NAME: ABNORMAL
PLATELET # BLD AUTO: 283 X10E3/UL (ref 150–379)
RBC # BLD AUTO: 3.47 X10E6/UL (ref 4.14–5.8)
WBC # BLD AUTO: 6.7 X10E3/UL (ref 3.4–10.8)

## 2019-01-03 ENCOUNTER — HOSPITAL ENCOUNTER (OUTPATIENT)
Dept: CARDIAC CATH/INVASIVE PROCEDURES | Age: 64
Discharge: HOME | End: 2019-01-03
Attending: INTERNAL MEDICINE | Admitting: INTERNAL MEDICINE
Payer: MEDICARE

## 2019-01-03 VITALS
DIASTOLIC BLOOD PRESSURE: 51 MMHG | HEIGHT: 68 IN | SYSTOLIC BLOOD PRESSURE: 102 MMHG | OXYGEN SATURATION: 97 % | BODY MASS INDEX: 27.58 KG/M2 | TEMPERATURE: 98.2 F | WEIGHT: 182 LBS | RESPIRATION RATE: 18 BRPM | HEART RATE: 102 BPM

## 2019-01-03 PROCEDURE — 74011250636 HC RX REV CODE- 250/636: Performed by: INTERNAL MEDICINE

## 2019-01-03 RX ORDER — FENTANYL CITRATE 50 UG/ML
25-200 INJECTION, SOLUTION INTRAMUSCULAR; INTRAVENOUS
Status: DISCONTINUED | OUTPATIENT
Start: 2019-01-03 | End: 2019-01-04 | Stop reason: HOSPADM

## 2019-01-03 RX ORDER — DIPHENHYDRAMINE HYDROCHLORIDE 50 MG/ML
25 INJECTION, SOLUTION INTRAMUSCULAR; INTRAVENOUS ONCE
Status: COMPLETED | OUTPATIENT
Start: 2019-01-03 | End: 2019-01-03

## 2019-01-03 RX ORDER — ATROPINE SULFATE 0.1 MG/ML
0.4 INJECTION INTRAVENOUS AS NEEDED
Status: DISCONTINUED | OUTPATIENT
Start: 2019-01-03 | End: 2019-01-04 | Stop reason: HOSPADM

## 2019-01-03 RX ORDER — HEPARIN SODIUM 200 [USP'U]/100ML
2000 INJECTION, SOLUTION INTRAVENOUS ONCE
Status: ACTIVE | OUTPATIENT
Start: 2019-01-03 | End: 2019-01-04

## 2019-01-03 RX ORDER — MIDAZOLAM HYDROCHLORIDE 1 MG/ML
1-10 INJECTION, SOLUTION INTRAMUSCULAR; INTRAVENOUS
Status: DISCONTINUED | OUTPATIENT
Start: 2019-01-03 | End: 2019-01-04 | Stop reason: HOSPADM

## 2019-01-03 RX ORDER — LIDOCAINE HYDROCHLORIDE 10 MG/ML
5-30 INJECTION INFILTRATION; PERINEURAL AS NEEDED
Status: DISCONTINUED | OUTPATIENT
Start: 2019-01-03 | End: 2019-01-04 | Stop reason: HOSPADM

## 2019-01-03 RX ORDER — SODIUM CHLORIDE 0.9 % (FLUSH) 0.9 %
10 SYRINGE (ML) INJECTION AS NEEDED
Status: DISCONTINUED | OUTPATIENT
Start: 2019-01-03 | End: 2019-01-04 | Stop reason: HOSPADM

## 2019-01-03 RX ORDER — NITROGLYCERIN 0.4 MG/1
0.4 TABLET SUBLINGUAL AS NEEDED
Status: DISCONTINUED | OUTPATIENT
Start: 2019-01-03 | End: 2019-01-04 | Stop reason: HOSPADM

## 2019-01-03 RX ORDER — ACETAMINOPHEN 325 MG/1
650 TABLET ORAL
Status: DISCONTINUED | OUTPATIENT
Start: 2019-01-03 | End: 2019-01-04 | Stop reason: HOSPADM

## 2019-01-03 RX ORDER — HYDROCORTISONE SODIUM SUCCINATE 100 MG/2ML
100 INJECTION, POWDER, FOR SOLUTION INTRAMUSCULAR; INTRAVENOUS ONCE
Status: COMPLETED | OUTPATIENT
Start: 2019-01-03 | End: 2019-01-03

## 2019-01-03 RX ADMIN — DIPHENHYDRAMINE HYDROCHLORIDE 25 MG: 50 INJECTION, SOLUTION INTRAMUSCULAR; INTRAVENOUS at 14:26

## 2019-01-03 RX ADMIN — HYDROCORTISONE SODIUM SUCCINATE 100 MG: 100 INJECTION, POWDER, FOR SOLUTION INTRAMUSCULAR; INTRAVENOUS at 14:24

## 2019-01-03 NOTE — PROGRESS NOTES
Pt states feeling \" like himself \" No further complaints of nausea or vomiting. Pt dressed and escorted to car; Pt gait is steady. Pt verbalized understanding to follow up with Dr Ludwin Bond.

## 2019-01-03 NOTE — PROGRESS NOTES
Cardiac Cath Lab Recovery Arrival Note:      Blade Bojorquez arrived to Cardiac Cath Lab, Recovery Area. Staff introduced to patient. Patient identifiers verified with NAME and DATE OF BIRTH. Procedure verified with patient. Consent forms reviewed and signed by patient or authorized representative and verified. Allergies verified. Patient and family oriented to department. Patient and family informed of procedure and plan of care. Questions answered with review. Patient prepped for procedure, per orders from physician, prior to arrival.    Patient on cardiac monitor, non-invasive blood pressure, SPO2 monitor. On Room Air. Patient is A&Ox 4. Patient reports No Pain. Patient in stretcher, in low position, with side rails up, call bell within reach, patient instructed to call if assistance as needed. Patient prep in: [address], [place] [address]. Family in: At Home.    Prep by: Missy Osman RN

## 2019-01-03 NOTE — PROGRESS NOTES
Pt with complaints of nausea and vomited small amount of clear fluid. Pt states benadryl makes him nauseous. Dr Ludwin Bond made aware and at stretcher side to assess Pt. Benadryl added to Pt's Allergy list. Will monitor. Pt's procedure cancelled as per Dr Ludwin Bond.

## 2019-01-03 NOTE — PROCEDURES
The patient developed severe nausea and vomiting shortly after receiving IV benadryl in pre op holding. He eventually settled down. The patient concurred with me that we should postpone his procedure. He was discharged home and will be rescheduled for sometime next week.

## 2019-01-09 ENCOUNTER — TELEPHONE (OUTPATIENT)
Dept: CARDIOLOGY CLINIC | Age: 64
End: 2019-01-09

## 2019-01-09 NOTE — TELEPHONE ENCOUNTER
Called patient to schedule his next follow up with Dr. Chau Duncan.     Appointment has been scheduled on Tuesday January 22nd at 11:30am.

## 2019-01-10 ENCOUNTER — HOSPITAL ENCOUNTER (OUTPATIENT)
Dept: NUCLEAR MEDICINE | Age: 64
End: 2019-01-10
Attending: INTERNAL MEDICINE
Payer: MEDICARE

## 2019-01-11 ENCOUNTER — APPOINTMENT (OUTPATIENT)
Dept: NUCLEAR MEDICINE | Age: 64
End: 2019-01-11
Attending: INTERNAL MEDICINE
Payer: MEDICARE

## 2019-01-11 ENCOUNTER — HOSPITAL ENCOUNTER (OUTPATIENT)
Dept: SLEEP MEDICINE | Age: 64
Discharge: HOME | End: 2019-01-11
Payer: MEDICARE

## 2019-01-11 ENCOUNTER — APPOINTMENT (OUTPATIENT)
Dept: ULTRASOUND IMAGING | Age: 64
End: 2019-01-11
Attending: INTERNAL MEDICINE
Payer: MEDICARE

## 2019-01-11 VITALS
HEART RATE: 114 BPM | BODY MASS INDEX: 27.43 KG/M2 | HEIGHT: 68 IN | SYSTOLIC BLOOD PRESSURE: 100 MMHG | WEIGHT: 181 LBS | TEMPERATURE: 98.7 F | DIASTOLIC BLOOD PRESSURE: 67 MMHG | OXYGEN SATURATION: 93 %

## 2019-01-11 DIAGNOSIS — G47.33 OSA (OBSTRUCTIVE SLEEP APNEA): ICD-10-CM

## 2019-01-11 PROCEDURE — 95810 POLYSOM 6/> YRS 4/> PARAM: CPT | Performed by: INTERNAL MEDICINE

## 2019-01-11 PROCEDURE — 95810 POLYSOM 6/> YRS 4/> PARAM: CPT

## 2019-01-14 ENCOUNTER — TELEPHONE (OUTPATIENT)
Dept: SLEEP MEDICINE | Age: 64
End: 2019-01-14

## 2019-01-14 DIAGNOSIS — G47.33 OSA (OBSTRUCTIVE SLEEP APNEA): Primary | ICD-10-CM

## 2019-01-14 NOTE — TELEPHONE ENCOUNTER
Raulito Long is to be contacted by lead sleep technologist regarding results of Sleep Testing which was indicative of an average AHI of 8.3 per hour with an SpO2 edgardo of 77% . * A second polysomnogram has been ordered for mask fitting, PAP desensitizing protocol, and pressure titration. * Follow-up appointment will be scheduled 8-12 weeks following PAP initiation to gauge treatment response and compliance. Encounter Diagnosis   Name Primary?     HANDY (obstructive sleep apnea) Yes       Orders Placed This Encounter    SLEEP LAB (PAP TITRATION)     Standing Status:   Future     Standing Expiration Date:   7/14/2019     Order Specific Question:   Reason for Exam     Answer:   HANDY

## 2019-01-18 NOTE — TELEPHONE ENCOUNTER
Reviewed sleep study results with patient. He expressed understanding and is willing to proceed with a CPAP Titration.

## 2019-01-24 ENCOUNTER — TELEPHONE (OUTPATIENT)
Dept: CARDIOLOGY CLINIC | Age: 64
End: 2019-01-24

## 2019-01-24 NOTE — TELEPHONE ENCOUNTER
Called patient to schedule his next follow up appointment since he cancelled last one on January 22nd.     Appointment is scheduled on Tuesday February 5th at 1pm with Dr. Whitley Prajapati

## 2019-01-29 ENCOUNTER — HOSPITAL ENCOUNTER (OUTPATIENT)
Age: 64
Setting detail: OUTPATIENT SURGERY
Discharge: HOME | End: 2019-01-29
Attending: INTERNAL MEDICINE | Admitting: INTERNAL MEDICINE
Payer: MEDICARE

## 2019-01-29 VITALS
BODY MASS INDEX: 27.28 KG/M2 | SYSTOLIC BLOOD PRESSURE: 159 MMHG | WEIGHT: 180 LBS | DIASTOLIC BLOOD PRESSURE: 99 MMHG | TEMPERATURE: 98.4 F | RESPIRATION RATE: 18 BRPM | HEART RATE: 91 BPM | HEIGHT: 68 IN | OXYGEN SATURATION: 100 %

## 2019-01-29 DIAGNOSIS — I50.42 CHRONIC COMBINED SYSTOLIC AND DIASTOLIC HEART FAILURE (HCC): ICD-10-CM

## 2019-01-29 LAB
ANION GAP SERPL CALC-SCNC: 13 MMOL/L (ref 5–15)
BUN SERPL-MCNC: 47 MG/DL (ref 6–20)
BUN/CREAT SERPL: 5 (ref 12–20)
CALCIUM SERPL-MCNC: 9.8 MG/DL (ref 8.5–10.1)
CHLORIDE SERPL-SCNC: 96 MMOL/L (ref 97–108)
CO2 SERPL-SCNC: 31 MMOL/L (ref 21–32)
CREAT SERPL-MCNC: 8.74 MG/DL (ref 0.7–1.3)
ERYTHROCYTE [DISTWIDTH] IN BLOOD BY AUTOMATED COUNT: 12.5 % (ref 11.5–14.5)
GLUCOSE SERPL-MCNC: 85 MG/DL (ref 65–100)
HCT VFR BLD AUTO: 38 % (ref 36.6–50.3)
HGB BLD-MCNC: 11.8 G/DL (ref 12.1–17)
MCH RBC QN AUTO: 34.2 PG (ref 26–34)
MCHC RBC AUTO-ENTMCNC: 31.1 G/DL (ref 30–36.5)
MCV RBC AUTO: 110.1 FL (ref 80–99)
NRBC # BLD: 0 K/UL (ref 0–0.01)
NRBC BLD-RTO: 0 PER 100 WBC
PLATELET # BLD AUTO: 228 K/UL (ref 150–400)
PMV BLD AUTO: 10.7 FL (ref 8.9–12.9)
POTASSIUM SERPL-SCNC: 3.7 MMOL/L (ref 3.5–5.1)
RBC # BLD AUTO: 3.45 M/UL (ref 4.1–5.7)
SODIUM SERPL-SCNC: 140 MMOL/L (ref 136–145)
WBC # BLD AUTO: 6 K/UL (ref 4.1–11.1)

## 2019-01-29 PROCEDURE — C1751 CATH, INF, PER/CENT/MIDLINE: HCPCS | Performed by: INTERNAL MEDICINE

## 2019-01-29 PROCEDURE — 77030013406 HC CATH CTRL EDWD -B: Performed by: INTERNAL MEDICINE

## 2019-01-29 PROCEDURE — 74011250636 HC RX REV CODE- 250/636

## 2019-01-29 PROCEDURE — C1894 INTRO/SHEATH, NON-LASER: HCPCS

## 2019-01-29 PROCEDURE — C1894 INTRO/SHEATH, NON-LASER: HCPCS | Performed by: INTERNAL MEDICINE

## 2019-01-29 PROCEDURE — 99153 MOD SED SAME PHYS/QHP EA: CPT | Performed by: INTERNAL MEDICINE

## 2019-01-29 PROCEDURE — 36415 COLL VENOUS BLD VENIPUNCTURE: CPT

## 2019-01-29 PROCEDURE — 93451 RIGHT HEART CATH: CPT | Performed by: INTERNAL MEDICINE

## 2019-01-29 PROCEDURE — 99152 MOD SED SAME PHYS/QHP 5/>YRS: CPT | Performed by: INTERNAL MEDICINE

## 2019-01-29 PROCEDURE — 93005 ELECTROCARDIOGRAM TRACING: CPT

## 2019-01-29 PROCEDURE — 80048 BASIC METABOLIC PNL TOTAL CA: CPT

## 2019-01-29 PROCEDURE — C1769 GUIDE WIRE: HCPCS | Performed by: INTERNAL MEDICINE

## 2019-01-29 PROCEDURE — C1751 CATH, INF, PER/CENT/MIDLINE: HCPCS

## 2019-01-29 PROCEDURE — 74011250636 HC RX REV CODE- 250/636: Performed by: INTERNAL MEDICINE

## 2019-01-29 PROCEDURE — 77030013406 HC CATH CTRL EDWD -B

## 2019-01-29 PROCEDURE — 85027 COMPLETE CBC AUTOMATED: CPT

## 2019-01-29 RX ORDER — SODIUM CHLORIDE 0.9 % (FLUSH) 0.9 %
5-40 SYRINGE (ML) INJECTION EVERY 8 HOURS
Status: DISCONTINUED | OUTPATIENT
Start: 2019-01-29 | End: 2019-01-29 | Stop reason: HOSPADM

## 2019-01-29 RX ORDER — LIDOCAINE HYDROCHLORIDE 10 MG/ML
INJECTION INFILTRATION; PERINEURAL AS NEEDED
Status: DISCONTINUED | OUTPATIENT
Start: 2019-01-29 | End: 2019-01-29 | Stop reason: HOSPADM

## 2019-01-29 RX ORDER — MIDAZOLAM HYDROCHLORIDE 1 MG/ML
INJECTION, SOLUTION INTRAMUSCULAR; INTRAVENOUS AS NEEDED
Status: DISCONTINUED | OUTPATIENT
Start: 2019-01-29 | End: 2019-01-29 | Stop reason: HOSPADM

## 2019-01-29 RX ORDER — HEPARIN SODIUM 200 [USP'U]/100ML
INJECTION, SOLUTION INTRAVENOUS
Status: COMPLETED | OUTPATIENT
Start: 2019-01-29 | End: 2019-01-29

## 2019-01-29 RX ORDER — ACETAMINOPHEN 325 MG/1
650 TABLET ORAL
Status: DISCONTINUED | OUTPATIENT
Start: 2019-01-29 | End: 2019-01-29 | Stop reason: HOSPADM

## 2019-01-29 RX ORDER — SODIUM CHLORIDE 0.9 % (FLUSH) 0.9 %
5-40 SYRINGE (ML) INJECTION AS NEEDED
Status: DISCONTINUED | OUTPATIENT
Start: 2019-01-29 | End: 2019-01-29 | Stop reason: HOSPADM

## 2019-01-29 RX ORDER — FENTANYL CITRATE 50 UG/ML
INJECTION, SOLUTION INTRAMUSCULAR; INTRAVENOUS AS NEEDED
Status: DISCONTINUED | OUTPATIENT
Start: 2019-01-29 | End: 2019-01-29 | Stop reason: HOSPADM

## 2019-01-29 NOTE — Clinical Note
clipped, prepped with ChloraPrep and draped. Wet prep solution applied at: 1609. Wet prep solution dried at: 1611. Wet prep elapsed drying time: 2 mins.

## 2019-01-29 NOTE — PROCEDURES
Cardiac Catheterization Procedure Note   Patient: Izaiah Solares  MRN: [id number]  SSN: [id number]   YOB: 1955 Age: 70 y.o.   Sex: male    Date of Procedure: 1/29/2019   Pre-procedure Diagnosis: Congestive Heart Failure and Shortness of Breath  Post-procedure Diagnosis: Congestive Heart Failure and pulmonary hypertension  Procedure: Right Heart Cath  :  Dr. Fili Patrick MD    Assistant(s):  None  Anesthesia: Moderate Sedation   Estimated Blood Loss: Less than 10 mL   Specimens Removed: None  Findings: PCWP 10; PA 75/27; RV  76/2; RA 4  Complications: None   Implants:  None  Signed by:  Fili Patrick MD  1/29/2019  4:42 PM

## 2019-01-29 NOTE — Clinical Note
TRANSFER - IN REPORT:  
 
Verbal report received from: Holding room RN. Report consisted of patient's Situation, Background, Assessment and  
Recommendations(SBAR). Opportunity for questions and clarification was provided. Assessment completed upon patient's arrival to unit and care assumed. Patient transported with a Cardiac Cath Tech / Patient Care Tech.

## 2019-01-29 NOTE — PROGRESS NOTES
Cardiac Cath Lab Procedure Area Arrival Note:    Ignacio Zhao arrived to Cardiac Cath Lab, Procedure Area. Patient identifiers verified with NAME and DATE OF BIRTH. Procedure verified with patient. Consent forms verified. Allergies verified. Patient informed of procedure and plan of care. Questions answered with review. Patient voiced understanding of procedure and plan of care. Patient on cardiac monitor, non-invasive blood pressure, SPO2 monitor. Placed on O2 @ 2 lpm via nasal cannula. IV of normal saline on pump at 10 ml/hr. Patient status doing well without problems. Patient is A&Ox 4. Patient reports no discomfort at this time. Patient medicated during procedure with orders obtained and verified by Dr. Gagan Cutler. Refer to patients Cardiac Cath Lab PROCEDURE REPORT for vital signs, assessment, status, and response during procedure, printed at end of case. Printed report on chart or scanned into chart.

## 2019-01-29 NOTE — PROGRESS NOTES
Cardiac Cath Lab Recovery Arrival Note:      Manuel Liz arrived to Cardiac Cath Lab, Recovery Area. Staff introduced to patient. Patient identifiers verified with NAME and DATE OF BIRTH. Procedure verified with patient. Consent forms reviewed and signed by patient or authorized representative and verified. Allergies verified. Patient and family oriented to department. Patient and family informed of procedure and plan of care. Questions answered with review. Patient prepped for procedure, per orders from physician, prior to arrival.    Patient on cardiac monitor, non-invasive blood pressure, SPO2 monitor. On Room Air. Patient is A&Ox 4. Patient reports No pain. Patient in stretcher, in low position, with side rails up, call bell within reach, patient instructed to call if assistance as needed. Patient prep in: [address], [place] [address]. Family in: Waiting room.    Prep by: Modesto Duke RN

## 2019-01-29 NOTE — H&P
[address] HISTORY AND PHYSICAL Marleni Michele 
MR#: [id number] : 1955 ACCOUNT #: [de-identified] ADMIT DATE: 2019 HISTORY OF PRESENT ILLNESS:  The patient is here for a right heart catheterization to assess his pulmonary artery pressures in light of his severe systolic heart failure. This is a specific request from the advanced heart failure team.  Patient is well known to me for several years. He has a cardiomyopathy with an ejection fraction that has slowly gotten worse. He got an ICD sometime last year, ejection fraction is still lower. He is on guideline directed medical therapy as allowed on the basis of his renal failure and remains dyspneic with moderate exertion. He denies any chest pain. He had coronary angiography last year and his coronary arteries are normal. 
 
MEDICATIONS PRIOR TO ADMISSION:  Are as follows:  cyclobenzaprine, carvedilol, sildenafil, losartan, aspirin, Tylenol, albuterol inhaler. ALLERGIES:  SHELLFISH, BENADRYL, ZOFRAN, IODINATED CONTRAST, PEANUTS, PENICILLIN. REVIEW OF SYSTEMS:  Otherwise negative. PHYSICAL EXAMINATION: 
GENERAL:  This is a well-developed, pleasant male, age 70. VITAL SIGNS:  As follows:  Heart rate is 102, blood pressure is 120/70. HEENT:  Unremarkable. NECK:  Supple. No adenopathy. CHEST WALL:  Nontender. LUNGS:  Clear to auscultation and percussion. HEART:  Regular rate and rhythm, normal S1, normal S2. No friction rub or S3 gallop. ABDOMEN:  Soft, nontender. No bruits, ascites or palpable masses. EXTREMITIES:  No significant edema. Normal pedal pulses. NEUROLOGIC:  The patient is awake, alert and appropriate. No focal motor sinus detected. IMPRESSION AND PLAN:  This patient has chronic systolic heart failure that is slowly worsening, etiology unclear.   He is here today for right heart pressures at the request of the advanced heart failure team to assess if he has significant pulmonary hypertension. MD HUMPHREY Reyez/POLINA 
D: 01/29/2019 15:50 T: 01/29/2019 16:07 JOB #: [id number]

## 2019-01-29 NOTE — PROGRESS NOTES
TRANSFER - OUT REPORT:    Verbal report given to Will RCIS(name) on Lindsey Meraz  being transferred to RR(unit) for routine post - op       Report consisted of patients Situation, Background, Assessment and   Recommendations(SBAR). Information from the following report(s) SBAR, Procedure Summary, MAR and Recent Results was reviewed with the receiving nurse. Lines:       Opportunity for questions and clarification was provided.       Patient transported with:   [facility]

## 2019-01-29 NOTE — PROGRESS NOTES
TRANSFER - IN REPORT:    Verbal report received from Will CVT on Saundra Bosworth  being received from procedure area for routine progression of care. Report consisted of patients Situation, Background, Assessment and Recommendations(SBAR). Information from the following report(s) SBAR, Procedure Summary, MAR, Recent Results and Cardiac Rhythm NSR was reviewed with the receiving clinician. Opportunity for questions and clarification was provided. Assessment completed upon patients arrival to [address] [place] [address] and care assumed. Cardiac Cath Lab Recovery Arrival Note:    Saundra Bosworth arrived to [facility] recovery area. Patient procedure= RHC. Patient on cardiac monitor, non-invasive blood pressure, SPO2 monitor. On  O2 @ 2 lpm via NC. IVL. Patient status doing well without problems. Patient is A&Ox 4. Patient reports No pain. PROCEDURE SITE CHECK:    Procedure site:without any bleeding and No Hematoma, No pain/discomfort reported at procedure site. No change in patient status. Continue to monitor patient and status.

## 2019-01-30 LAB
ATRIAL RATE: 96 BPM
CALCULATED P AXIS, ECG09: 71 DEGREES
CALCULATED R AXIS, ECG10: 27 DEGREES
CALCULATED T AXIS, ECG11: 71 DEGREES
DIAGNOSIS, 93000: NORMAL
P-R INTERVAL, ECG05: 194 MS
Q-T INTERVAL, ECG07: 406 MS
QRS DURATION, ECG06: 108 MS
QTC CALCULATION (BEZET), ECG08: 512 MS
VENTRICULAR RATE, ECG03: 96 BPM

## 2019-01-30 NOTE — PROGRESS NOTES
Pt placed in sitting position; right groin site with no bleeding or hematoma. Pt tolerating food tray well.

## 2019-01-30 NOTE — PROGRESS NOTES
Pt verbalized understanding of discharge instructions. PIV removed and guaze with tape placed; no bleeding or swelling. Right groin site with no bleeding or hematoma. Pt with no complaints of discomfort. Pt escorted to [facility] via wheelchair with belongings.

## 2019-03-01 DIAGNOSIS — J98.4 RESTRICTIVE LUNG DISEASE: Primary | ICD-10-CM

## 2019-03-12 ENCOUNTER — OFFICE VISIT (OUTPATIENT)
Dept: CARDIOLOGY CLINIC | Age: 64
End: 2019-03-12

## 2019-03-12 VITALS
RESPIRATION RATE: 20 BRPM | WEIGHT: 187.7 LBS | BODY MASS INDEX: 28.45 KG/M2 | SYSTOLIC BLOOD PRESSURE: 152 MMHG | OXYGEN SATURATION: 98 % | HEIGHT: 68 IN | HEART RATE: 96 BPM | TEMPERATURE: 98.1 F | DIASTOLIC BLOOD PRESSURE: 92 MMHG

## 2019-03-12 DIAGNOSIS — R06.02 SHORTNESS OF BREATH: ICD-10-CM

## 2019-03-12 DIAGNOSIS — T82.591A: ICD-10-CM

## 2019-03-12 DIAGNOSIS — J84.9 INTERSTITIAL LUNG DISEASE (HCC): Primary | ICD-10-CM

## 2019-03-12 DIAGNOSIS — I10 ESSENTIAL HYPERTENSION: ICD-10-CM

## 2019-03-12 RX ORDER — GUAIFENESIN 600 MG/1
600 TABLET, EXTENDED RELEASE ORAL DAILY
Qty: 30 TAB | Refills: 6 | Status: ON HOLD | OUTPATIENT
Start: 2019-03-12 | End: 2019-05-01

## 2019-03-12 NOTE — PROGRESS NOTES
600 [facility] in [place], [address] [place] [address] Note    Patient name: Michael Rivero  Patient : 1955  Patient MRN: [id number]  Date of service: 19    Primary care physician: Nohelia Martinez MD  Primary cardiologist: Dr. Koki Trujillo MD     Primary nephrologist: Andre Gilbert MD     CHIEF COMPLAINT:  Chronic systolic heart failure     PLAN:  Heart failure with preserved EF and severe pulmonary hypertension on optimal GDMT   Continue current therapy for HF (cozaar and coreg; hold on non-dialysis days due to hypotension)  Volume management per dialysis; patient euvolemic by RHC  Reinforced low sal diet and fluid restriction to 6 x 8oz glasses per day  Prescribed O2 at home due to hypoxia with exertion (down to 86% on RA)  Schedule high resolution chest CT for interstitial lung disease (severe restrictive disease)  Referral to pulmonary medicine  Ultrasound of fistula to determine aneurysm and velocities   Schedule echocardiogram with bubble study  CT chest/angiogram for pulmonary AVM as last resort due to allergy to contrast  Schedule sleep study for CPAP adjustment  Not candidate for cardiac MRI or pulmonary MRI due to dialysis  Hematology consultation appreciated (very high free lambda chains even for dialysis patient, anemia macrocytic with normal vitamin counts and high ferritin), need referral to pulmonologist (restrictive lung disease) and endocrinologist (subclinical hyperthyroidism)  Not surgical candidate for renal transplant until workup completed for severe PAH with severe TR  Could not tolerate VQ scan  Add to labs in the future: HIT profile, TOMI, aTTR  Follow-up with primary cardiologist  Follow-up with PCP for flu vaccine annually and pneumonia vaccine every 5 years  Return to A[facility] Clinic in 4 weeks    PLAN OF CARE:  Patient has HF with preserved EF; he is euvolemic and heart failure is compensated.  The cause of diastolic heart failure, systemic hypertension and severe pulmonary hypertension is multifactorial, but predominantly related to high output heart failure AND hypoxia might also contribute to high PVR. Patient needs to complete workup for hypoxia (needs 3 liters NC) and high output state (cardiac output >9). Hypoxia with high output failure is concerning for pulmonary AVMs, but CT chest/angiogram for pulmonary AVM as our last resort due to allergy to contrast and contraindication to use MRI with contrast; also I would prefer that it was ordered and further managed in coordination with pulmonologist. Would also make sure that high flow state and hypoxia is not a combination of two independent conditions: AV fistula (will get ultrasound to rule out aneurysm) and either (a) L-R cardiac shunt (will check bubble study) or (b) lung disease (will check HR CT).     IMPRESSION:  Fatigue  Shortness of breath  Chronic diastolic heart failure  Stage C, NYHA class IIIB/IV symptoms  Heart failure with preserved ejection fraction  Mild LV dysfunction, LVEF 40-45%  High output heart failure   ESRD on HD with normal filling pressures  Severe pulmonary hypertension   Severe tricuspid regurgitation  Non-restorative sleep with confirmed HANDY  High ferritin level likely due to dialysis  Severe pulmonary restriction     CARDIAC IMAGING:  Echo (9/6/18) LVEF 40-45%, LVEDD 5.1cm, IVS 1.3cm, , RV size and function normal, RV pressure 91mmHg, severe TR, IVC dilated with partial collapse with inspiratio.   EKG (4/6/18) NSR, LVH  LHC (4/6/18)  normal coronary arteries, LV EF 30-35%, moderately elevated LV filling pressure  NST (2/21/18) abnormal with an inferior wall defect with a moderate amount of reperfusion, LVEF 38%  RHC (1/29/19)  RV Pressures 67 mmHg    2 mmHg     873 mmHg/sec        PA Pressures 75 mmHg    27 mmHg    43 mmHg         RA Pressures   4 mmHg     7 mmHg    5 mmHg      PCW Pressures   10 mmHg     13 mmHg    12 mmHg      Phase: Baseline Data HR TDCO TDCI Chad CI PVR/SVR TPR/TVR   Cardiac Output Results 100 bpm    9.17 L/min    4.17 L/min/m2    3.69 L/min/m2        Blood Oximetry 1/29/19 1558--1/29/19 1641 before discharge     PA O2 Sat   71.2 %     6MW (3/12/19) desaturated to 86%     OTHER IMAGING:  PFT (11/20/18) Severe restrictive ventilatory defect; low DLCO     HISTORY OF PRESENT ILLNESS:  I had the pleasure of seeing Eugene Ortega in [facility] beds at the [facility] in [address] a 70 y. o. male with h/o HTN, ESRD on HD. Patient was initially referred to Dr. John Escobar for exertional chest pain while exercising at the gym; he also was interested in cardiac clearance for renal transplantation. Patient had a myocardial perfusion imaging study on 02/21/2018, which was abnormal with an inferior wall defect with a moderate amount of reperfusion with an ejection fraction of 38%. He previously had coronary angiography in 2014 and had nonocclusive disease at that time. Colman RETREAT was repeated in April 2018 and showed normal coronaries, LVEF 35% and elevated filling pressures at weight 169lbs. He was started on medical therap for heart failure and echocardiogram was repeated in September 2018. It showed mild LV dysfunction LVEF 40-45%, severe pulmonary hypertension RVSP 91mmHg and severe TR with normal RV size and function.        INTERVAL HISTORY:  Today, patient presents for routine clinic visit.     Patient is doing poorly. Patient walked to our clinic from parking garage very slowly with a cane, has not stopped though. He walk very slowly one mile twice a week. Patient could not use stairs. Patient can perform home activities slowly. Cold air makes this much worse. He noticed chronic productive cough.     Patient denies symptoms of volume overload, abdominal bloating or leg edema. He is concerned dialysis removing too much fluid and muscle cramping became very bad.     He has tried to avoid HF meds on non-dialysis days because it causes him to feel lightheaded, but doing well with these meds on dialysis days. Patient denies other cardiac symptoms such as chest pain or leg pain with walking. Patient is compliant with fluid restriction and taking medications as prescribed. Patient manages medications himself. REVIEW OF SYSTEMS:  General: Denies fever, night sweats. Ear, nose and throat: Denies difficulty hearing, sinus problems, runny nose, post-nasal drip, ringing in ears, mouth sores, loose teeth, ear pain, nosebleeds, sore throate, facial pain or numbess  Cardiovascular: see above in the interval history  Respiratory: Denies cough, wheezing, sputum production, hemoptysis. Gastrointestinal: Denies heartburn, constipation, intolerance to certain foods, diarrhea, abdominal pain, nausea, vomiting, difficulty swallowing, blood in stool  Kidney and bladder: Denies painful urination, frequent urination, urgency, prostate problems and impotence  Musculoskeletal: Denies joint pain, muscle weakness  Skin and hair: Denies change in existing skin lesions, hair loss or increase, breast changes    PHYSICAL EXAM:  Vital signs:  Visit Vitals  BP (!) 152/92 (BP 1 Location: Right arm, BP Patient Position: Sitting)   Pulse 96   Temp 98.1 °F (36.7 °C) (Oral)   Resp 20   Ht 5' 8\" (1.727 m)   Wt 187 lb 11.2 oz (85.1 kg)   SpO2 98%   BMI 28.54 kg/m²     General: Patient is well developed, well-nourished in no acute distress  HEENT: Normocephalic and atraumatic. No scleral icterus. Pupils are equal, round and reactive to light and accomodation. No conjunctival injection. Oropharynx is clear. Neck: Supple. No evidence of thyroid enlargements or lymphadenopathy. JVD: Cannot be appreciated   Lungs: Breath sounds are equal and clear bilaterally. No wheezes, rhonchi, or rales. Heart: Regular rate and rhythm with normal S1 and S2. No murmurs, gallops or rubs. Abdomen: Soft, no mass or tenderness.  No organomegaly or hernia. Bowel sounds present. Genitourinary and rectal: deferred  Extremities: No cyanosis, clubbing, or edema. Neurologic: No focal sensory or motor deficits are noted. Grossly intact. Psychiatric: Awake, alert an doriented x 3. Appropriate mood and affect. Skin: Warm, dry and well perfused. No lesions, nodules or rashes are noted.     PAST MEDICAL HISTORY:  Past Medical History:   Diagnosis Date    Adverse effect of anesthesia     \"MY BLOOD PRESSURE DROPS ,\"    Alzheimer's disease 2/18/2011    PT DENIES    Chronic kidney disease     KIDNEYS FAILED R/T HTN    Chronic pain     LEFT HIP PAIN    Heart failure ([facility] [address] 6/2015    CHF    Hemodialysis patient ([facility] [address]     M-W-F    Osteoarthrosis, unspecified whether generalized or localized, unspecified site 2/18/2011    Other unknown and unspecified cause of morbidity or mortality     PT DOSES OFF FREQUENTLY    PUD (peptic ulcer disease)     Renal Disease 2/18/2011    Right inguinal hernia 3/1/2016    Unspecified asthma(493.90) 2/18/2011    Unspecified essential hypertension 2/18/2011    Ventral incisional hernia 3/1/2016       PAST SURGICAL HISTORY:  Past Surgical History:   Procedure Laterality Date    CARDIAC SURG PROCEDURE UNLIST  4/29/15    CARDIAC CATHGERIZATION    HX GI      COLONOSCOPY    HX HEENT      TONSILLECTOMY    HX HERNIA REPAIR  3/8/16    Repair of right inguinal hernia with mesh,Repair of ventral incisional hernia with mesh    HX ORTHOPAEDIC Right 10/13/15     THR    HX ORTHOPAEDIC      left hip    HX OTHER SURGICAL  10/11/2016    revision of AV fistula left arm with repair of pseudoaneurysm    HX SMALL BOWEL RESECTION      HX UROLOGICAL Bilateral 2005    REMOVAL OF KIDNEYS    HX VASCULAR ACCESS      LT ARM WITH PORTS FOR DYALISIS       FAMILY HISTORY:  Family History   Problem Relation Age of Onset    Cancer Mother         BREAST    Cancer Sister         THROAT    Stroke Brother     Anesth Problems Neg Hx SOCIAL HISTORY:  Social History     Socioeconomic History    Marital status:      Spouse name: Not on file    Number of children: Not on file    Years of education: Not on file    Highest education level: Not on file   Tobacco Use    Smoking status: Never Smoker    Smokeless tobacco: Never Used   Substance and Sexual Activity    Alcohol use: No    Drug use: No       LABORATORY RESULTS:     Labs Latest Ref Rng & Units 1/29/2019   WBC 4.1 - 11.1 K/uL 6.0   RBC 4.10 - 5.70 M/uL 3.45(L)   Hemoglobin 12.1 - 17.0 g/dL 11. 8(L)   Hematocrit 36.6 - 50.3 % 38.0   MCV 80.0 - 99.0 . 1(H)   Platelets 376 - 300 K/uL 228   Calcium 8.5 - 10.1 MG/DL 9.8   Glucose 65 - 100 mg/dL 85   BUN 6 - 20 MG/DL 47(H)   Creatinine 0.70 - 1.30 MG/DL 8.74(H)   Sodium 136 - 145 mmol/L 140   Potassium 3.5 - 5.1 mmol/L 3.7   Some recent data might be hidden     Lab Results   Component Value Date/Time    TSH 2.190 10/24/2018 11:26 AM    TSH 3.23 11/04/2015 11:29 AM       CURRENT MEDICATIONS:    Current Outpatient Medications:     bisacodyl (DULCOLAX, BISACODYL,) 5 mg EC tablet, Take 5 mg by mouth daily as needed for Constipation. , Disp: , Rfl:     cyclobenzaprine (FLEXERIL) 10 mg tablet, TAKE ONE TABLET BY MOUTH THREE TIMES DAILY AS NEEDED FOR MUSCLE SPASM, Disp: 90 Tab, Rfl: 0    carvedilol (COREG) 6.25 mg tablet, Take 12.5 mg by mouth every other day., Disp: 60 Tab, Rfl: 12    sildenafil citrate (VIAGRA) 100 mg tablet, Take 1 Tab by mouth as needed. , Disp: 8 Tab, Rfl: 12    losartan (COZAAR) 25 mg tablet, Take  by mouth every other day., Disp: , Rfl:     calcium acetate (PHOSLO) 667 mg cap, Take 3 Caps by mouth three (3) times daily (with meals). , Disp: , Rfl:     aspirin delayed-release 81 mg tablet, Take 81 mg by mouth daily. , Disp: , Rfl:     acetaminophen (TYLENOL) 325 mg tablet, Take 2 Tabs by mouth every six (6) hours.  Indications: PAIN, Disp: 60 Tab, Rfl: 0    albuterol (PROVENTIL HFA, VENTOLIN HFA, PROAIR HFA) 90 mcg/actuation inhaler, Take 2 Puffs by inhalation every four (4) hours as needed for Wheezing., Disp: 1 Inhaler, Rfl: 12      Thank you for your referral and allowing me to participate in this patient's care.     Paul Comer MD PhD  [address] [place] [address], [address]  Phone: [phone]  Fax: [phone]

## 2019-03-12 NOTE — PATIENT INSTRUCTIONS
We will call you to schedule:  Ultrasound  CT  Echocardiogram  Pulmonologist    Follow up in one month

## 2019-03-13 ENCOUNTER — TELEPHONE (OUTPATIENT)
Dept: CARDIOLOGY CLINIC | Age: 64
End: 2019-03-13

## 2019-03-14 ENCOUNTER — TELEPHONE (OUTPATIENT)
Dept: CARDIOLOGY CLINIC | Age: 64
End: 2019-03-14

## 2019-03-26 ENCOUNTER — HOSPITAL ENCOUNTER (OUTPATIENT)
Dept: CT IMAGING | Age: 64
Discharge: HOME | End: 2019-03-26
Attending: INTERNAL MEDICINE
Payer: MEDICARE

## 2019-03-26 ENCOUNTER — HOSPITAL ENCOUNTER (OUTPATIENT)
Dept: NON INVASIVE DIAGNOSTICS | Age: 64
Discharge: HOME | End: 2019-03-26
Attending: INTERNAL MEDICINE
Payer: MEDICARE

## 2019-03-26 ENCOUNTER — HOSPITAL ENCOUNTER (OUTPATIENT)
Dept: VASCULAR SURGERY | Age: 64
Discharge: HOME | End: 2019-03-26
Attending: INTERNAL MEDICINE
Payer: MEDICARE

## 2019-03-26 VITALS — WEIGHT: 187 LBS | BODY MASS INDEX: 28.34 KG/M2 | HEIGHT: 68 IN

## 2019-03-26 DIAGNOSIS — I10 ESSENTIAL HYPERTENSION: ICD-10-CM

## 2019-03-26 DIAGNOSIS — T82.591A: ICD-10-CM

## 2019-03-26 DIAGNOSIS — R06.02 SHORTNESS OF BREATH: ICD-10-CM

## 2019-03-26 DIAGNOSIS — J84.9 INTERSTITIAL LUNG DISEASE (HCC): ICD-10-CM

## 2019-03-26 LAB
ECHO LV INTERNAL DIMENSION DIASTOLIC: 5.27 CM (ref 4.2–5.9)
ECHO LV INTERNAL DIMENSION SYSTOLIC: 4.3 CM
ECHO LV IVSD: 1.02 CM (ref 0.6–1)
ECHO LV MASS 2D: 213.2 G (ref 88–224)
ECHO LV MASS INDEX 2D: 107.3 G/M2 (ref 49–115)
ECHO LV POSTERIOR WALL DIASTOLIC: 0.85 CM (ref 0.6–1)
ECHO PULMONARY ARTERY SYSTOLIC PRESSURE (PASP): 33 MMHG
ECHO RV INTERNAL DIMENSION: 2.59 CM
ECHO TV REGURGITANT MAX VELOCITY: 356.3 CM/S
ECHO TV REGURGITANT PEAK GRADIENT: 50.8 MMHG

## 2019-03-26 PROCEDURE — 71250 CT THORAX DX C-: CPT

## 2019-03-26 PROCEDURE — 93971 EXTREMITY STUDY: CPT

## 2019-03-26 PROCEDURE — 93306 TTE W/DOPPLER COMPLETE: CPT

## 2019-03-29 ENCOUNTER — HOSPITAL ENCOUNTER (OUTPATIENT)
Dept: SLEEP MEDICINE | Age: 64
Discharge: HOME | End: 2019-03-29
Payer: MEDICARE

## 2019-03-29 VITALS
BODY MASS INDEX: 28.83 KG/M2 | DIASTOLIC BLOOD PRESSURE: 85 MMHG | HEART RATE: 100 BPM | WEIGHT: 183.7 LBS | OXYGEN SATURATION: 94 % | TEMPERATURE: 98.9 F | SYSTOLIC BLOOD PRESSURE: 131 MMHG | HEIGHT: 67 IN

## 2019-03-29 DIAGNOSIS — G47.33 OSA (OBSTRUCTIVE SLEEP APNEA): ICD-10-CM

## 2019-03-29 PROCEDURE — 95811 POLYSOM 6/>YRS CPAP 4/> PARM: CPT | Performed by: INTERNAL MEDICINE

## 2019-04-01 ENCOUNTER — TELEPHONE (OUTPATIENT)
Dept: SLEEP MEDICINE | Age: 64
End: 2019-04-01

## 2019-04-01 DIAGNOSIS — G47.33 OSA (OBSTRUCTIVE SLEEP APNEA): Primary | ICD-10-CM

## 2019-04-03 ENCOUNTER — DOCUMENTATION ONLY (OUTPATIENT)
Dept: SLEEP MEDICINE | Age: 64
End: 2019-04-03

## 2019-04-03 ENCOUNTER — TELEPHONE (OUTPATIENT)
Dept: CARDIOLOGY CLINIC | Age: 64
End: 2019-04-03

## 2019-04-03 NOTE — TELEPHONE ENCOUNTER
Orders Placed This Encounter    AMB SUPPLY ORDER     Diagnosis: Sleep Apnea ICD-10 Code (G47.30); ICD-9 Code (780.57). Positive Airway Pressure Therapy: Duration of need: 99 months. ResMed VPAP Auto (VAuto Mode): Maximum IPAP: 15 cmH2O; Minimum EPAP: 5 cmH2O; PS: 6 cmH2O. Ramp Time: 30 Minutes. CPAP mask -  As fitted during titration OR patient preference, headgear, tubing, and filter;  heated humidifier; wireless modem. Remote monitoring enrollment. Julius Villar MD, FAASM; NPI: [id number]  Electronically signed. 04/03/19

## 2019-04-03 NOTE — TELEPHONE ENCOUNTER
----- Message from Roni Mcgowan MD sent at 3/27/2019  5:04 PM EDT -----  His pulmonary appointment is in 2 months and they wanted to see chest CT results. It looks like it could be lung infection. Can we ask  to forward these results to his pulmonologist and ask if they could schedule appointment sooner? Thank you.    ----- Message -----  From: Ammon Navarrete Results In  Sent: 3/27/2019   9:03 AM  To: Roni Mcgowan MD    I [facility] and scheduled patient for 4/4/19 at 1 pm.  I called patient and he states he cannot make it due to work. I then gave him number so he can reschedule, and I faxed results to [facility] so they are aware of results.

## 2019-04-12 ENCOUNTER — TELEPHONE (OUTPATIENT)
Dept: SLEEP MEDICINE | Age: 64
End: 2019-04-12

## 2019-05-01 ENCOUNTER — HOSPITAL ENCOUNTER (INPATIENT)
Age: 64
LOS: 3 days | Discharge: HOME | DRG: 193 | End: 2019-05-04
Attending: INTERNAL MEDICINE | Admitting: INTERNAL MEDICINE
Payer: MEDICARE

## 2019-05-01 ENCOUNTER — APPOINTMENT (OUTPATIENT)
Dept: GENERAL RADIOLOGY | Age: 64
DRG: 193 | End: 2019-05-01
Attending: INTERNAL MEDICINE
Payer: MEDICARE

## 2019-05-01 ENCOUNTER — OFFICE VISIT (OUTPATIENT)
Dept: CARDIOLOGY CLINIC | Age: 64
End: 2019-05-01

## 2019-05-01 VITALS
WEIGHT: 179.6 LBS | RESPIRATION RATE: 24 BRPM | OXYGEN SATURATION: 97 % | HEART RATE: 92 BPM | SYSTOLIC BLOOD PRESSURE: 138 MMHG | DIASTOLIC BLOOD PRESSURE: 84 MMHG | BODY MASS INDEX: 28.19 KG/M2 | TEMPERATURE: 97.5 F | HEIGHT: 67 IN

## 2019-05-01 DIAGNOSIS — I50.32 CHRONIC DIASTOLIC CONGESTIVE HEART FAILURE (HCC): Primary | ICD-10-CM

## 2019-05-01 PROBLEM — R06.00 DYSPNEA: Status: ACTIVE | Noted: 2019-05-01

## 2019-05-01 PROBLEM — J18.9 CAP (COMMUNITY ACQUIRED PNEUMONIA): Status: ACTIVE | Noted: 2019-05-01

## 2019-05-01 PROCEDURE — 77030029684 HC NEB SM VOL KT MONA -A

## 2019-05-01 PROCEDURE — 65660000000 HC RM CCU STEPDOWN

## 2019-05-01 PROCEDURE — 74011000258 HC RX REV CODE- 258: Performed by: INTERNAL MEDICINE

## 2019-05-01 PROCEDURE — 94640 AIRWAY INHALATION TREATMENT: CPT

## 2019-05-01 PROCEDURE — 74011000250 HC RX REV CODE- 250: Performed by: INTERNAL MEDICINE

## 2019-05-01 PROCEDURE — 87070 CULTURE OTHR SPECIMN AEROBIC: CPT

## 2019-05-01 PROCEDURE — 71046 X-RAY EXAM CHEST 2 VIEWS: CPT

## 2019-05-01 PROCEDURE — 74011250636 HC RX REV CODE- 250/636: Performed by: INTERNAL MEDICINE

## 2019-05-01 RX ORDER — GUAIFENESIN 600 MG/1
600 TABLET, EXTENDED RELEASE ORAL EVERY 12 HOURS
Status: ON HOLD | COMMUNITY
End: 2020-05-15

## 2019-05-01 RX ORDER — FLUTICASONE PROPIONATE AND SALMETEROL 250; 50 UG/1; UG/1
1 POWDER RESPIRATORY (INHALATION) 2 TIMES DAILY
Status: DISCONTINUED | OUTPATIENT
Start: 2019-05-01 | End: 2019-05-01 | Stop reason: CLARIF

## 2019-05-01 RX ORDER — ALBUTEROL SULFATE 90 UG/1
2 AEROSOL, METERED RESPIRATORY (INHALATION)
Status: DISCONTINUED | OUTPATIENT
Start: 2019-05-01 | End: 2019-05-01 | Stop reason: CLARIF

## 2019-05-01 RX ORDER — IPRATROPIUM BROMIDE AND ALBUTEROL SULFATE 2.5; .5 MG/3ML; MG/3ML
3 SOLUTION RESPIRATORY (INHALATION)
Status: DISCONTINUED | OUTPATIENT
Start: 2019-05-01 | End: 2019-05-03 | Stop reason: SDUPTHER

## 2019-05-01 RX ORDER — LEVOFLOXACIN 5 MG/ML
500 INJECTION, SOLUTION INTRAVENOUS
Status: DISCONTINUED | OUTPATIENT
Start: 2019-05-03 | End: 2019-05-04 | Stop reason: HOSPADM

## 2019-05-01 RX ORDER — LOSARTAN POTASSIUM 25 MG/1
25 TABLET ORAL EVERY OTHER DAY
Status: DISCONTINUED | OUTPATIENT
Start: 2019-05-01 | End: 2019-05-04 | Stop reason: HOSPADM

## 2019-05-01 RX ORDER — LEVOFLOXACIN 5 MG/ML
750 INJECTION, SOLUTION INTRAVENOUS ONCE
Status: COMPLETED | OUTPATIENT
Start: 2019-05-01 | End: 2019-05-01

## 2019-05-01 RX ORDER — BUDESONIDE 0.5 MG/2ML
500 INHALANT ORAL
Status: DISCONTINUED | OUTPATIENT
Start: 2019-05-01 | End: 2019-05-04 | Stop reason: HOSPADM

## 2019-05-01 RX ORDER — CARVEDILOL 12.5 MG/1
12.5 TABLET ORAL EVERY OTHER DAY
Status: DISCONTINUED | OUTPATIENT
Start: 2019-05-01 | End: 2019-05-04 | Stop reason: HOSPADM

## 2019-05-01 RX ORDER — LEVOFLOXACIN 750 MG/1
750 TABLET ORAL
Status: DISCONTINUED | OUTPATIENT
Start: 2019-05-01 | End: 2019-05-01

## 2019-05-01 RX ORDER — ASPIRIN 81 MG/1
81 TABLET ORAL DAILY
Status: DISCONTINUED | OUTPATIENT
Start: 2019-05-02 | End: 2019-05-04 | Stop reason: HOSPADM

## 2019-05-01 RX ORDER — FLUTICASONE PROPIONATE AND SALMETEROL 250; 50 UG/1; UG/1
1 POWDER RESPIRATORY (INHALATION) EVERY 12 HOURS
COMMUNITY
End: 2020-11-12 | Stop reason: ALTCHOICE

## 2019-05-01 RX ORDER — ALBUTEROL SULFATE 0.83 MG/ML
2.5 SOLUTION RESPIRATORY (INHALATION)
Status: DISCONTINUED | OUTPATIENT
Start: 2019-05-01 | End: 2019-05-04 | Stop reason: HOSPADM

## 2019-05-01 RX ORDER — ARFORMOTEROL TARTRATE 15 UG/2ML
15 SOLUTION RESPIRATORY (INHALATION)
Status: DISCONTINUED | OUTPATIENT
Start: 2019-05-01 | End: 2019-05-04 | Stop reason: HOSPADM

## 2019-05-01 RX ORDER — GUAIFENESIN 600 MG/1
600 TABLET, EXTENDED RELEASE ORAL DAILY
Status: DISCONTINUED | OUTPATIENT
Start: 2019-05-02 | End: 2019-05-04 | Stop reason: HOSPADM

## 2019-05-01 RX ADMIN — ARFORMOTEROL TARTRATE 15 MCG: 15 SOLUTION RESPIRATORY (INHALATION) at 14:03

## 2019-05-01 RX ADMIN — BUDESONIDE 500 MCG: 0.5 INHALANT RESPIRATORY (INHALATION) at 14:03

## 2019-05-01 RX ADMIN — CEFEPIME HYDROCHLORIDE 1 G: 1 INJECTION, POWDER, FOR SOLUTION INTRAMUSCULAR; INTRAVENOUS at 18:33

## 2019-05-01 RX ADMIN — LEVOFLOXACIN 750 MG: 5 INJECTION, SOLUTION INTRAVENOUS at 16:20

## 2019-05-01 RX ADMIN — ARFORMOTEROL TARTRATE 15 MCG: 15 SOLUTION RESPIRATORY (INHALATION) at 21:02

## 2019-05-01 RX ADMIN — BUDESONIDE 500 MCG: 0.5 INHALANT RESPIRATORY (INHALATION) at 21:02

## 2019-05-01 NOTE — H&P
[facility] in [place], [place]  Admission Note    Patient name: Rodríguez Mathews  Patient : 1955  Patient MRN: [id number]  Date of service: 19    Attending physician: Dr. Amalia Rock, hospitalist  Admitting [facility]  Cough and shortness of breath     PLAN:  Admit patient for further treatment of pneumonia, debilitating resting dyspnea and exertional hypoxia  Discussed with Dr. Amalia Rock who will be the attending physician  Call AHF consult inpatient, as needed     IMPRESSION:  Dyspnea at rest  Wheezing at rest  Cough productive yellow sputum  Pneumonia by chest CT  Chronic diastolic heart failure  Heart failure with preserved ejection fraction  Mild LV dysfunction, LVEF 45-50%  High cardiac output likely due to infection  ESRD on HD with normal filling pressures  Severe pulmonary hypertension likely due to hypoxia  Severe tricuspid regurgitation  Non-restorative sleep with confirmed HANDY  High ferritin level likely due to dialysis  Severe pulmonary restriction, low DLCO     CARDIAC IMAGING:  Echo (18) LVEF 40-45%, LVEDD 5.1cm, IVS 1.3cm, , RV size and function normal, RV pressure 91mmHg, severe TR, IVC dilated with partial collapse with inspiratio.   EKG (18) NSR, LVH  LHC (18)  normal coronary arteries, LV EF 30-35%, moderately elevated LV filling pressure  NST (18) abnormal with an inferior wall defect with a moderate amount of reperfusion, LVEF 38%  RHC (19)  RV Pressures 67 mmHg    2 mmHg      873 mmHg/sec          PA Pressures 75 mmHg    27 mmHg    43 mmHg            RA Pressures     4 mmHg      7 mmHg    5 mmHg      PCW Pressures     10 mmHg      13 mmHg    12 mmHg      Phase: Baseline      Data HR TDCO TDCI Chad CI PVR/SVR TPR/TVR   Cardiac Output Results 100 bpm    9.17 L/min    4.17 L/min/m2    3.69 L/min/m2          Blood Oximetry 19 1558--19 1641 before discharge      PA O2 Sat   71.2 %      6MW (3/12/19) desaturated to 86%     OTHER IMAGING:  PFT (11/20/18) Severe restrictive ventilatory defect; low DLCO  Chest CT (3/26/19) . Multiple scattered bilateral subcentimeter lung nodules that are predominantly groundglass or part solid are nonspecific. Differential considerations include but are not limited to nonspecific infection or inflammation. Follow-up routine chest CT without contrast is recommended in 3 months. 2. No evidence for interstitial lung disease or pulmonary fibrosis. 3. Cardiomegaly. Mild dilatation of the ascending aorta measuring 3.5 cm in diameter. Dilatation of the main pulmonary artery suggesting pulmonary artery hypertension. 4. A mildly enlarged left axillary lymph node is nonspecific. Attention on  follow-up is suggested.     23X     HISTORY OF PRESENT ILLNESS:  I had the pleasure of seeing Eugene Ortega in [facility] at [address] in [address] a 70 y. o. male with h/o HFpEF, HTN, ESRD on HD. Patient was initially referred to Dr. Robby Engle for exertional chest pain while exercising at the gym. The patient himself was interested in cardiac clearance for renal transplantation given reduced HFpEF and severe pulmonary hypertension .      Patient had a myocardial perfusion imaging study on 02/21/2018, which was abnormal with an inferior wall defect with a moderate amount of reperfusion with an ejection fraction of 38%. He previously had coronary angiography in 2014 and had nonocclusive disease at that time. Egnar RETREAT was repeated in April 2018 and showed normal coronaries, LVEF 35% and elevated filling pressures at weight 169lbs. He was started on medical therapy for heart failure and echocardiogram was repeated in September 2018.  It showed mild LV dysfunction LVEF 40-45%, severe pulmonary hypertension RVSP 91mmHg and severe TR with normal RV size and function. Right heart catherization revealed that he has heart failure with preserved ejection fraction, severe pulmonary hypertension and high cardiac output, but that he was euvolemic with dialysis and therefore was compensated heart failure. Pulmonary hypertension was severe and patient at risk of RV failure, already showing significant TR. Further evaluation showed that his pulmonary hypertension was more likely related to lung than heart disease. He was found to have severe exertional hypoxia and we prescribed him home O2. PFT showed severe restriction and low DLCO. Chest CT showed disseminated infection which might explain his high cardiac output (probably amplified by presence of AV fistula for dialysis). He was seen by pulmonary who started levoquin and zithromax with a plan to follow-up on chest CT early May, but it does not appear he knew to take antibiotics.      INTERVAL HISTORY:  Today, patient presents for routine clinic visit.     Patient is doing poorly.  Patient was brought to our clinic on a wheelchair. He refused to walk even few steps due to severe dyspnea. He was coughing yellow sputum, audibly wheezing in clinic. Patient denies symptoms of volume overload, abdominal bloating or leg edema.       REVIEW OF SYSTEMS:  General: Denies fever, night sweats. Ear, nose and throat: Denies difficulty hearing, sinus problems, runny nose, post-nasal drip, ringing in ears, mouth sores, loose teeth, ear pain, nosebleeds, sore throate, facial pain or numbess  Cardiovascular: see above in the interval history  Respiratory: Possitive cough, wheezing, and sputum production,negative hemoptysis.   Gastrointestinal: Denies heartburn, constipation, intolerance to certain foods, diarrhea, abdominal pain, nausea, vomiting, difficulty swallowing, blood in stool  Kidney and bladder: Denies painful urination, frequent urination, urgency, prostate problems and impotence  Musculoskeletal: Denies joint pain, muscle weakness  Skin and hair: Denies change in existing skin lesions, hair loss or increase, breast changes    PHYSICAL EXAM:  Vital signs:   Visit Vitals  /87 (BP 1 Location: Right arm, BP Patient Position: Sitting)   Pulse 88   Temp 97.7 °F (36.5 °C)   Resp 20   Wt 175 lb 0.7 oz (79.4 kg)   BMI 27.42 kg/m²     General: Patient is well developed, well-nourished appears tired and dyspneic at rest  HEENT: Normocephalic and atraumatic. No scleral icterus. Pupils are equal, round and reactive to light and accomodation. No conjunctival injection. Oropharynx is clear. Neck: Supple. No evidence of thyroid enlargements or lymphadenopathy. JVD: Cannot be appreciated   Lungs: loud wheezing throughout; constant cough during exam, tachypnea  Heart: Regular rate and rhythm with normal S1 and S2. No murmurs, gallops or rubs. Abdomen: Soft, no mass or tenderness. No organomegaly or hernia. Bowel sounds present. Genitourinary and rectal: deferred  Extremities: No cyanosis, clubbing, or edema. Neurologic: No focal sensory or motor deficits are noted. Grossly intact. Psychiatric: Awake, alert an doriented x 3. Appropriate mood and affect. Skin: Warm, dry and well perfused. No lesions, nodules or rashes are noted.     PAST MEDICAL HISTORY:  Past Medical History:   Diagnosis Date    Adverse effect of anesthesia     \"MY BLOOD PRESSURE DROPS ,\"    Alzheimer's disease 2/18/2011    PT DENIES    Chronic kidney disease     KIDNEYS FAILED R/T HTN    Chronic pain     LEFT HIP PAIN    Heart failure ([facility] [address] 6/2015    CHF    Hemodialysis patient ([facility] [address]     M-W-F    Osteoarthrosis, unspecified whether generalized or localized, unspecified site 2/18/2011    Other unknown and unspecified cause of morbidity or mortality     PT DOSES OFF FREQUENTLY    PUD (peptic ulcer disease)     Renal Disease 2/18/2011    Right inguinal hernia 3/1/2016    Unspecified asthma(493.90) 2/18/2011    Unspecified essential hypertension 2/18/2011    Ventral incisional hernia 3/1/2016       PAST SURGICAL HISTORY:  Past Surgical History:   Procedure Laterality Date    CARDIAC SURG PROCEDURE UNLIST  4/29/15    CARDIAC CATHGERIZATION    HX GI      COLONOSCOPY    HX HEENT      TONSILLECTOMY    HX HERNIA REPAIR  3/8/16    Repair of right inguinal hernia with mesh,Repair of ventral incisional hernia with mesh    HX ORTHOPAEDIC Right 10/13/15     THR    HX ORTHOPAEDIC      left hip    HX OTHER SURGICAL  10/11/2016    revision of AV fistula left arm with repair of pseudoaneurysm    HX SMALL BOWEL RESECTION      HX UROLOGICAL Bilateral 2005    REMOVAL OF KIDNEYS    HX VASCULAR ACCESS      LT ARM WITH PORTS FOR DYALISIS       FAMILY HISTORY:  Family History   Problem Relation Age of Onset    Cancer Mother         BREAST    Cancer Sister         THROAT    Stroke Brother     Anesth Problems Neg Hx        SOCIAL HISTORY:  Social History     Socioeconomic History    Marital status:      Spouse name: Not on file    Number of children: Not on file    Years of education: Not on file    Highest education level: Not on file   Tobacco Use    Smoking status: Never Smoker    Smokeless tobacco: Never Used   Substance and Sexual Activity    Alcohol use: No    Drug use: No       LABORATORY RESULTS:   No flowsheet data found.   Lab Results   Component Value Date/Time    TSH 2.190 10/24/2018 11:26 AM    TSH 3.23 11/04/2015 11:29 AM       CURRENT MEDICATIONS:    Current Facility-Administered Medications:     albuterol (PROVENTIL HFA, VENTOLIN HFA, PROAIR HFA) inhaler 2 Puff, 2 Puff, Inhalation, Q4H PRN, Alexandra Prado NP  [address] [place] [address]  [START ON 5/2/2019] aspirin delayed-release tablet 81 mg, 81 mg, Oral, DAILY, Alexandra Prado NP    carvedilol (COREG) tablet 12.5 mg, 12.5 mg, Oral, EVERY OTHER DAY, Alexandra Prado NP    [START ON 5/2/2019] guaiFENesin ER (MUCINEX) tablet 600 mg, 600 mg, Oral, DAILY, Alexandra Prado NP    losartan (COZAAR) tablet 25 mg, 25 mg, Oral, EVERY OTHER DAY, Lisa Quinonez, Simone Jack NP    fluticasone-salmeterol (ADVAIR) 250mcg-50mcg/puff, 1 Puff, Inhalation, BID, Mindy Brady NP  [facility]  [START ON 5/2/2019] levoFLOXacin (LEVAQUIN) tablet 750 mg, 750 mg, Oral, ACB, Mindy Brady NP      Thank you for your referral and allowing me to participate in this patient's care.     Kourtney Hussein MD PhD  [address] [place] [address], [address]  Phone: [phone]  Fax: [phone]

## 2019-05-01 NOTE — PROGRESS NOTES
Cefepime dose adjustment for renal function  Indication:  pneumonia  Current regimen:  2gm q8h  Abx regimen: levofloxacin  No results for input(s): WBC, CREA, BUN in the last 72 hours. Est CrCl:   ml/min; UO:   ml/kg/hr  Temp (24hrs), Av.6 °F (36.4 °C), Min:97.5 °F (36.4 °C), Max:97.7 °F (36.5 °C)    Cultures:      Plan: Change to 1gm q24h. Admission note indicates hemodialysis. Consult to nephrology placed.

## 2019-05-01 NOTE — PROGRESS NOTES
1230: Pt arrived to unit. Tele placed. VSS. No c/o pain. [facility] notified. Orders received  Assessment completed upon patient?s arrival to unit and care assumed. Problem: Falls - Risk of  Goal: *Absence of Falls  Description  Document Mitzy Mccauley Fall Risk and appropriate interventions in the flowsheet.   Outcome: Progressing Towards Goal

## 2019-05-01 NOTE — PROGRESS NOTES
Day #1 of levaquin  Indication:  pneumonia  Current regimen:  750 mg IV Q 24 hr x5 days  Abx regimen: levaquin  No results for input(s): WBC, CREA, BUN in the last 72 hours.   Est CrCl: ESRD on HD   Temp (24hrs), Av.6 °F (36.4 °C), Min:97.5 °F (36.4 °C), Max:97.7 °F (36.5 °C)    Cultures: sputum cx in process    Plan: Change to levaquin 750 mg day 1, then 500 mg IV Q 48 hr  (total 5 days)

## 2019-05-01 NOTE — PROGRESS NOTES
[facility] in [facility], [address] [facility] Note    Patient name: Leni Garza  Patient : 1955  Patient MRN: [id number]  Date of service: 19    Primary care physician: Du Martinez MD  Primary cardiologist: Dr. Lion Rankin MD     Primary nephrologist: Mario Noble MD     CHIEF COMPLAINT:  Cough and shortness of breath     PLAN:  Admit patient for further treatment of pneumonia, debilitating resting dyspnea and exertional hypoxia  Discussed with Dr. Norma Gagnon who will be the attending physcian     IMPRESSION:  Dyspnea at rest  Wheezing at rest  Cough productive yellow sputum  Pneumonia by chest CT  Chronic diastolic heart failure  Heart failure with preserved ejection fraction  Mild LV dysfunction, LVEF 45-50%  High cardiac output likely due to infection  ESRD on HD with normal filling pressures  Severe pulmonary hypertension likely due to hypoxia  Severe tricuspid regurgitation  Non-restorative sleep with confirmed HANDY  High ferritin level likely due to dialysis  Severe pulmonary restriction, low DLCO     CARDIAC IMAGING:  Echo (18) LVEF 40-45%, LVEDD 5.1cm, IVS 1.3cm, , RV size and function normal, RV pressure 91mmHg, severe TR, IVC dilated with partial collapse with inspiratio.   EKG (18) NSR, LVH  LHC (18)  normal coronary arteries, LV EF 30-35%, moderately elevated LV filling pressure  NST (18) abnormal with an inferior wall defect with a moderate amount of reperfusion, LVEF 38%  RHC (19)  RV Pressures 67 mmHg    2 mmHg      873 mmHg/sec          PA Pressures 75 mmHg    27 mmHg    43 mmHg            RA Pressures     4 mmHg      7 mmHg    5 mmHg      PCW Pressures     10 mmHg      13 mmHg    12 mmHg      Phase: Baseline      Data HR TDCO TDCI Chad CI PVR/SVR TPR/TVR   Cardiac Output Results 100 bpm    9.17 L/min    4.17 L/min/m2    3.69 L/min/m2          Blood Oximetry 19 1558--19 1641 before discharge      PA O2 Sat   71.2 %      6MW (3/12/19) desaturated to 86%     OTHER IMAGING:  PFT (11/20/18) Severe restrictive ventilatory defect; low DLCO  Chest CT (3/26/19) . Multiple scattered bilateral subcentimeter lung nodules that are predominantly groundglass or part solid are nonspecific. Differential considerations include but are not limited to nonspecific infection or inflammation. Follow-up routine chest CT without contrast is recommended in 3 months. 2. No evidence for interstitial lung disease or pulmonary fibrosis. 3. Cardiomegaly. Mild dilatation of the ascending aorta measuring 3.5 cm in diameter. Dilatation of the main pulmonary artery suggesting pulmonary artery hypertension. 4. A mildly enlarged left axillary lymph node is nonspecific. Attention on  follow-up is suggested.     23X     HISTORY OF PRESENT ILLNESS:  I had the pleasure of seeing Eugene Ortega in [facility] at [facility] in [address] a 70 y. o. male with h/o HFpEF, HTN, ESRD on HD. Patient was initially referred to Dr. William Degroot for exertional chest pain while exercising at the gym. The patient himself was interested in cardiac clearance for renal transplantation given reduced HFpEF and severe pulmonary hypertension .      Patient had a myocardial perfusion imaging study on 02/21/2018, which was abnormal with an inferior wall defect with a moderate amount of reperfusion with an ejection fraction of 38%. He previously had coronary angiography in 2014 and had nonocclusive disease at that time. [place] [facility] was repeated in April 2018 and showed normal coronaries, LVEF 35% and elevated filling pressures at weight 169lbs. He was started on medical therapy for heart failure and echocardiogram was repeated in September 2018.  It showed mild LV dysfunction LVEF 40-45%, severe pulmonary hypertension RVSP 91mmHg and severe TR with normal RV size and function. Right heart catherization revealed that he has heart failure with preserved ejection fraction, severe pulmonary hypertension and high cardiac output, but that he was euvolemic with dialysis and therefore was compensated heart failure. Pulmonary hypertension was severe and patient at risk of RV failure, already showing significant TR. Further evaluation showed that his pulmonary hypertension was more likely related to lung than heart disease. He was found to have severe exertional hypoxia and we prescribed him home O2. PFT showed severe restriction and low DLCO. Chest CT showed disseminated infection which might explain his high cardiac output (probably amplified by presence of AV fistula for dialysis). He was seen by pulmonary who started levoquin and zithromax with a plan to follow-up on chest CT early May, but it does not appear he knew to take antibiotics.      INTERVAL HISTORY:  Today, patient presents for routine clinic visit.     Patient is doing poorly.  Patient was brought to our clinic on a wheelchair. He refused to walk even few steps due to severe dyspnea. He was coughing yellow sputum, audibly wheezing in clinic. Patient denies symptoms of volume overload, abdominal bloating or leg edema.       REVIEW OF SYSTEMS:  General: Denies fever, night sweats. Ear, nose and throat: Denies difficulty hearing, sinus problems, runny nose, post-nasal drip, ringing in ears, mouth sores, loose teeth, ear pain, nosebleeds, sore throate, facial pain or numbess  Cardiovascular: see above in the interval history  Respiratory: Possitive cough, wheezing, and sputum production,negative hemoptysis.   Gastrointestinal: Denies heartburn, constipation, intolerance to certain foods, diarrhea, abdominal pain, nausea, vomiting, difficulty swallowing, blood in stool  Kidney and bladder: Denies painful urination, frequent urination, urgency, prostate problems and impotence  Musculoskeletal: Denies joint pain, muscle weakness  Skin and hair: Denies change in existing skin lesions, hair loss or increase, breast changes    PHYSICAL EXAM:  Vital signs:   Visit Vitals  /84 (BP 1 Location: Right arm, BP Patient Position: Sitting)   Pulse 92   Temp 97.5 °F (36.4 °C) (Oral)   Resp 24   Ht 5' 7\" (1.702 m)   Wt 179 lb 9.6 oz (81.5 kg)   SpO2 97%   BMI 28.13 kg/m²     General: Patient is well developed, well-nourished appears tired and dyspneic at rest  HEENT: Normocephalic and atraumatic. No scleral icterus. Pupils are equal, round and reactive to light and accomodation. No conjunctival injection. Oropharynx is clear. Neck: Supple. No evidence of thyroid enlargements or lymphadenopathy. JVD: Cannot be appreciated   Lungs: loud wheezing throughout; constant cough during exam, tachypnea  Heart: Regular rate and rhythm with normal S1 and S2. No murmurs, gallops or rubs. Abdomen: Soft, no mass or tenderness. No organomegaly or hernia. Bowel sounds present. Genitourinary and rectal: deferred  Extremities: No cyanosis, clubbing, or edema. Neurologic: No focal sensory or motor deficits are noted. Grossly intact. Psychiatric: Awake, alert an doriented x 3. Appropriate mood and affect. Skin: Warm, dry and well perfused. No lesions, nodules or rashes are noted.     PAST MEDICAL HISTORY:  Past Medical History:   Diagnosis Date    Adverse effect of anesthesia     \"MY BLOOD PRESSURE DROPS ,\"    Alzheimer's disease 2/18/2011    PT DENIES    Chronic kidney disease     KIDNEYS FAILED R/T HTN    Chronic pain     LEFT HIP PAIN    Heart failure ([facility] [address] 6/2015    CHF    Hemodialysis patient ([facility] [address]     M-W-F    Osteoarthrosis, unspecified whether generalized or localized, unspecified site 2/18/2011    Other unknown and unspecified cause of morbidity or mortality     PT DOSES OFF FREQUENTLY    PUD (peptic ulcer disease)     Renal Disease 2/18/2011    Right inguinal hernia 3/1/2016    Unspecified asthma(493.90) 2/18/2011    Unspecified essential hypertension 2/18/2011    Ventral incisional hernia 3/1/2016       PAST SURGICAL HISTORY:  Past Surgical History:   Procedure Laterality Date    CARDIAC SURG PROCEDURE UNLIST  4/29/15    CARDIAC CATHGERIZATION    HX GI      COLONOSCOPY    HX HEENT      TONSILLECTOMY    HX HERNIA REPAIR  3/8/16    Repair of right inguinal hernia with mesh,Repair of ventral incisional hernia with mesh    HX ORTHOPAEDIC Right 10/13/15     THR    HX ORTHOPAEDIC      left hip    HX OTHER SURGICAL  10/11/2016    revision of AV fistula left arm with repair of pseudoaneurysm    HX SMALL BOWEL RESECTION      HX UROLOGICAL Bilateral 2005    REMOVAL OF KIDNEYS    HX VASCULAR ACCESS      LT ARM WITH PORTS FOR DYALISIS       FAMILY HISTORY:  Family History   Problem Relation Age of Onset    Cancer Mother         BREAST    Cancer Sister         THROAT    Stroke Brother     Anesth Problems Neg Hx        SOCIAL HISTORY:  Social History     Socioeconomic History    Marital status:      Spouse name: Not on file    Number of children: Not on file    Years of education: Not on file    Highest education level: Not on file   Tobacco Use    Smoking status: Never Smoker    Smokeless tobacco: Never Used   Substance and Sexual Activity    Alcohol use: No    Drug use: No       LABORATORY RESULTS:   No flowsheet data found. Lab Results   Component Value Date/Time    TSH 2.190 10/24/2018 11:26 AM    TSH 3.23 11/04/2015 11:29 AM       CURRENT MEDICATIONS:    Current Outpatient Medications:     guaiFENesin ER (MUCINEX) 600 mg ER tablet, Take 1 Tab by mouth daily. , Disp: 30 Tab, Rfl: 6    bisacodyl (DULCOLAX, BISACODYL,) 5 mg EC tablet, Take 5 mg by mouth daily as needed for Constipation. , Disp: , Rfl:     cyclobenzaprine (FLEXERIL) 10 mg tablet, TAKE ONE TABLET BY MOUTH THREE TIMES DAILY AS NEEDED FOR MUSCLE SPASM, Disp: 90 Tab, Rfl: 0    carvedilol (COREG) 6.25 mg tablet, Take 12.5 mg by mouth every other day., Disp: 60 Tab, Rfl: 12   sildenafil citrate (VIAGRA) 100 mg tablet, Take 1 Tab by mouth as needed. , Disp: 8 Tab, Rfl: 12    losartan (COZAAR) 25 mg tablet, Take  by mouth every other day., Disp: , Rfl:     calcium acetate (PHOSLO) 667 mg cap, Take 3 Caps by mouth three (3) times daily (with meals). , Disp: , Rfl:     aspirin delayed-release 81 mg tablet, Take 81 mg by mouth daily. , Disp: , Rfl:     acetaminophen (TYLENOL) 325 mg tablet, Take 2 Tabs by mouth every six (6) hours. Indications: PAIN, Disp: 60 Tab, Rfl: 0    albuterol (PROVENTIL HFA, VENTOLIN HFA, PROAIR HFA) 90 mcg/actuation inhaler, Take 2 Puffs by inhalation every four (4) hours as needed for Wheezing., Disp: 1 Inhaler, Rfl: 12      Thank you for your referral and allowing me to participate in this patient's care.     Min Bermudez MD PhD  [address] [place] [address], [address]  Phone: [phone]  Fax: [phone]

## 2019-05-02 LAB
ALBUMIN SERPL-MCNC: 3.3 G/DL (ref 3.5–5)
ANION GAP SERPL CALC-SCNC: 7 MMOL/L (ref 5–15)
BASOPHILS # BLD: 0 K/UL (ref 0–0.1)
BASOPHILS NFR BLD: 1 % (ref 0–1)
BUN SERPL-MCNC: 49 MG/DL (ref 6–20)
BUN/CREAT SERPL: 6 (ref 12–20)
CALCIUM SERPL-MCNC: 9.6 MG/DL (ref 8.5–10.1)
CHLORIDE SERPL-SCNC: 98 MMOL/L (ref 97–108)
CO2 SERPL-SCNC: 33 MMOL/L (ref 21–32)
CREAT SERPL-MCNC: 8.14 MG/DL (ref 0.7–1.3)
DIFFERENTIAL METHOD BLD: ABNORMAL
EOSINOPHIL # BLD: 0.5 K/UL (ref 0–0.4)
EOSINOPHIL NFR BLD: 7 % (ref 0–7)
ERYTHROCYTE [DISTWIDTH] IN BLOOD BY AUTOMATED COUNT: 12.4 % (ref 11.5–14.5)
GLUCOSE SERPL-MCNC: 89 MG/DL (ref 65–100)
HCT VFR BLD AUTO: 32.6 % (ref 36.6–50.3)
HGB BLD-MCNC: 9.9 G/DL (ref 12.1–17)
IMM GRANULOCYTES # BLD AUTO: 0 K/UL (ref 0–0.04)
IMM GRANULOCYTES NFR BLD AUTO: 0 % (ref 0–0.5)
LYMPHOCYTES # BLD: 1.3 K/UL (ref 0.8–3.5)
LYMPHOCYTES NFR BLD: 20 % (ref 12–49)
MCH RBC QN AUTO: 32.6 PG (ref 26–34)
MCHC RBC AUTO-ENTMCNC: 30.4 G/DL (ref 30–36.5)
MCV RBC AUTO: 107.2 FL (ref 80–99)
MONOCYTES # BLD: 0.7 K/UL (ref 0–1)
MONOCYTES NFR BLD: 10 % (ref 5–13)
NEUTS SEG # BLD: 4.1 K/UL (ref 1.8–8)
NEUTS SEG NFR BLD: 62 % (ref 32–75)
NRBC # BLD: 0 K/UL (ref 0–0.01)
NRBC BLD-RTO: 0 PER 100 WBC
PHOSPHATE SERPL-MCNC: 4.6 MG/DL (ref 2.6–4.7)
PLATELET # BLD AUTO: 236 K/UL (ref 150–400)
PMV BLD AUTO: 10.3 FL (ref 8.9–12.9)
POTASSIUM SERPL-SCNC: 3.3 MMOL/L (ref 3.5–5.1)
RBC # BLD AUTO: 3.04 M/UL (ref 4.1–5.7)
SODIUM SERPL-SCNC: 138 MMOL/L (ref 136–145)
WBC # BLD AUTO: 6.6 K/UL (ref 4.1–11.1)

## 2019-05-02 PROCEDURE — 97161 PT EVAL LOW COMPLEX 20 MIN: CPT

## 2019-05-02 PROCEDURE — 94640 AIRWAY INHALATION TREATMENT: CPT

## 2019-05-02 PROCEDURE — 74011250637 HC RX REV CODE- 250/637: Performed by: NURSE PRACTITIONER

## 2019-05-02 PROCEDURE — 65660000000 HC RM CCU STEPDOWN

## 2019-05-02 PROCEDURE — 36415 COLL VENOUS BLD VENIPUNCTURE: CPT

## 2019-05-02 PROCEDURE — 74011000258 HC RX REV CODE- 258: Performed by: INTERNAL MEDICINE

## 2019-05-02 PROCEDURE — 74011250637 HC RX REV CODE- 250/637: Performed by: INTERNAL MEDICINE

## 2019-05-02 PROCEDURE — 97116 GAIT TRAINING THERAPY: CPT

## 2019-05-02 PROCEDURE — 74011000250 HC RX REV CODE- 250: Performed by: INTERNAL MEDICINE

## 2019-05-02 PROCEDURE — 36600 WITHDRAWAL OF ARTERIAL BLOOD: CPT

## 2019-05-02 PROCEDURE — 74011250637 HC RX REV CODE- 250/637: Performed by: FAMILY MEDICINE

## 2019-05-02 PROCEDURE — 85025 COMPLETE CBC W/AUTO DIFF WBC: CPT

## 2019-05-02 PROCEDURE — 74011250636 HC RX REV CODE- 250/636: Performed by: FAMILY MEDICINE

## 2019-05-02 PROCEDURE — 74011250636 HC RX REV CODE- 250/636: Performed by: INTERNAL MEDICINE

## 2019-05-02 PROCEDURE — 80069 RENAL FUNCTION PANEL: CPT

## 2019-05-02 RX ORDER — HYDRALAZINE HYDROCHLORIDE 20 MG/ML
10 INJECTION INTRAMUSCULAR; INTRAVENOUS ONCE
Status: COMPLETED | OUTPATIENT
Start: 2019-05-02 | End: 2019-05-02

## 2019-05-02 RX ORDER — HEPARIN SODIUM 5000 [USP'U]/ML
5000 INJECTION, SOLUTION INTRAVENOUS; SUBCUTANEOUS EVERY 8 HOURS
Status: DISCONTINUED | OUTPATIENT
Start: 2019-05-02 | End: 2019-05-04 | Stop reason: HOSPADM

## 2019-05-02 RX ORDER — CALCIUM ACETATE 667 MG/1
2 TABLET ORAL
Status: DISCONTINUED | OUTPATIENT
Start: 2019-05-02 | End: 2019-05-04 | Stop reason: HOSPADM

## 2019-05-02 RX ORDER — BISACODYL 5 MG
5 TABLET, DELAYED RELEASE (ENTERIC COATED) ORAL DAILY PRN
Status: DISCONTINUED | OUTPATIENT
Start: 2019-05-02 | End: 2019-05-04 | Stop reason: HOSPADM

## 2019-05-02 RX ORDER — IPRATROPIUM BROMIDE AND ALBUTEROL SULFATE 2.5; .5 MG/3ML; MG/3ML
3 SOLUTION RESPIRATORY (INHALATION)
Status: DISCONTINUED | OUTPATIENT
Start: 2019-05-02 | End: 2019-05-03

## 2019-05-02 RX ORDER — CALCIUM ACETATE 667 MG/1
3 TABLET ORAL
Status: DISCONTINUED | OUTPATIENT
Start: 2019-05-02 | End: 2019-05-02

## 2019-05-02 RX ORDER — ACETAMINOPHEN 325 MG/1
650 TABLET ORAL
Status: DISCONTINUED | OUTPATIENT
Start: 2019-05-02 | End: 2019-05-04 | Stop reason: HOSPADM

## 2019-05-02 RX ADMIN — BISACODYL 5 MG: 5 TABLET, COATED ORAL at 18:14

## 2019-05-02 RX ADMIN — CALCIUM ACETATE 2001 MG: 667 TABLET ORAL at 12:05

## 2019-05-02 RX ADMIN — BUDESONIDE 500 MCG: 0.5 INHALANT RESPIRATORY (INHALATION) at 19:14

## 2019-05-02 RX ADMIN — CALCIUM ACETATE 1334 MG: 667 TABLET ORAL at 16:47

## 2019-05-02 RX ADMIN — GUAIFENESIN 600 MG: 600 TABLET, EXTENDED RELEASE ORAL at 08:23

## 2019-05-02 RX ADMIN — BUDESONIDE 500 MCG: 0.5 INHALANT RESPIRATORY (INHALATION) at 07:19

## 2019-05-02 RX ADMIN — HYDRALAZINE HYDROCHLORIDE 10 MG: 20 INJECTION INTRAMUSCULAR; INTRAVENOUS at 22:26

## 2019-05-02 RX ADMIN — LOSARTAN POTASSIUM 25 MG: 25 TABLET ORAL at 08:22

## 2019-05-02 RX ADMIN — ASPIRIN 81 MG: 81 TABLET ORAL at 08:23

## 2019-05-02 RX ADMIN — HEPARIN SODIUM 5000 UNITS: 5000 INJECTION INTRAVENOUS; SUBCUTANEOUS at 20:21

## 2019-05-02 RX ADMIN — ARFORMOTEROL TARTRATE 15 MCG: 15 SOLUTION RESPIRATORY (INHALATION) at 07:19

## 2019-05-02 RX ADMIN — IPRATROPIUM BROMIDE AND ALBUTEROL SULFATE 3 ML: .5; 3 SOLUTION RESPIRATORY (INHALATION) at 16:00

## 2019-05-02 RX ADMIN — CEFEPIME HYDROCHLORIDE 1 G: 1 INJECTION, POWDER, FOR SOLUTION INTRAMUSCULAR; INTRAVENOUS at 18:14

## 2019-05-02 RX ADMIN — IPRATROPIUM BROMIDE AND ALBUTEROL SULFATE 3 ML: .5; 3 SOLUTION RESPIRATORY (INHALATION) at 19:14

## 2019-05-02 RX ADMIN — NITROGLYCERIN 1 INCH: 20 OINTMENT TOPICAL at 20:21

## 2019-05-02 RX ADMIN — CARVEDILOL 12.5 MG: 12.5 TABLET, FILM COATED ORAL at 08:23

## 2019-05-02 NOTE — PROGRESS NOTES
Hospitalist Progress Note  Devan Carroll MD  Answering service: [phone] from in house phone      Date of Service:  2019  NAME:  Maribeth Hurtado  :  1955  MRN:  [id number]    Admission Summary:   63M direct admit from advanced HF clinic. Was seen there for LEE- not cardiac, CT showed PNA  Interval history / Subjective:   Patient seen and examined at bedside, feels well, no acute events, starting to get better, still has cough, some wheeze/sob     Assessment & Plan:     #. CAP: multifocal  - broad spectrum abx, guaifenesin, duoNebs PRN, Oxygen prn, lozenges, incentive spirometry  - monitor inflammatory markers, if spikes fever send BCs. Sputum culture if possible  - PT/OT eval.    #. ESRD: HD- MWF, Nephrology foloowing  #. Pulm HTN- severe  #. Diastolic CHF: TTE ef 68%. #. Chronic Anemia: likely related to ESRD, on epogen, monitor  #. HTN: chronic, stable, Home regimen, Monitor  #. Morbid obesity: counseled. Code status: Full  DVT prophylaxis: heparin  Care Plan discussed with: Patient/Family and Nurse  Disposition: TBD     Hospital Problems  Date Reviewed: 2018          Codes Class Noted POA    Dyspnea ICD-10-CM: R06.00  ICD-9-CM: 786.09  2019 Yes        * (Principal) CAP (community acquired pneumonia) ICD-10-CM: J18.9  ICD-9-CM: 104  2019 Yes        Essential hypertension ICD-10-CM: I10  ICD-9-CM: 401.9  2011 Yes            Review of Systems:   Pertinent items are mentioned in interval history. Vital Signs:    Last 24hrs VS reviewed since prior progress note.  Most recent are:  Visit Vitals  /77 (BP 1 Location: Right arm, BP Patient Position: Sitting)   Pulse 82   Temp 97.6 °F (36.4 °C)   Resp 18   Wt 80 kg (176 lb 5.9 oz)   SpO2 100%   BMI 27.62 kg/m²         Intake/Output Summary (Last 24 hours) at 2019 1136  Last data filed at 2019 0326  Gross per 24 hour   Intake 290 ml   Output --   Net 290 ml        Physical Examination:   General:  Alert, oriented, No acute distress  Resp:  No accessory muscle use, fair AE, mild wheezes, few rhonchi R>L  Abd:  Soft, non-tender, non-distended, BS+  Extremities:  No cyanosis or clubbing, no significant edema  Neuro:  Grossly normal, no focal neuro deficits, follows commands   Psych:  Good insight, AAOx3, not agitated. Data Review:    Review and/or order of clinical lab test  Review and/or order of tests in the radiology section of CPT  Review and/or order of tests in the medicine section of CPT  Labs:   No results for input(s): WBC, HGB, HCT, PLT, HGBEXT, HCTEXT, PLTEXT in the last 72 hours. No results for input(s): NA, K, CL, CO2, BUN, CREA, GLU, CA, MG, PHOS, URICA in the last 72 hours. No results for input(s): SGOT, GPT, ALT, AP, TBIL, TBILI, TP, ALB, GLOB, GGT, AML, LPSE in the last 72 hours. No lab exists for component: AMYP, HLPSE  No results for input(s): INR, PTP, APTT in the last 72 hours. No lab exists for component: INREXT   No results for input(s): FE, TIBC, PSAT, FERR in the last 72 hours. Lab Results   Component Value Date/Time    Folate 17.8 11/13/2018 12:00 AM      No results for input(s): PH, PCO2, PO2 in the last 72 hours. No results for input(s): CPK, CKNDX, TROIQ in the last 72 hours.     No lab exists for component: CPKMB  No results found for: CHOL, CHOLX, CHLST, CHOLV, HDL, LDL, LDLC, DLDLP, TGLX, TRIGL, TRIGP, CHHD, CHHDX  Lab Results   Component Value Date/Time    Glucose (POC) 78 10/11/2016 07:27 AM    Glucose (POC) 97 03/08/2016 07:03 AM    Glucose (POC) 105 (H) 11/04/2015 11:20 AM    Glucose (POC) 78 10/13/2015 08:40 AM    Glucose (POC) 84 09/12/2012 09:47 AM    Glucose  12/24/2012 08:36 AM     No results found for: COLOR, APPRN, SPGRU, REFSG, JOSE, PROTU, GLUCU, KETU, BILU, UROU, HAYDE, LEUKU, GLUKE, EPSU, BACTU, WBCU, RBCU, CASTS, UCRY  Medications Reviewed:     Current Facility-Administered Medications   Medication Dose Route Frequency    aspirin delayed-release tablet 81 mg  81 mg Oral DAILY    carvedilol (COREG) tablet 12.5 mg  12.5 mg Oral EVERY OTHER DAY    guaiFENesin ER (MUCINEX) tablet 600 mg  600 mg Oral DAILY    losartan (COZAAR) tablet 25 mg  25 mg Oral EVERY OTHER DAY    arformoterol (BROVANA) neb solution 15 mcg  15 mcg Nebulization BID RT    And    budesonide (PULMICORT) 500 mcg/2 ml nebulizer suspension  500 mcg Nebulization BID RT    albuterol (PROVENTIL VENTOLIN) nebulizer solution 2.5 mg  2.5 mg Nebulization Q4H PRN    [START ON 5/3/2019] levoFLOXacin (LEVAQUIN) 500 mg in D5W IVPB  500 mg IntraVENous Q48H    albuterol-ipratropium (DUO-NEB) 2.5 MG-0.5 MG/3 ML  3 mL Nebulization Q4H PRN    benzocaine-menthol (CEPACOL) lozenge 1 Lozenge  1 Lozenge Mucous Membrane PRN    cefepime (MAXIPIME) 1 g in 0.9% sodium chloride (MBP/ADV) 50 mL  1 g IntraVENous Q24H   ______________________________________________________________________  EXPECTED LENGTH OF STAY: 4d 4h  ACTUAL LENGTH OF STAY:          1               Raffaele Hairston MD

## 2019-05-02 NOTE — PROGRESS NOTES
Problem: Mobility Impaired (Adult and Pediatric)  Goal: *Acute Goals and Plan of Care (Insert Text)  Description  Physical Therapy Goals  Initiated 5/2/2019    1. Patient will ambulate with modified independence for >350 feet with O2 sat >89% within 7 day(s). 2.  Patient will ascend/descend 12-14 stairs with handrail(s) with modified independence within 7 day(s). Outcome: Not Met    PHYSICAL THERAPY EVALUATION  Patient: Gregory Briggs (47 y.o. male)  Date: 5/2/2019  Primary Diagnosis: Dyspnea [R06.00]        Precautions:        ASSESSMENT :   Based on the objective data described below, patient presents with Independent/ Modified independent overall for functional mobility. Gait training completed at Supervision level to monitor vitals, 400 feet with no device. 6MWT completed ambulating 362 ft with one standing rest break. Patient voiced no SOB after, no pain, conversational throughout. Physical therapy planning to see tomorrow to clear on steps (has a full flight to get into his apartment). The following are barriers to independence while in acute care:   -Cognitive and/or behavioral: none   -Medical condition: cardiopulmonary tolerance and medical history    -Other:       The patient will benefit from skilled acute intervention to address the above impairments and their rehabilitation potential is considered to be Good    Discharge recommendations: None anticipated. Patient's barriers to discharging home, in addition to above impairments: entry and exit into the home. Equipment recommendations for successful discharge (if) home: none        PLAN :  Recommendations and Planned Interventions: gait training and patient and family training/education      Frequency/Duration: Patient will be followed by physical therapy  5 times a week to address goals (anticipate only one additional visit to clear steps and reassess SpO2 with activity)     SUBJECTIVE:   Patient stated ? I'm doing good.?    OBJECTIVE DATA SUMMARY:   HISTORY:    Past Medical History:   Diagnosis Date    Adverse effect of anesthesia     \"MY BLOOD PRESSURE DROPS ,\"    Alzheimer's disease 2/18/2011    PT DENIES    Chronic kidney disease     KIDNEYS FAILED R/T HTN    Chronic pain     LEFT HIP PAIN    Heart failure ([facility] [address] 6/2015    CHF    Hemodialysis patient ([facility] [address]     M-W-F    Osteoarthrosis, unspecified whether generalized or localized, unspecified site 2/18/2011    Other unknown and unspecified cause of morbidity or mortality     PT DOSES OFF FREQUENTLY    PUD (peptic ulcer disease)     Renal Disease 2/18/2011    Right inguinal hernia 3/1/2016    Unspecified asthma(493.90) 2/18/2011    Unspecified essential hypertension 2/18/2011    Ventral incisional hernia 3/1/2016     Past Surgical History:   Procedure Laterality Date    CARDIAC SURG PROCEDURE UNLIST  4/29/15    CARDIAC CATHGERIZATION    HX GI      COLONOSCOPY    HX HEENT      TONSILLECTOMY    HX HERNIA REPAIR  3/8/16    Repair of right inguinal hernia with mesh,Repair of ventral incisional hernia with mesh    HX ORTHOPAEDIC Right 10/13/15     THR    HX ORTHOPAEDIC      left hip    HX OTHER SURGICAL  10/11/2016    revision of AV fistula left arm with repair of pseudoaneurysm    HX SMALL BOWEL RESECTION      HX UROLOGICAL Bilateral 2005    REMOVAL OF KIDNEYS    HX VASCULAR ACCESS      LT ARM WITH PORTS FOR DYALISIS     Prior Level of Function/Home Situation: Independent in the community with no device. Employed as a .       Home Situation  Home Environment: Apartment  # Steps to Enter: 14  Rails to Enter: Yes  Wheelchair Ramp: No  One/Two Story Residence: One story  Living Alone: No  Support Systems: Spouse/Significant Other/Partner  Patient Expects to be Discharged to[de-identified] Apartment  Current DME Used/Available at Home: None  Tub or Shower Type: Tub/Shower combination    EXAMINATION/PRESENTATION/DECISION MAKING:   Critical Behavior:  Neurologic State: Alert  Orientation Level: Oriented X4  Cognition: Appropriate decision making  Safety/Judgement: Awareness of environment  Hearing: Auditory  Auditory Impairment: None    Range Of Motion:  AROM: Within functional limits(BLE)                       Strength:    Strength: Within functional limits(BLE)                    Tone & Sensation:   Tone: Normal              Sensation: Intact(light touch BLE)               Coordination:  Coordination: Within functional limits  Vision:      Functional Mobility:  Bed Mobility:  Rolling: Independent  Supine to Sit: Modified independent(HOB partially elevated)  Sit to Supine: Modified independent(HOB partially elevated)  Scooting: Independent  Transfers:  Sit to Stand: Independent  Stand to Sit: Independent  Balance:   Sitting: Intact; Without support  Standing: Intact; Without support  Ambulation/Gait Training:  Distance (ft): 362 Feet (ft)  Assistive Device: Other (comment);Gait belt(no AD)  Ambulation - Level of Assistance: Supervision;Assist x1  Gait Description (WDL): Exceptions to WDL  Gait Abnormalities: Other(One episode of unsteadiness when distracted - self corrected)  Speed/Sharon: Slow         Functional Measure:  6 MWT results: (Specify if any supplemental oxygen is used, the type, pre, during and post sats.)  Distance Walked in Feet (ft): 362 ft.(one standing rest break)  Kristine Rating of Perceived exertion (0-10 point scale):  not assessed w/ KRISTINE though patient voiced no SOB pre or post 6MWT   Pre Heart Rate: 82    Pre O2 Saturation: 90            Post O2 Saturation: 89 (room monitor;  pt voiced no SOB and none observed )    Assistive device used: Assistive Device: Other (comment);Gait belt(no AD)        Normative data:   Men 39-80 years old = 1889 feet;  Women 3680 years jvg=9665 feet  Modified 10 point Kristine RPE scale utilized:  0 = no breathlessness at all ---> 10 = maximum exertion  Please refer to the flowsheet for any additional vital signs taken during this treatment. Physical Therapy Evaluation Charge Determination   History Examination Presentation Decision-Making   MEDIUM  Complexity : 1-2 comorbidities / personal factors will impact the outcome/ POC  LOW Complexity : 1-2 Standardized tests and measures addressing body structure, function, activity limitation and / or participation in recreation  LOW Complexity : Stable, uncomplicated  LOW Complexity : FOTO score of       Based on the above components, the patient evaluation is determined to be of the following complexity level: LOW     Pain:  Pre treatment: 0 /10   During treatment: 0/10  Post treatment:  0/10     Activity Tolerance:   Fair  Please refer to the flowsheet for vital signs taken during this treatment. After treatment patient left:   Supine in bed  Call light within reach  RN notified     COMMUNICATION/EDUCATION:   The patient?s plan of care was discussed with: Physical Therapist and Registered Nurse. Fall prevention education was provided and the patient/caregiver indicated understanding., Patient/family have participated as able in goal setting and plan of care. and Patient/family agree to work toward stated goals and plan of care.     Thank you for this referral.  Yolande Osgood, PT   Time Calculation: 22 mins

## 2019-05-02 NOTE — PROGRESS NOTES
Transitional Care Team: Initial VARUNG Note    Date of Assessment: 05/02/19  Time of Assessment:  11:34 AM    Minal Cortes is a 70 y.o. male inpatient at [address] [place] [address], 3.3 today. Attempt to talk about AMD      Primary Diagnosis: CAP, ESRD, dHF    Advance Directive:  not on file. Current Code Status:  Full Code    Referral to Hospice/ Palliative Care Appropriate: no.    Awareness of Medical Conditions: (Trajectory of illness and pts expectations). ESRD - MWF dialysis. Followed by Dr Felipa Srinivasan. Also with dHF followed by [facility]. Discharge Needs: (to include safety issues) depending on how he does with therapy and his LEE    Barriers Identified: pt still able to drive and transports himself back and forth to HD ([facility]). With the recent inc LEE, he was having more difficulty driving himself. He is iADLs. Patient is willing to go to SNF/Inpatient Rehab if recommended: will follow along with how he does with PT and his O2 sats    Medication Review:  reviewed some on his PTA meds. Can patient afford medications:  no.  Pt was going to have to pay $400 deductible for Advair, so he never got it. Patient is Compliant with Medication regimen:  Yes    Who manages medications at home: Patient    Best Patient Contact Number: [phone], wife Rodolfo at [phone]       HUG (Healthy Understanding of Goals) program introduced to patient/family. The Transitional Care Team bridges the gaps in care and education surrounding discharge from the acute care facility. The objective is to empower the patient and family in taking a proactive role in preventing readmission within the first thirty days after discharge. The team is also involved in the efforts to reduce readmission to the acute care setting after stabilization and discharge from the acute care environment either to skilled nursing facilities or community.      MOHSEN RN/NP will return with MOHSEN Calendar/ follow up appointments/ Ambulatory Nurse Navigator name and contact information when the patient is ready for discharge. Future Appointments   Date Time Provider Gerardo Jarquini   5/7/2019  7:00 AM [facility] CT MAIN 1 [facility] . [facility]        Patient education focused on readmission zones as described as: The Red Zone: High risk for readmission, days 1-21  The Yellow Zone: Moderate  risk for readmission, days 22-29   The Green Zone: Lower risk for readmission, days                30 and after    The [facility] Team will attempt to follow the patient from a distance while inpatient as well as be available for further transition/disposition needs. The [facility] team will continue to offer support during the 30- 90 day discharge from acute care setting. Will notify Ambulatory [facility] Nurse Navigator, Lima Hartley RN.     Past Medical History:   Diagnosis Date    Adverse effect of anesthesia     \"MY BLOOD PRESSURE DROPS ,\"    Alzheimer's disease 2/18/2011    PT DENIES    Chronic kidney disease     KIDNEYS FAILED R/T HTN    Chronic pain     LEFT HIP PAIN    Heart failure ([facility] [address] 6/2015    CHF    Hemodialysis patient [facility])     M-W-F    Osteoarthrosis, unspecified whether generalized or localized, unspecified site 2/18/2011    Other unknown and unspecified cause of morbidity or mortality     PT DOSES OFF FREQUENTLY    PUD (peptic ulcer disease)     Renal Disease 2/18/2011    Right inguinal hernia 3/1/2016    Unspecified asthma(493.90) 2/18/2011    Unspecified essential hypertension 2/18/2011    Ventral incisional hernia 3/1/2016

## 2019-05-02 NOTE — PROGRESS NOTES
Occupational Therapy  05/02/19    Orders acknowledged, chart reviewed. Patient is IND at baseline, admitted for dyspnea & coughing, close to baseline at this time. Observed ambulating in hallway with PT with IND-Mod I, good balance, patient reporting no self-care deficits (bathed himself this morning). Skilled OT evaluation not appropriate at this time, please re-consult if a decline in functional status occurs.      Thank you  Ace Relic, OTD, OTR/L

## 2019-05-02 NOTE — CONSULTS
[facility]   [address] [address], [facility] [address], [place]  Phone: [phone]   [phone]       Nephrology Progress Note  Varsha Parikh     1955     [id number]  Date of Admission : 5/1/2019 05/02/19    CC:  Follow up for ESRD       Assessment and Plan   ESRD- HD :  - dialyzes MWF at [facility]   - Next HD tomorrow    Exertional Dyspnea  - per primary team     Pulm HTN - severe  Diastolic CHF  Echo w/ LVEF 45-50%     Anemia in CKD   - Ordered labs   - No labs done on admission   - resume Epogen if Hb < 10     Sec HPTH   - continue Binders     HTN   - controlled   - continue home meds        Interval History:  Mr Ina Oakes presented to ER after dialysis due to worsening exertional dyspnea   On admission , he had Chest CT which showed B/L pulm ground glass opacities of unknown etiology   We were asked to see him for ESRD management   - ESRD 2/2 Nephrosclerosis ESRD after B/L Nephrectomy for ? RCC at [facility] in 2005   - Started on HD in 11/2005 by Dr Brittani Sutherland (L) AVF  Denies having any issues w/ dialysis and no issues reaching his dry weight   Denies any fevers/ chills/ rigors   Has RUE AVF for access      Review of Systems: A comprehensive review of systems was negative.     Current Medications:   Current Facility-Administered Medications   Medication Dose Route Frequency    aspirin delayed-release tablet 81 mg  81 mg Oral DAILY    carvedilol (COREG) tablet 12.5 mg  12.5 mg Oral EVERY OTHER DAY    guaiFENesin ER (MUCINEX) tablet 600 mg  600 mg Oral DAILY    losartan (COZAAR) tablet 25 mg  25 mg Oral EVERY OTHER DAY    arformoterol (BROVANA) neb solution 15 mcg  15 mcg Nebulization BID RT    And    budesonide (PULMICORT) 500 mcg/2 ml nebulizer suspension  500 mcg Nebulization BID RT    albuterol (PROVENTIL VENTOLIN) nebulizer solution 2.5 mg  2.5 mg Nebulization Q4H PRN    [START ON 5/3/2019] levoFLOXacin (LEVAQUIN) 500 mg in D5W IVPB  500 mg IntraVENous Q48H    albuterol-ipratropium (DUO-NEB) 2.5 MG-0.5 MG/3 ML  3 mL Nebulization Q4H PRN    benzocaine-menthol (CEPACOL) lozenge 1 Lozenge  1 Lozenge Mucous Membrane PRN    cefepime (MAXIPIME) 1 g in 0.9% sodium chloride (MBP/ADV) 50 mL  1 g IntraVENous Q24H      Allergies   Allergen Reactions    Milk Anaphylaxis    Shellfish Derived Anaphylaxis    Benadryl [Diphenhydramine Hcl] Nausea and Vomiting    Zofran Odt [Ondansetron] Nausea and Vomiting    Iodinated Contrast- Oral And Iv Dye Other (comments)    Peanut Shortness of Breath    Penicillins Unknown (comments)       Objective:  Vitals:    Vitals:    05/02/19 0326 05/02/19 0615 05/02/19 0720 05/02/19 0754   BP: 167/84   165/88   Pulse: 91   89   Resp: 16   18   Temp: 97.9 °F (36.6 °C)   98.3 °F (36.8 °C)   SpO2: 96%  95% 97%   Weight:  80 kg (176 lb 5.9 oz)       Intake and Output:  No intake/output data recorded. 04/30 1901 - 05/02 0700  In: 290 [P.O.:240; I.V.:50]  Out: -     Physical Examination:    General: NAD,Conversant  Neck:  Supple, no mass  Resp:  Lungs CTA B/L, no wheezing , normal respiratory effort  CV:  RRR,  no murmur or rub,no LE edema  GI:  Soft, NT, + Bowel sounds, no hepatosplenomegaly  Neurologic:  Non focal  Psych:             AAO x 3 appropriate affect   Skin:  No Rash  Ext                   lue avf +THRILL     []    High complexity decision making was performed  []    Patient is at high-risk of decompensation with multiple organ involvement    Lab Data Personally Reviewed: I have reviewed all the pertinent labs, microbiology data and radiology studies during assessment. No results for input(s): NA, K, CL, CO2, GLU, BUN, CREA, CA, MG, PHOS, ALB, TBIL, SGOT, ALT, INR in the last 72 hours. No lab exists for component: INREXT  No results for input(s): WBC, HGB, HCT, PLT, HGBEXT, HCTEXT, PLTEXT in the last 72 hours.   No results found for: SDES  Lab Results   Component Value Date/Time    Culture result: PENDING 05/01/2019 01:46 PM    Culture result:  09/24/2015 12:00 PM     MRSA NOT PRESENT. Apparent Staphylococcus aureus(not MRSA noted.)    Culture result:  09/24/2015 12:00 PM         Screening of patient nares for MRSA is for surveillance purposes and, if positive, to facilitate isolation considerations in high risk settings. It is not intended for automatic decolonization interventions per se as regimens are not sufficiently effective to warrant routine use. Recent Results (from the past 24 hour(s))   CULTURE, RESPIRATORY/SPUTUM/BRONCH W GRAM STAIN    Collection Time: 05/01/19  1:46 PM   Result Value Ref Range    Special Requests: NO SPECIAL REQUESTS      GRAM STAIN FEW WBCS SEEN      GRAM STAIN OCCASIONAL GRAM POSITIVE COCCI      Culture result: PENDING            Total time spent with patient:  xxx   min. Care Plan discussed with:  Patient     Family      RN      Consulting Physician [facility],         I have reviewed the flowsheets. Chart and Pertinent Notes have been reviewed. No change in PMH ,family and social history from Consult note.       David Johnson MD

## 2019-05-02 NOTE — PROGRESS NOTES
1923: Bedside shift change report received by by Mumtaz Fritz (offgoing nurse). Report included the following information SBAR, Kardex, Procedure Summary, Intake/Output, MAR, Recent Results and Cardiac Rhythm SR .     0730: Bedside shift change report given to Mumtaz Fritz (oncoming nurse). Report included the following information SBAR, Kardex, Intake/Output, MAR, Recent Results and Cardiac Rhythm SR. Care Plan 2008  Problem: Falls - Risk of  Goal: *Absence of Falls  Description  Document Aletha Barney Fall Risk and appropriate interventions in the flowsheet. Outcome: Progressing Towards Goal  Note:   Fall Risk Interventions:  Mobility Interventions: Communicate number of staff needed for ambulation/transfer         Medication Interventions: Evaluate medications/consider consulting pharmacy, Teach patient to arise slowly                   Problem: Pneumonia: Day 1  Goal: Activity/Safety  Outcome: Progressing Towards Goal  Note:   Up ad jose  Goal: Diagnostic Test/Procedures  Outcome: Progressing Towards Goal  Note:   PA and lat done  Goal: Nutrition/Diet  Outcome: Progressing Towards Goal  Goal: Medications  Outcome: Progressing Towards Goal  Note:   See MAR. Goal: Respiratory  Outcome: Progressing Towards Goal  Note:   On room air. Expiratory wheezing auscultated on exam. No S/S of distress. Goal: Treatments/Interventions/Procedures  Outcome: Progressing Towards Goal  Note:   IV ABX  Goal: Psychosocial  Outcome: Progressing Towards Goal  Note:   Calm, cooperative. Goal: *Oxygen saturation within defined limits  Outcome: Progressing Towards Goal  Note:   See flowsheets  Goal: *Hemodynamically stable  Outcome: Progressing Towards Goal  Note:   Vital signs stable.    Goal: *Demonstrates progressive activity  Outcome: Progressing Towards Goal

## 2019-05-02 NOTE — PROGRESS NOTES
1930: Bedside shift change report received by Randell Forman (offgoing nurse). Report included the following information SBAR, Kardex, Procedure Summary, Intake/Output, MAR, Recent Results and Cardiac Rhythm SR .     1951: Updated Dr. Jordan Retort to pt's BP. MD verbalized understanding. States that he will enter orders in through the computer. 2139: Post nitro paste BP remains elevated; see flow sheet. Paged Dr. Jordan Retort. Awaiting call back. 2202: No response from MD. Repaged. Awaiting call back. 2217: MD responded to page. Updated to BP. MD verbalized understanding. States that he is putting orders in for IV hydralazine. 2337: Pt's BP remains elevated after hydralazine but pt states that he does not want any additional BP meds. Paged Dr. Jordan Retort to update. Awaiting call back. 2353: Updated Dr. Jordan Retort to above information. MD verbalized understanding. MD would like noted on the chart that he would like to order an additional 10 mg of hydralazine but that the pt is refusing at this time. 0730: Bedside shift change report given to [facility] (oncoming nurse). Report included the following information SBAR, Kardex, Intake/Output, MAR, Recent Results and Cardiac Rhythm SR. Care Plan 1948  Problem: Falls - Risk of  Goal: *Absence of Falls  Description  Document [facility] Fall Risk and appropriate interventions in the flowsheet.   Outcome: Progressing Towards Goal  Note:   Fall Risk Interventions:  Mobility Interventions: Communicate number of staff needed for ambulation/transfer, OT consult for ADLs, Patient to call before getting OOB, PT Consult for mobility concerns         Medication Interventions: Evaluate medications/consider consulting pharmacy, Patient to call before getting OOB, Teach patient to arise slowly                   Problem: Pneumonia: Day 2  Goal: Activity/Safety  Outcome: Progressing Towards Goal  Note:   Worked with therapy today per day shift RN  Goal: Nutrition/Diet  Outcome: Progressing Towards Goal  Note:   Tolerating  Goal: Discharge Planning  Outcome: Progressing Towards Goal  Note:   Possible dc tomorrow p dialysis per day shift RN  Goal: Medications  Outcome: Progressing Towards Goal  Note:   See MAR. Goal: Respiratory  Outcome: Progressing Towards Goal  Note:   Expiratory wheezing upon auscultation. O2 sats WNL. No S/S of distress. Breathing treatments.    Goal: Treatments/Interventions/Procedures  Outcome: Progressing Towards Goal  Note:   IV ABX, breathing treatments  Goal: Psychosocial  Outcome: Progressing Towards Goal  Note:   Calm, cooperative  Goal: *Oxygen saturation within defined limits  Outcome: Progressing Towards Goal  Goal: *Hemodynamically stable  Outcome: Progressing Towards Goal  Note:   BP elevated but VS are otherwise stable  Goal: *Demonstrates progressive activity  Outcome: Progressing Towards Goal  Goal: *Tolerating diet  Outcome: Progressing Towards Goal  Goal: *Optimal pain control at patient's stated goal  Outcome: Progressing Towards Goal  Note:   No c/o pain at present

## 2019-05-02 NOTE — PROGRESS NOTES
Physical Therapy  5/2/2019    Patient completed stair training - ascended/descended x 9 steps with handrail support, step-over-step pattern, appropriate safety awareness, and modified independence. Patient has no further skilled acute PT needs. Will complete orders. Thank you.   Ming Newell, PT, DPT    Time Spent: 9 minutes

## 2019-05-02 NOTE — PROGRESS NOTES
Reason for Admission: Patient presented with pneumonia, also has a history of CHF followed by AHF Team                    RRAT Score:  14                Do you (patient/family) have any concerns for transition/discharge? None identified at this time                 Plan for utilizing home health: Patient would benefit from [facility] for pneumonia- patient is agreeable. CM will send referral to [facility] for [facility] when orders available- will need [facility] for pneumonia orders. The patient also has PT evaluation pending       Current Advanced Directive/Advance Care Plan:  Not on File    Likelihood of readmission? Moderate            Transition of Care Plan:  Anticipate home with [facility] or home health services pending PT evaluation    The CM met with patient and spouse at bedside in order to introduce the role of CM and assess for patient needs. The patient lives at home with his wife- verified demographics and insurance information. The patient also endorses he has Part D coverage through [employer] made copy of card- original card given back to patient, and faxed Part D card coverage to [employer] (f: [phone]) to be scanned into system. The patient endorses being independent with ADLs and mobility- denied use of DME in the home. The patient goes to outpatient Dialysis M/W/F at [facility] ([phone], f: [phone]). The patient denied any current home health services in place- however, stated someone already came to talk to him about home health coming in 1x/month? CM discussed H2H and patient is agreeable for [facility] for pneumonia- CM will send referral once orders are available. Patient also has PT evaluation pending. CM called [facility]- spoke with Jose A Godoy, she confirmed the patient is a M/W/F patient with a chair time of 6:00 a.m. CM will keep Jane updated upon discharge- the patient drives himself to and from dialysis appointments. CM will continue to follow for transitions of care.  VINI Morelos    Care Management Interventions  PCP Verified by CM: Yes(Patient verified PCP as Dr. Nicky Manzanares )  Mode of Transport at Discharge:  Other (see comment)(Family will provide transportation home upon discharge)  Transition of Care Consult (CM Consult): Discharge Planning  MyChart Signup: No  Discharge Durable Medical Equipment: No  Health Maintenance Reviewed: Yes  Physical Therapy Consult: Yes  Occupational Therapy Consult: No  Speech Therapy Consult: No  Current Support Network: Lives with Spouse, Own Home  Confirm Follow Up Transport: Family  Plan discussed with Pt/Family/Caregiver: Yes   Resource Information Provided?: No  Discharge Location  Discharge Placement: Home(PT evaluation pending )

## 2019-05-03 LAB
ANION GAP SERPL CALC-SCNC: 12 MMOL/L (ref 5–15)
BACTERIA SPEC CULT: NORMAL
BASOPHILS # BLD: 0.1 K/UL (ref 0–0.1)
BASOPHILS NFR BLD: 1 % (ref 0–1)
BUN SERPL-MCNC: 58 MG/DL (ref 6–20)
BUN/CREAT SERPL: 6 (ref 12–20)
CALCIUM SERPL-MCNC: 9.9 MG/DL (ref 8.5–10.1)
CHLORIDE SERPL-SCNC: 97 MMOL/L (ref 97–108)
CO2 SERPL-SCNC: 27 MMOL/L (ref 21–32)
CREAT SERPL-MCNC: 8.99 MG/DL (ref 0.7–1.3)
CRP SERPL-MCNC: 1.51 MG/DL (ref 0–0.6)
DIFFERENTIAL METHOD BLD: ABNORMAL
EOSINOPHIL # BLD: 0.6 K/UL (ref 0–0.4)
EOSINOPHIL NFR BLD: 8 % (ref 0–7)
ERYTHROCYTE [DISTWIDTH] IN BLOOD BY AUTOMATED COUNT: 12.7 % (ref 11.5–14.5)
GLUCOSE SERPL-MCNC: 99 MG/DL (ref 65–100)
GRAM STN SPEC: NORMAL
GRAM STN SPEC: NORMAL
HCT VFR BLD AUTO: 32.6 % (ref 36.6–50.3)
HGB BLD-MCNC: 10 G/DL (ref 12.1–17)
IMM GRANULOCYTES # BLD AUTO: 0.1 K/UL (ref 0–0.04)
IMM GRANULOCYTES NFR BLD AUTO: 1 % (ref 0–0.5)
LYMPHOCYTES # BLD: 1.9 K/UL (ref 0.8–3.5)
LYMPHOCYTES NFR BLD: 24 % (ref 12–49)
MCH RBC QN AUTO: 32.2 PG (ref 26–34)
MCHC RBC AUTO-ENTMCNC: 30.7 G/DL (ref 30–36.5)
MCV RBC AUTO: 104.8 FL (ref 80–99)
MONOCYTES # BLD: 0.8 K/UL (ref 0–1)
MONOCYTES NFR BLD: 10 % (ref 5–13)
NEUTS SEG # BLD: 4.6 K/UL (ref 1.8–8)
NEUTS SEG NFR BLD: 56 % (ref 32–75)
NRBC # BLD: 0 K/UL (ref 0–0.01)
NRBC BLD-RTO: 0 PER 100 WBC
PLATELET # BLD AUTO: 297 K/UL (ref 150–400)
PMV BLD AUTO: 10.6 FL (ref 8.9–12.9)
POTASSIUM SERPL-SCNC: 3.2 MMOL/L (ref 3.5–5.1)
RBC # BLD AUTO: 3.11 M/UL (ref 4.1–5.7)
SERVICE CMNT-IMP: NORMAL
SODIUM SERPL-SCNC: 136 MMOL/L (ref 136–145)
WBC # BLD AUTO: 8.1 K/UL (ref 4.1–11.1)

## 2019-05-03 PROCEDURE — 74011000250 HC RX REV CODE- 250: Performed by: INTERNAL MEDICINE

## 2019-05-03 PROCEDURE — 36415 COLL VENOUS BLD VENIPUNCTURE: CPT

## 2019-05-03 PROCEDURE — 65660000000 HC RM CCU STEPDOWN

## 2019-05-03 PROCEDURE — 94664 DEMO&/EVAL PT USE INHALER: CPT

## 2019-05-03 PROCEDURE — 74011250637 HC RX REV CODE- 250/637: Performed by: INTERNAL MEDICINE

## 2019-05-03 PROCEDURE — 77010033678 HC OXYGEN DAILY

## 2019-05-03 PROCEDURE — 74011250636 HC RX REV CODE- 250/636: Performed by: INTERNAL MEDICINE

## 2019-05-03 PROCEDURE — 94640 AIRWAY INHALATION TREATMENT: CPT

## 2019-05-03 PROCEDURE — 86140 C-REACTIVE PROTEIN: CPT

## 2019-05-03 PROCEDURE — 80048 BASIC METABOLIC PNL TOTAL CA: CPT

## 2019-05-03 PROCEDURE — 74011250637 HC RX REV CODE- 250/637: Performed by: NURSE PRACTITIONER

## 2019-05-03 PROCEDURE — 85025 COMPLETE CBC W/AUTO DIFF WBC: CPT

## 2019-05-03 RX ORDER — POTASSIUM CHLORIDE 750 MG/1
40 TABLET, FILM COATED, EXTENDED RELEASE ORAL EVERY 4 HOURS
Status: COMPLETED | OUTPATIENT
Start: 2019-05-03 | End: 2019-05-03

## 2019-05-03 RX ORDER — IPRATROPIUM BROMIDE AND ALBUTEROL SULFATE 2.5; .5 MG/3ML; MG/3ML
3 SOLUTION RESPIRATORY (INHALATION)
Status: DISCONTINUED | OUTPATIENT
Start: 2019-05-03 | End: 2019-05-04 | Stop reason: HOSPADM

## 2019-05-03 RX ADMIN — BUDESONIDE 500 MCG: 0.5 INHALANT RESPIRATORY (INHALATION) at 08:01

## 2019-05-03 RX ADMIN — HEPARIN SODIUM 5000 UNITS: 5000 INJECTION INTRAVENOUS; SUBCUTANEOUS at 13:27

## 2019-05-03 RX ADMIN — GUAIFENESIN 600 MG: 600 TABLET, EXTENDED RELEASE ORAL at 08:11

## 2019-05-03 RX ADMIN — BUDESONIDE 500 MCG: 0.5 INHALANT RESPIRATORY (INHALATION) at 19:45

## 2019-05-03 RX ADMIN — HEPARIN SODIUM 5000 UNITS: 5000 INJECTION INTRAVENOUS; SUBCUTANEOUS at 03:22

## 2019-05-03 RX ADMIN — ASPIRIN 81 MG: 81 TABLET ORAL at 08:11

## 2019-05-03 RX ADMIN — ARFORMOTEROL TARTRATE 15 MCG: 15 SOLUTION RESPIRATORY (INHALATION) at 08:01

## 2019-05-03 RX ADMIN — LEVOFLOXACIN 500 MG: 5 INJECTION, SOLUTION INTRAVENOUS at 13:28

## 2019-05-03 RX ADMIN — BISACODYL 5 MG: 5 TABLET, COATED ORAL at 16:01

## 2019-05-03 RX ADMIN — POTASSIUM CHLORIDE 40 MEQ: 750 TABLET, EXTENDED RELEASE ORAL at 17:27

## 2019-05-03 RX ADMIN — CALCIUM ACETATE 1334 MG: 667 TABLET ORAL at 08:10

## 2019-05-03 RX ADMIN — CALCIUM ACETATE 1334 MG: 667 TABLET ORAL at 17:27

## 2019-05-03 RX ADMIN — CALCIUM ACETATE 1334 MG: 667 TABLET ORAL at 13:27

## 2019-05-03 RX ADMIN — POTASSIUM CHLORIDE 40 MEQ: 750 TABLET, EXTENDED RELEASE ORAL at 13:27

## 2019-05-03 RX ADMIN — HEPARIN SODIUM 5000 UNITS: 5000 INJECTION INTRAVENOUS; SUBCUTANEOUS at 20:11

## 2019-05-03 RX ADMIN — ARFORMOTEROL TARTRATE 15 MCG: 15 SOLUTION RESPIRATORY (INHALATION) at 19:44

## 2019-05-03 NOTE — PROGRESS NOTES
Hospitalist Progress Note  Ambika Naik MD  Answering service: [phone] from in house phone      Date of Service:  5/3/2019  NAME:  Leni Garza  :  1955  MRN:  [id number]    Admission Summary:   63M direct admit from advanced HF clinic. Was seen there for LEE- not cardiac, CT showed PNA  Interval history / Subjective:   Patient seen and examined at bedside, feels ok, managed to walk well with PT yesterday, refused dialysis this morning as in hospital doesn't like being lying in bed for HD- office do it in recliner chair which is better for his back/neck. They will try tocome back for HD today, if not tomorrow AM. Then dc     Assessment & Plan:     #. CAP: multifocal  - broad spectrum abx, guaifenesin, duoNebs PRN, Oxygen prn, lozenges, incentive spirometry  - monitor inflammatory markers, if spikes fever send BCs. Sputum culture if possible  - PT/OT eval did well, independent. #. ESRD: HD- MWF, Nephrology foloowing  #. Pulm HTN- severe  #. Diastolic CHF: TTE ef 63%. #. Chronic Anemia: likely related to ESRD, on epogen, monitor  #. HTN: chronic, stable, Home regimen, Monitor  #. Morbid obesity: counseled. Code status: Full  DVT prophylaxis: heparin  Care Plan discussed with: Patient/Family and Nurse  Disposition: TBD     Hospital Problems  Date Reviewed: 2018          Codes Class Noted POA    Dyspnea ICD-10-CM: R06.00  ICD-9-CM: 786.09  2019 Yes        * (Principal) CAP (community acquired pneumonia) ICD-10-CM: J18.9  ICD-9-CM: 990  2019 Yes        Essential hypertension ICD-10-CM: I10  ICD-9-CM: 401.9  2011 Yes            Review of Systems:   Pertinent items are mentioned in interval history. Vital Signs:    Last 24hrs VS reviewed since prior progress note.  Most recent are:  Visit Vitals  /90 (BP 1 Location: Right arm, BP Patient Position: Sitting)   Pulse 93   Temp 97.6 °F (36.4 °C)   Resp 18 Wt 81 kg (178 lb 9.2 oz)   SpO2 97%   BMI 27.97 kg/m²         Intake/Output Summary (Last 24 hours) at 5/3/2019 1134  Last data filed at 5/3/2019 0803  Gross per 24 hour   Intake 420 ml   Output 0 ml   Net 420 ml        Physical Examination:   General:  Alert, oriented, No acute distress  Resp:  No accessory muscle use, fair AE, mild wheezes, few rhonchi R>L  Abd:  Soft, non-tender, non-distended, BS+  Extremities:  No cyanosis or clubbing, no significant edema  Neuro:  Grossly normal, no focal neuro deficits, follows commands   Psych:  Good insight, AAOx3, not agitated. Data Review:    Review and/or order of clinical lab test  Review and/or order of tests in the radiology section of CPT  Review and/or order of tests in the medicine section of CPT  Labs:     Recent Labs     05/03/19 0220 05/02/19  1318   WBC 8.1 6.6   HGB 10.0* 9.9*   HCT 32.6* 32.6*    236     Recent Labs     05/03/19 0220 05/02/19  1318    138   K 3.2* 3.3*   CL 97 98   CO2 27 33*   BUN 58* 49*   CREA 8.99* 8.14*   GLU 99 89   CA 9.9 9.6   PHOS  --  4.6     Recent Labs     05/02/19  1318   ALB 3.3*     No results for input(s): INR, PTP, APTT in the last 72 hours. No lab exists for component: INREXT, INREXT   No results for input(s): FE, TIBC, PSAT, FERR in the last 72 hours. Lab Results   Component Value Date/Time    Folate 17.8 11/13/2018 12:00 AM      No results for input(s): PH, PCO2, PO2 in the last 72 hours. No results for input(s): CPK, CKNDX, TROIQ in the last 72 hours.     No lab exists for component: CPKMB  No results found for: CHOL, CHOLX, CHLST, CHOLV, HDL, LDL, LDLC, DLDLP, TGLX, TRIGL, TRIGP, CHHD, [facility]  Lab Results   Component Value Date/Time    Glucose (POC) 78 10/11/2016 07:27 AM    Glucose (POC) 97 03/08/2016 07:03 AM    Glucose (POC) 105 (H) 11/04/2015 11:20 AM    Glucose (POC) 78 10/13/2015 08:40 AM    Glucose (POC) 84 09/12/2012 09:47 AM    Glucose  12/24/2012 08:36 AM     No results found for: COLOR, APPRN, SPGRU, REFSG, JOSE, PROTU, GLUCU, KETU, BILU, UROU, HAYDE, LEUKU, GLUKE, EPSU, BACTU, WBCU, RBCU, CASTS, UCRY  Medications Reviewed:     Current Facility-Administered Medications   Medication Dose Route Frequency    albuterol-ipratropium (DUO-NEB) 2.5 MG-0.5 MG/3 ML  3 mL Nebulization Q4H PRN    potassium chloride SR (KLOR-CON 10) tablet 40 mEq  40 mEq Oral Q4H    heparin (porcine) injection 5,000 Units  5,000 Units SubCUTAneous Q8H    acetaminophen (TYLENOL) tablet 650 mg  650 mg Oral Q6H PRN    bisacodyl (DULCOLAX) tablet 5 mg  5 mg Oral DAILY PRN    calcium acetate (PHOSLO) tablet 1,334 mg  2 Tab Oral TID WITH MEALS    aspirin delayed-release tablet 81 mg  81 mg Oral DAILY    carvedilol (COREG) tablet 12.5 mg  12.5 mg Oral EVERY OTHER DAY    guaiFENesin ER (MUCINEX) tablet 600 mg  600 mg Oral DAILY    losartan (COZAAR) tablet 25 mg  25 mg Oral EVERY OTHER DAY    arformoterol (BROVANA) neb solution 15 mcg  15 mcg Nebulization BID RT    And    budesonide (PULMICORT) 500 mcg/2 ml nebulizer suspension  500 mcg Nebulization BID RT    albuterol (PROVENTIL VENTOLIN) nebulizer solution 2.5 mg  2.5 mg Nebulization Q4H PRN    levoFLOXacin (LEVAQUIN) 500 mg in D5W IVPB  500 mg IntraVENous Q48H    benzocaine-menthol (CEPACOL) lozenge 1 Lozenge  1 Lozenge Mucous Membrane PRN    cefepime (MAXIPIME) 1 g in 0.9% sodium chloride (MBP/ADV) 50 mL  1 g IntraVENous Q24H   ______________________________________________________________________  EXPECTED LENGTH OF STAY: 4d 4h  ACTUAL LENGTH OF STAY:          2               Lesley Cope MD

## 2019-05-03 NOTE — DIALYSIS
Patient refused treatment after setup stating he will get Dialysis at the later date because his bowels are moving and he does not feel comfortable going on the machine. Offered Bedpan but he insisted getting it done at a later day/time.  Informed primary nurse

## 2019-05-03 NOTE — PROGRESS NOTES
Bedside and Verbal shift change report given to Teressa Bonilla (oncoming nurse) by [facility] (offgoing nurse). Report included the following information SBAR, Kardex, ED Summary, MAR, Accordion, Med Rec Status and Cardiac Rhythm NSR . Problem: Falls - Risk of  Goal: *Absence of Falls  Description  Document Bud Marc Fall Risk and appropriate interventions in the flowsheet. Outcome: Progressing Towards Goal     Problem: Pneumonia: Day 2  Goal: *Oxygen saturation within defined limits  Outcome: Progressing Towards Goal  Goal: *Tolerating diet  Outcome: Progressing Towards Goal    1200 Patient spoke to RN regarding leaving AMA. Discussed that he can do his HD Monday. I inquired that if he could do it later today would he stay. Donnette Gitelman who stated that they could get patient on for \"tonight\" and tentatively have him set for 10 pm. I informed patient of the timing of HD and patient agreed to stay. Informed both Dr. Josr Harris and Dr. Chelsea Bunn.    1930 Bedside and Verbal shift change report given to [address] (oncoming nurse) by Teressa Bonilla (offgoing nurse). Report included the following information SBAR, Kardex, ED Summary, MAR, Accordion, Med Rec Status and Cardiac Rhythm NSR .

## 2019-05-03 NOTE — PROGRESS NOTES
[facility]   [address] [facility], [facility], [facility]  Phone: [phone]   [phone]       Nephrology Progress Note  Jorge Hinds     1955     [id number]  Date of Admission : 5/1/2019 05/03/19    CC:  Follow up for ESRD       Assessment and Plan   ESRD- HD :  - dialyzes MWF at [facility]   - Next HD today. Exertional Dyspnea  - pulling fluid with dialysis is helping. Pt says his fluid gains are usually ~5 Kg which is MUCH too high!  - rest per primary team     Pulm HTN - severe  Diastolic CHF  Echo w/ LVEF 45-50%     Anemia in CKD   - Ordered labs   - resume Epogen for Hb < 10     Sec HPTH   - continue Binders     HTN   - BP should improve with fluid removal on dialysis. - continue home meds        Interval History:  Mr Sweetie Garland presented to ER after dialysis due to worsening exertional dyspnea   On admission , he had Chest CT which showed B/L pulm ground glass opacities of unknown etiology   We were asked to see him for ESRD management   - ESRD 2/2 Nephrosclerosis ESRD after B/L Nephrectomy for ? RCC at [facility] in 2005   - Started on HD in 11/2005 by Dr Modesto Henry (L) AVF  Denies having any issues w/ dialysis and no issues reaching his dry weight   Denies any fevers/ chills/ rigors   Has RUE AVF for access      On 5/3:  Pt still with some SOB but says he feels much better since fluid pulled on dialysis. Says his fluid gains are usually ~5 Kg which is very high. He denies any complaints except for the SOB. BP's have been elevated. Review of Systems: A comprehensive review of systems was negative.     Current Medications:   Current Facility-Administered Medications   Medication Dose Route Frequency    heparin (porcine) injection 5,000 Units  5,000 Units SubCUTAneous Q8H    acetaminophen (TYLENOL) tablet 650 mg  650 mg Oral Q6H PRN    bisacodyl (DULCOLAX) tablet 5 mg  5 mg Oral DAILY PRN    albuterol-ipratropium (DUO-NEB) 2.5 MG-0.5 MG/3 ML  3 mL Nebulization QID RT    calcium acetate (PHOSLO) tablet 1,334 mg  2 Tab Oral TID WITH MEALS    aspirin delayed-release tablet 81 mg  81 mg Oral DAILY    carvedilol (COREG) tablet 12.5 mg  12.5 mg Oral EVERY OTHER DAY    guaiFENesin ER (MUCINEX) tablet 600 mg  600 mg Oral DAILY    losartan (COZAAR) tablet 25 mg  25 mg Oral EVERY OTHER DAY    arformoterol (BROVANA) neb solution 15 mcg  15 mcg Nebulization BID RT    And    budesonide (PULMICORT) 500 mcg/2 ml nebulizer suspension  500 mcg Nebulization BID RT    albuterol (PROVENTIL VENTOLIN) nebulizer solution 2.5 mg  2.5 mg Nebulization Q4H PRN    levoFLOXacin (LEVAQUIN) 500 mg in D5W IVPB  500 mg IntraVENous Q48H    albuterol-ipratropium (DUO-NEB) 2.5 MG-0.5 MG/3 ML  3 mL Nebulization Q4H PRN    benzocaine-menthol (CEPACOL) lozenge 1 Lozenge  1 Lozenge Mucous Membrane PRN    cefepime (MAXIPIME) 1 g in 0.9% sodium chloride (MBP/ADV) 50 mL  1 g IntraVENous Q24H      Allergies   Allergen Reactions    Milk Anaphylaxis    Shellfish Derived Anaphylaxis    Benadryl [Diphenhydramine Hcl] Nausea and Vomiting    Zofran Odt [Ondansetron] Nausea and Vomiting    Iodinated Contrast- Oral And Iv Dye Other (comments)    Peanut Shortness of Breath    Penicillins Unknown (comments)       Objective:  Vitals:    Vitals:    05/03/19 0124 05/03/19 0320 05/03/19 0740 05/03/19 0803   BP:  169/87 172/90    Pulse:  91 93    Resp:  17 18    Temp:  97.7 °F (36.5 °C) 97.6 °F (36.4 °C)    SpO2:  97% 100% 97%   Weight: 81 kg (178 lb 9.2 oz)        Intake and Output:  No intake/output data recorded.   05/01 1901 - 05/03 0700  In: 530 [P.O.:480; I.V.:50]  Out: -     Physical Examination:    General: NAD,Conversant  Neck:  Supple, no mass  Resp:  Lungs with diminished breath sounds and a few very slight expiratory wheezes; , normal respiratory effort  CV:  RRR,  no murmur or rub,no LE edema  GI:  Soft and non-tender; no hepatosplenomegaly  Neurologic:  Non focal; alert and OX 3  Psych: Normal affect and mood   Skin:  No Rash  Ext                   lue avf +THRILL     []    High complexity decision making was performed  []    Patient is at high-risk of decompensation with multiple organ involvement    Lab Data Personally Reviewed: I have reviewed all the pertinent labs, microbiology data and radiology studies during assessment. Recent Labs     05/03/19 0220 05/02/19  1318    138   K 3.2* 3.3*   CL 97 98   CO2 27 33*   GLU 99 89   BUN 58* 49*   CREA 8.99* 8.14*   CA 9.9 9.6   PHOS  --  4.6   ALB  --  3.3*     Recent Labs     05/03/19 0220 05/02/19  1318   WBC 8.1 6.6   HGB 10.0* 9.9*   HCT 32.6* 32.6*    236     No results found for: SDES  Lab Results   Component Value Date/Time    Culture result: MODERATE NORMAL RESPIRATORY DEVON 05/01/2019 01:46 PM    Culture result:  09/24/2015 12:00 PM     MRSA NOT PRESENT. Apparent Staphylococcus aureus(not MRSA noted.)    Culture result:  09/24/2015 12:00 PM         Screening of patient nares for MRSA is for surveillance purposes and, if positive, to facilitate isolation considerations in high risk settings. It is not intended for automatic decolonization interventions per se as regimens are not sufficiently effective to warrant routine use. Recent Results (from the past 24 hour(s))   CBC WITH AUTOMATED DIFF    Collection Time: 05/02/19  1:18 PM   Result Value Ref Range    WBC 6.6 4.1 - 11.1 K/uL    RBC 3.04 (L) 4.10 - 5.70 M/uL    HGB 9.9 (L) 12.1 - 17.0 g/dL    HCT 32.6 (L) 36.6 - 50.3 %    .2 (H) 80.0 - 99.0 FL    MCH 32.6 26.0 - 34.0 PG    MCHC 30.4 30.0 - 36.5 g/dL    RDW 12.4 11.5 - 14.5 %    PLATELET 269 966 - 557 K/uL    MPV 10.3 8.9 - 12.9 FL    NRBC 0.0 0  WBC    ABSOLUTE NRBC 0.00 0.00 - 0.01 K/uL    NEUTROPHILS 62 32 - 75 %    LYMPHOCYTES 20 12 - 49 %    MONOCYTES 10 5 - 13 %    EOSINOPHILS 7 0 - 7 %    BASOPHILS 1 0 - 1 %    IMMATURE GRANULOCYTES 0 0.0 - 0.5 %    ABS. NEUTROPHILS 4.1 1.8 - 8.0 K/UL    ABS. LYMPHOCYTES 1.3 0.8 - 3.5 K/UL    ABS. MONOCYTES 0.7 0.0 - 1.0 K/UL    ABS. EOSINOPHILS 0.5 (H) 0.0 - 0.4 K/UL    ABS. BASOPHILS 0.0 0.0 - 0.1 K/UL    ABS. IMM. GRANS. 0.0 0.00 - 0.04 K/UL    DF AUTOMATED     RENAL FUNCTION PANEL    Collection Time: 05/02/19  1:18 PM   Result Value Ref Range    Sodium 138 136 - 145 mmol/L    Potassium 3.3 (L) 3.5 - 5.1 mmol/L    Chloride 98 97 - 108 mmol/L    CO2 33 (H) 21 - 32 mmol/L    Anion gap 7 5 - 15 mmol/L    Glucose 89 65 - 100 mg/dL    BUN 49 (H) 6 - 20 MG/DL    Creatinine 8.14 (H) 0.70 - 1.30 MG/DL    BUN/Creatinine ratio 6 (L) 12 - 20      GFR est AA 8 (L) >60 ml/min/1.73m2    GFR est non-AA 7 (L) >60 ml/min/1.73m2    Calcium 9.6 8.5 - 10.1 MG/DL    Phosphorus 4.6 2.6 - 4.7 MG/DL    Albumin 3.3 (L) 3.5 - 5.0 g/dL   CBC WITH AUTOMATED DIFF    Collection Time: 05/03/19  2:20 AM   Result Value Ref Range    WBC 8.1 4.1 - 11.1 K/uL    RBC 3.11 (L) 4.10 - 5.70 M/uL    HGB 10.0 (L) 12.1 - 17.0 g/dL    HCT 32.6 (L) 36.6 - 50.3 %    .8 (H) 80.0 - 99.0 FL    MCH 32.2 26.0 - 34.0 PG    MCHC 30.7 30.0 - 36.5 g/dL    RDW 12.7 11.5 - 14.5 %    PLATELET 044 590 - 782 K/uL    MPV 10.6 8.9 - 12.9 FL    NRBC 0.0 0  WBC    ABSOLUTE NRBC 0.00 0.00 - 0.01 K/uL    NEUTROPHILS 56 32 - 75 %    LYMPHOCYTES 24 12 - 49 %    MONOCYTES 10 5 - 13 %    EOSINOPHILS 8 (H) 0 - 7 %    BASOPHILS 1 0 - 1 %    IMMATURE GRANULOCYTES 1 (H) 0.0 - 0.5 %    ABS. NEUTROPHILS 4.6 1.8 - 8.0 K/UL    ABS. LYMPHOCYTES 1.9 0.8 - 3.5 K/UL    ABS. MONOCYTES 0.8 0.0 - 1.0 K/UL    ABS. EOSINOPHILS 0.6 (H) 0.0 - 0.4 K/UL    ABS. BASOPHILS 0.1 0.0 - 0.1 K/UL    ABS. IMM.  GRANS. 0.1 (H) 0.00 - 0.04 K/UL    DF AUTOMATED     METABOLIC PANEL, BASIC    Collection Time: 05/03/19  2:20 AM   Result Value Ref Range    Sodium 136 136 - 145 mmol/L    Potassium 3.2 (L) 3.5 - 5.1 mmol/L    Chloride 97 97 - 108 mmol/L    CO2 27 21 - 32 mmol/L    Anion gap 12 5 - 15 mmol/L    Glucose 99 65 - 100 mg/dL    BUN 58 (H) 6 - 20 MG/DL Creatinine 8.99 (H) 0.70 - 1.30 MG/DL    BUN/Creatinine ratio 6 (L) 12 - 20      GFR est AA 7 (L) >60 ml/min/1.73m2    GFR est non-AA 6 (L) >60 ml/min/1.73m2    Calcium 9.9 8.5 - 10.1 MG/DL   C REACTIVE PROTEIN, QT    Collection Time: 05/03/19  2:20 AM   Result Value Ref Range    C-Reactive protein 1.51 (H) 0.00 - 0.60 mg/dL           Total time spent with patient:  xxx   min. Care Plan discussed with:  Patient     Family      RN      Consulting Physician [address] [facility]        I have reviewed the flowsheets. Chart and Pertinent Notes have been reviewed. No change in PMH ,family and social history from Consult note.       Nettie Oneill MD

## 2019-05-03 NOTE — PROGRESS NOTES
ADULT PROTOCOL: JET AEROSOL ASSESSMENT    Patient  Daniel Newton     70 y.o.   male     5/3/2019  9:40 AM    Breath Sounds Pre Procedure: Right Breath Sounds: Clear, Diminished                               Left Breath Sounds: Clear, Diminished    Breath Sounds Post Procedure: Right Breath Sounds: Clear, Diminished                                 Left Breath Sounds: Clear, Diminished    Breathing pattern: Pre procedure Breathing Pattern: Regular          Post procedure Breathing Pattern: Regular    Heart Rate: Pre procedure Pulse: 90           Post procedure Pulse: 92    Resp Rate: Pre procedure Respirations: 18           Post procedure Respirations: 18      Oxygen: O2 Device: Room air        Changed: NO    SpO2: Pre procedure SpO2: 97 %   without oxygen              Post procedure SpO2: 94 %  without oxygen    Nebulizer Therapy: Current medications Aerosolized Medications: Pulmicort, Brovana      Changed: YES, to PRN.  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   male     5/3/2019  9:40 AM    Breath Sounds Pre Procedure: Right Breath Sounds: Clear, Diminished                               Left Breath Sounds: Clear, Diminished    Breath Sounds Post Procedure: Right Breath Sounds: Clear, Diminished Left Breath Sounds: Clear, Diminished    Breathing pattern: Pre procedure Breathing Pattern: Regular          Post procedure Breathing Pattern: Regular    Heart Rate: Pre procedure Pulse: 90           Post procedure Pulse: 92    Resp Rate: Pre procedure Respirations: 18           Post procedure Respirations: 18    Oxygen: O2 Device: Room air            SpO2: Pre procedure SpO2: 97 %   without oxygen              Post procedure SpO2: 94 %  without oxygen    Nebulizer Therapy: Current medications Aerosolized Medications: Pulmicort, Brovana      Changed: YES, to PRN.  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

## 2019-05-03 NOTE — DIALYSIS
TRANSFER - IN REPORT:    Verbal report received from Dwain Russell on [facility]  being received from [facility]  For Dialysis      Report consisted of patients Situation, Background, Assessment and   Recommendations(SBAR). Information from the following report(s) SBARwas reviewed with the receiving nurse. Opportunity for questions and clarification was provided. Assessment completed upon patients arrival to unit and care assumed.

## 2019-05-04 VITALS
TEMPERATURE: 97.6 F | HEART RATE: 107 BPM | DIASTOLIC BLOOD PRESSURE: 90 MMHG | OXYGEN SATURATION: 94 % | RESPIRATION RATE: 18 BRPM | WEIGHT: 178.57 LBS | SYSTOLIC BLOOD PRESSURE: 139 MMHG | BODY MASS INDEX: 27.97 KG/M2

## 2019-05-04 PROCEDURE — 74011250637 HC RX REV CODE- 250/637: Performed by: NURSE PRACTITIONER

## 2019-05-04 PROCEDURE — 94640 AIRWAY INHALATION TREATMENT: CPT

## 2019-05-04 PROCEDURE — 74011250636 HC RX REV CODE- 250/636: Performed by: INTERNAL MEDICINE

## 2019-05-04 PROCEDURE — 74011000258 HC RX REV CODE- 258: Performed by: INTERNAL MEDICINE

## 2019-05-04 PROCEDURE — 74011000250 HC RX REV CODE- 250: Performed by: INTERNAL MEDICINE

## 2019-05-04 PROCEDURE — 5A1D70Z PERFORMANCE OF URINARY FILTRATION, INTERMITTENT, LESS THAN 6 HOURS PER DAY: ICD-10-PCS | Performed by: INTERNAL MEDICINE

## 2019-05-04 PROCEDURE — 90935 HEMODIALYSIS ONE EVALUATION: CPT

## 2019-05-04 PROCEDURE — 74011250637 HC RX REV CODE- 250/637: Performed by: INTERNAL MEDICINE

## 2019-05-04 RX ORDER — LEVOFLOXACIN 750 MG/1
750 TABLET ORAL DAILY
Qty: 3 TAB | Refills: 0 | Status: SHIPPED | OUTPATIENT
Start: 2019-05-04 | End: 2019-05-09 | Stop reason: ALTCHOICE

## 2019-05-04 RX ADMIN — HEPARIN SODIUM 5000 UNITS: 5000 INJECTION INTRAVENOUS; SUBCUTANEOUS at 03:51

## 2019-05-04 RX ADMIN — BUDESONIDE 500 MCG: 0.5 INHALANT RESPIRATORY (INHALATION) at 07:10

## 2019-05-04 RX ADMIN — EPOETIN ALFA-EPBX 12000 UNITS: 10000 INJECTION, SOLUTION INTRAVENOUS; SUBCUTANEOUS at 15:52

## 2019-05-04 RX ADMIN — CALCIUM ACETATE 1334 MG: 667 TABLET ORAL at 09:07

## 2019-05-04 RX ADMIN — ASPIRIN 81 MG: 81 TABLET ORAL at 09:07

## 2019-05-04 RX ADMIN — ARFORMOTEROL TARTRATE 15 MCG: 15 SOLUTION RESPIRATORY (INHALATION) at 07:09

## 2019-05-04 RX ADMIN — CARVEDILOL 12.5 MG: 12.5 TABLET, FILM COATED ORAL at 03:59

## 2019-05-04 RX ADMIN — LOSARTAN POTASSIUM 25 MG: 25 TABLET ORAL at 15:52

## 2019-05-04 RX ADMIN — GUAIFENESIN 600 MG: 600 TABLET, EXTENDED RELEASE ORAL at 09:07

## 2019-05-04 RX ADMIN — CEFEPIME HYDROCHLORIDE 1 G: 1 INJECTION, POWDER, FOR SOLUTION INTRAMUSCULAR; INTRAVENOUS at 00:34

## 2019-05-04 NOTE — DISCHARGE SUMMARY
Discharge Summary       PATIENT ID: Braxton Graves  MRN: [id number]   YOB: 1955    DATE OF ADMISSION: 5/1/2019 11:41 AM    DATE OF DISCHARGE: 5/4/2019   PRIMARY CARE PROVIDER: Angélica Leonard MD     ATTENDING PHYSICIAN: Primo Farah MD  DISCHARGING PROVIDER: Primo Farah MD    To contact this individual call [phone] and ask the  to page. If unavailable ask to be transferred the Adult Hospitalist Department. CONSULTATIONS: IP CONSULT TO NEPHROLOGY    PROCEDURES/SURGERIES: * No surgery found *    ADMITTING DIAGNOSES & HOSPITAL COURSE:   Admitted with sob, cough productive of phlegm, CT showed CAP, multifocal, improved with abx, dc with PCP f/u. Risk of Re-Admission: high  DISCHARGE DIAGNOSES / PLAN:      #. CAP: multifocal  - broad spectrum abx, guaifenesin, duoNebs PRN, Oxygen prn, lozenges, incentive spirometry- improved with treatment.  - PT/OT eval did well, independent. dc home with PCP f/u     #. ESRD: HD- MWF, Nephrology foloowing. #. Pulm HTN- severe  #. Diastolic CHF: TTE ef 43%. #. Chronic Anemia: likely related to ESRD, on epogen, monitor  #. HTN: chronic, stable, Home regimen, Monitor  #. Morbid obesity: counseled. FOLLOW UP APPOINTMENTS:    Follow-up Information     Follow up With Specialties Details Why Richard Womack MD Internal Medicine In 1 week  [address] [facility] [phone]           ADDITIONAL CARE RECOMMENDATIONS:  Follow up with PCP     DIET: Resume previous diet    ACTIVITY: Activity as tolerated    DISCHARGE MEDICATIONS:  Current Discharge Medication List      START taking these medications    Details   levoFLOXacin (LEVAQUIN) 750 mg tablet Take 1 Tab by mouth daily. Qty: 3 Tab, Refills: 0         CONTINUE these medications which have NOT CHANGED    Details   guaiFENesin ER (MUCINEX) 600 mg ER tablet Take 600 mg by mouth every twelve (12) hours.       cyclobenzaprine (FLEXERIL) 10 mg tablet TAKE ONE TABLET BY MOUTH THREE TIMES DAILY AS NEEDED FOR MUSCLE SPASM  Qty: 90 Tab, Refills: 0      carvedilol (COREG) 6.25 mg tablet Take 12.5 mg by mouth every other day. Qty: 60 Tab, Refills: 12    Associated Diagnoses: Essential hypertension      losartan (COZAAR) 25 mg tablet Take  by mouth every other day. calcium acetate (PHOSLO) 667 mg cap Take 3 Caps by mouth three (3) times daily (with meals). Patient claims to take 1cap TID      aspirin delayed-release 81 mg tablet Take 81 mg by mouth daily. albuterol (PROVENTIL HFA, VENTOLIN HFA, PROAIR HFA) 90 mcg/actuation inhaler Take 2 Puffs by inhalation every four (4) hours as needed for Wheezing. Qty: 1 Inhaler, Refills: 12    Associated Diagnoses: Asthma, mild intermittent, uncomplicated      fluticasone propion-salmeterol (ADVAIR DISKUS) 250-50 mcg/dose diskus inhaler Take 1 Puff by inhalation every twelve (12) hours. Not taking because of cost; never filled prescription      bisacodyl (DULCOLAX, BISACODYL,) 5 mg EC tablet Take 5 mg by mouth daily as needed for Constipation. sildenafil citrate (VIAGRA) 100 mg tablet Take 1 Tab by mouth as needed. Qty: 8 Tab, Refills: 12      acetaminophen (TYLENOL) 325 mg tablet Take 2 Tabs by mouth every six (6) hours. Indications: PAIN  Qty: 60 Tab, Refills: 0           NOTIFY YOUR PHYSICIAN FOR ANY OF THE FOLLOWING:   Fever over 101 degrees for 24 hours. Chest pain, shortness of breath, fever, chills, nausea, vomiting, diarrhea, change in mentation, falling, weakness, bleeding. Severe pain or pain not relieved by medications. Or, any other signs or symptoms that you may have questions about.     DISPOSITION:    Home With:   OT  PT  HH  RN       Long term SNF/Inpatient Rehab   x Independent/assisted living    Hospice    Other:     PATIENT CONDITION AT DISCHARGE:     Functional status    Poor     Deconditioned     Independent      Cognition     Lucid     Forgetful     Dementia      Catheters/lines (plus indication)    Duran     PICC     PEG     None      Code status     Full code     DNR      PHYSICAL EXAMINATION AT DISCHARGE:  Patient seen and examined at bedside, Condition stable, explained discharge and follow up plans. General:  Alert, oriented, No acute distress  Resp:  No accessory muscle use, Good AE. Neuro:  Grossly normal, no focal neuro deficits, follows commands   CHRONIC MEDICAL DIAGNOSES:  Problem List as of 5/4/2019 Date Reviewed: 12/13/2018          Codes Class Noted - Resolved    Dyspnea ICD-10-CM: R06.00  ICD-9-CM: 786.09  5/1/2019 - Present        * (Principal) CAP (community acquired pneumonia) ICD-10-CM: J18.9  ICD-9-CM: 486  5/1/2019 - Present        Pseudoaneurysm of arteriovenous dialysis fistula ([facility] ICD-10-CM: G45.325F  ICD-9-CM: 996.73  8/14/2016 - Present        CKD (chronic kidney disease) stage V requiring chronic dialysis ([facility] ICD-10-CM: N18.6, Z99.2  ICD-9-CM: 585.6, V45.11  8/14/2016 - Present        Right inguinal hernia ICD-10-CM: K40.90  ICD-9-CM: 550.90  3/1/2016 - Present        Ventral incisional hernia ICD-10-CM: K43.2  ICD-9-CM: 553.21  3/1/2016 - Present        Primary localized osteoarthritis of right hip ICD-10-CM: M16.11  ICD-9-CM: 715.15  10/13/2015 - Present        Osteoarthrosis, unspecified whether generalized or localized, unspecified site ICD-10-CM: M19.90  ICD-9-CM: 715.90  2/18/2011 - Present        Asthma ICD-10-CM: J45.909  ICD-9-CM: 493.90  2/18/2011 - Present        Essential hypertension ICD-10-CM: I10  ICD-9-CM: 401.9  2/18/2011 - Present        Renal Disease ICD-10-CM: N28.9  ICD-9-CM: 593.9  2/18/2011 - Present        RESOLVED: Encephalopathy ICD-10-CM: G93.40  ICD-9-CM: 348.30  11/4/2015 - 11/5/2015              37 minutes were spent with the patient on counseling and coordination of care.     Signed:   Maryann Thakur MD  5/4/2019  3:24 PM

## 2019-05-04 NOTE — DIALYSIS
[facility] Team [facility]  [phone]    Vitals   Pre   Post   Assessment   Pre   Post     Temp  Temp: 97.6 °F (36.4 °C) (05/04/19 1110)  97.7 LOC  A/O x 3  Appropriate responses A/O x 3   HR   90 104 Lungs   SOB at 8/10. Does not want O2  Coughing up thick nucus  Denies SOB  No more coughing   B/P   167/95 123/90 Cardiac   HRR, monitored at [facility]  HRR/ monitored at [facility]   Resp   18 20 Skin   WDI  WDI   Pain level  0/10 0/10 Edema  Orbital and gen puffiness noted     None noted   Orders:    Duration:   Start:    1115 End:    1430 Total:   3.25hrs   Dialyzer:   Dialyzer/Set Up Inspection: Manuel([id number]/ [id number]) (05/04/19 1110)   K Bath:   Dialysate K (mEq/L): 3 (05/04/19 1110)   Ca Bath:   Dialysate CA (mEq/L): 2.5 (05/04/19 1110)   Na/Bicarb: Target Fluid Removal:   Goal/Amount of Fluid to Remove (mL): 4000 mL (05/04/19 1110)   Access     Type & Location:   NIRMAL-AVF:  T/B, no redness or drainage. Aneurysmal areas at access. Cannulated w/ 15g x 1\" needles x 2.  +flashes/ aspir/ NS flushes. Wanted to stop tx at 26,  But tolerated tx well after upright in chair. At end of tx, blood in circuit returned w/ 300ml NS. Cannulas removed x 2. Pressure held to each site x 8 mins till no bleeding noted. Drsgs applied and secured. +T/B. Labs     Obtained/Reviewed   Critical Results Called   Date when labs were drawn-  Hgb-    HGB   Date Value Ref Range Status   05/03/2019 10.0 (L) 12.1 - 17.0 g/dL Final     K-    Potassium   Date Value Ref Range Status   05/03/2019 3.2 (L) 3.5 - 5.1 mmol/L Final     Ca-   Calcium   Date Value Ref Range Status   05/03/2019 9.9 8.5 - 10.1 MG/DL Final     Bun-   BUN   Date Value Ref Range Status   05/03/2019 58 (H) 6 - 20 MG/DL Final     Creat-   Creatinine   Date Value Ref Range Status   05/03/2019 8.99 (H) 0.70 - 1.30 MG/DL Final        Medications/ Blood Products Given     Name   Dose   Route and Time     All meds reviewed.   No HD meds ordered or given Blood Volume Processed (BVP):    71 Net Fluid   Removed:  3700 ml   Comments   Time Out Done:   Yes, by YURI at 1110  Primary Nurse Rpt Pre:  Xochitl Campbell RN  Primary Nurse Rpt Post:  Janeen Sutherland RN  Pt Education:  Discussed need to complete entire tx to remove as much fluid as possible, which would possibly ease coughing and gagging  Care Plan:  Continue to do HD txs as ordered  Tx Summary:  Tolerated tx much better after getting upright in chair. Admiting Diagnosis:  Pneumonia/ CHF  Pt's previous clinic-  Consent signed - Informed Consent Verified: Yes (05/04/19 1110)  Nikiaita Consent - verified  Hepatitis Status- Hep B AB 10 4/22/19                              Hep B Ag neg 12/ 27/17  Machine #- Machine Number: [id number]/ [id number] (05/04/19 1110)  Telemetry status- monitored at bedside  Pre-dialysis wt. - Pre-Dialysis Weight: 82 kg (180 lb 12.4 oz) (05/04/19 1110)

## 2019-05-04 NOTE — DISCHARGE INSTRUCTIONS
Discharge Instructions       PATIENT ID: Awilda Hubbard  MRN: [id number]   YOB: 1955    DATE OF ADMISSION: 5/1/2019 11:41 AM    DATE OF DISCHARGE: 5/4/2019    PRIMARY CARE PROVIDER: Faiza Hung MD     ATTENDING PHYSICIAN: Pamela Iyer MD  DISCHARGING PROVIDER: Anju Hoffman MD    To contact this individual call [phone] and ask the  to page. If unavailable ask to be transferred the Adult Hospitalist Department. DISCHARGE DIAGNOSES CAP    CONSULTATIONS: IP CONSULT TO NEPHROLOGY    PROCEDURES/SURGERIES: * No surgery found *    FOLLOW UP APPOINTMENTS:   Follow-up Information     Follow up With Specialties Details Why Samia Dodson MD Internal Medicine In 1 week  [facility]  [phone]             ADDITIONAL CARE RECOMMENDATIONS: follow up with PCP and Nephrology    DIET: Resume previous diet    DISCHARGE MEDICATIONS:  Levofloxacin to finish course    · It is important that you take the medication exactly as they are prescribed. · Keep your medication in the bottles provided by the pharmacist and keep a list of the medication names, dosages, and times to be taken in your wallet. · Do not take other medications without consulting your doctor. NOTIFY YOUR PHYSICIAN FOR ANY OF THE FOLLOWING:   Fever over 101 degrees for 24 hours. Chest pain, shortness of breath, fever, chills, nausea, vomiting, diarrhea, change in mentation, falling, weakness, bleeding. Severe pain or pain not relieved by medications. Or, any other signs or symptoms that you may have questions about. It was our pleasure to help take care of you and we hope you get well very soon.     DISPOSITION:    Home With:   OT  PT  HH  RN       SNF/Inpatient Rehab/LTAC   x Independent/assisted living    Hospice    Other:     CDMP Checked:   Yes x     PROBLEM LIST Updated:  Yes x     Signed:   Anju Hoffman MD  5/4/2019  3:23 PM

## 2019-05-04 NOTE — PROGRESS NOTES
Bedside and Verbal shift change report given to Tamera Samuels (oncoming nurse) by Slava Sprague (offgoing nurse). Report included the following information SBAR, Kardex, Intake/Output, MAR, Recent Results, Med Rec Status and Cardiac Rhythm NSR with 1 degree AVB . Problem: Falls - Risk of  Goal: *Absence of Falls  Description  Document Johanna Aung Fall Risk and appropriate interventions in the flowsheet. Outcome: Progressing Towards Goal     Problem: Patient Education: Go to Patient Education Activity  Goal: Patient/Family Education  Outcome: Progressing Towards Goal      0930 HD to start this morning. Patient is aware.

## 2019-05-04 NOTE — PROGRESS NOTES
2000: Bedside and Verbal shift change report given to Cynthia Hernandez RN  (oncoming nurse) by Jennyfer Huang RN (offgoing nurse). Report included the following information SBAR, Kardex, Procedure Summary, Intake/Output, MAR, Recent Results and Med Rec Status    2330: RN spoke with Hugo Ramirez (dialysis RN). Raffaele Hairston unable to do dialysis this evening. Patient to have dialysis early in the morning. 0400:  RN gave patient scheduled 12.5 mg Coreg early for /96 (). Patient states he usually takes Coreg and Losartan the night before dialysis. Patient was supposed to receive dialysis yesterday morning. RN to continue to monitor and pass along in report. 0730: Bedside and Verbal shift change report given to Reagan Cagle (oncoming nurse) by Cynthia Hernandez RN (offgoing nurse). Report included the following information SBAR, Kardex, ED Summary, Intake/Output, MAR, Recent Results and Med Rec Status. .   Problem: Falls - Risk of  Goal: *Absence of Falls  Description  Document Ethan Costa Fall Risk and appropriate interventions in the flowsheet. Outcome: Progressing Towards Goal     Problem: Pneumonia: Day 1  Goal: Activity/Safety  Outcome: Progressing Towards Goal     Problem: Pressure Injury - Risk of  Goal: *Prevention of pressure injury  Description  Document Danny Scale and appropriate interventions in the flowsheet.   Outcome: Progressing Towards Goal

## 2019-05-04 NOTE — PROGRESS NOTES
[facility]   [address] [facility], [facility] [address], [facility] [place] [facility]  Phone: [phone]   [phone]       Nephrology Progress Note  Gregory Briggs     1955     [id number]  Date of Admission : 5/1/2019 05/04/19    CC:  Follow up for ESRD       Assessment and Plan   ESRD- HD :  - dialyzes MWF at [facility]   - seen on hd today  - remove 3.5 - 4 kg  - he refused dialysis yesterday     Exertional Dyspnea  - hd today    Pulm HTN - severe  Diastolic CHF  Echo w/ LVEF 45-50%     Anemia in CKD   - restart epo    Sec HPTH   - continue Binders     HTN   - BP should improve with fluid removal on dialysis. Interval History:  Seen on hd  He is having sob  He refused hd yesterday as he was having bowel movement and he didn't want to use bedpan. No fever  C/o  cough    Review of Systems: A comprehensive review of systems was negative.     Current Medications:   Current Facility-Administered Medications   Medication Dose Route Frequency    albuterol-ipratropium (DUO-NEB) 2.5 MG-0.5 MG/3 ML  3 mL Nebulization Q4H PRN    heparin (porcine) injection 5,000 Units  5,000 Units SubCUTAneous Q8H    acetaminophen (TYLENOL) tablet 650 mg  650 mg Oral Q6H PRN    bisacodyl (DULCOLAX) tablet 5 mg  5 mg Oral DAILY PRN    calcium acetate (PHOSLO) tablet 1,334 mg  2 Tab Oral TID WITH MEALS    aspirin delayed-release tablet 81 mg  81 mg Oral DAILY    carvedilol (COREG) tablet 12.5 mg  12.5 mg Oral EVERY OTHER DAY    guaiFENesin ER (MUCINEX) tablet 600 mg  600 mg Oral DAILY    losartan (COZAAR) tablet 25 mg  25 mg Oral EVERY OTHER DAY    arformoterol (BROVANA) neb solution 15 mcg  15 mcg Nebulization BID RT    And    budesonide (PULMICORT) 500 mcg/2 ml nebulizer suspension  500 mcg Nebulization BID RT    albuterol (PROVENTIL VENTOLIN) nebulizer solution 2.5 mg  2.5 mg Nebulization Q4H PRN    levoFLOXacin (LEVAQUIN) 500 mg in D5W IVPB  500 mg IntraVENous Q48H    benzocaine-menthol (CEPACOL) lozenge 1 Lozenge  1 Lozenge Mucous Membrane PRN    cefepime (MAXIPIME) 1 g in 0.9% sodium chloride (MBP/ADV) 50 mL  1 g IntraVENous Q24H      Allergies   Allergen Reactions    Milk Anaphylaxis    Shellfish Derived Anaphylaxis    Benadryl [Diphenhydramine Hcl] Nausea and Vomiting    Zofran Odt [Ondansetron] Nausea and Vomiting    Iodinated Contrast- Oral And Iv Dye Other (comments)    Peanut Shortness of Breath    Penicillins Unknown (comments)       Objective:  Vitals:    Vitals:    05/04/19 0603 05/04/19 0710 05/04/19 0813 05/04/19 1110   BP:   139/73 (!) 167/97   Pulse:   88 93   Resp:   18 16   Temp:   97.7 °F (36.5 °C) 97.6 °F (36.4 °C)   SpO2:  98% 94%    Weight: 81 kg (178 lb 9.2 oz)        Intake and Output:  No intake/output data recorded. 05/02 1901 - 05/04 0700  In: 1410 [P.O.:1260; I.V.:150]  Out: 0     Physical Examination:    GEN:  NAD  NECK:  Supple, no thyromegaly  RESP: CTA  b/l, no  wheezing,   CVS: RRR,S1,S2   ABDO:  soft , non tender, No mass  NEURO: non focal, normal speech  Ext                   lue avf +THRILL     []    High complexity decision making was performed  []    Patient is at high-risk of decompensation with multiple organ involvement    Lab Data Personally Reviewed: I have reviewed all the pertinent labs, microbiology data and radiology studies during assessment. Recent Labs     05/03/19 0220 05/02/19  1318    138   K 3.2* 3.3*   CL 97 98   CO2 27 33*   GLU 99 89   BUN 58* 49*   CREA 8.99* 8.14*   CA 9.9 9.6   PHOS  --  4.6   ALB  --  3.3*     Recent Labs     05/03/19  0220 05/02/19  1318   WBC 8.1 6.6   HGB 10.0* 9.9*   HCT 32.6* 32.6*    236     No results found for: SDES  Lab Results   Component Value Date/Time    Culture result: MODERATE NORMAL RESPIRATORY DEVON 05/01/2019 01:46 PM    Culture result:  09/24/2015 12:00 PM     MRSA NOT PRESENT.  Apparent Staphylococcus aureus(not MRSA noted.)    Culture result:  09/24/2015 12:00 PM         Screening of patient nares for MRSA is for surveillance purposes and, if positive, to facilitate isolation considerations in high risk settings. It is not intended for automatic decolonization interventions per se as regimens are not sufficiently effective to warrant routine use. No results found for this or any previous visit (from the past 24 hour(s)). Total time spent with patient:  xxx   min. Care Plan discussed with:  Patient     Family      RN      Consulting Physician [address] [place] [address]        I have reviewed the flowsheets. Chart and Pertinent Notes have been reviewed. No change in PMH ,family and social history from Consult note.       Fifi Harding MD

## 2019-05-06 ENCOUNTER — PATIENT OUTREACH (OUTPATIENT)
Dept: INTERNAL MEDICINE CLINIC | Age: 64
End: 2019-05-06

## 2019-05-06 NOTE — PROGRESS NOTES
Hospital Discharge Follow-Up      Date/Time:  2019 4:45 PM    Patient was admitted to [facility] on  and discharged on  for Pneumonia. The physician discharge summary was available at the time of outreach. Patient was contacted within 1 business days of discharge. Top Challenges reviewed with the provider   Concerning: Pt says he drove himself home from hospital and didn't remember where he was going, he didn't stop driving until he was out of gas- 5 hrs outside of [place]. Niece came to pick him up. Finding difficulty finding words reports improvement on  denies hx of cva. Advises of s/s of cva and advised to report to ed if occurs. Pt admitting diagnosis was pneumonia he picked up Levaquin on ; he is not able to afford the Advair would like to discuss something comparable but affordable, maybe AirDuoRespiClick. Method of communication with provider :chart routing, phone to Ardith Saint., LPN    Inpatient RRAT score: 15  Was this a readmission? no   Patient stated reason for the readmission: n/a    Nurse Navigator (NN) contacted the patient by telephone to perform post hospital discharge assessment. Verified name and  with patient as identifiers. Provided introduction to self, and explanation of the Nurse Navigator role. Reviewed discharge instructions and red flags with patient who verbalized understanding. Patient given an opportunity to ask questions and does not have any further questions or concerns at this time. The patient agrees to contact the PCP office for questions related to their healthcare. NN provided contact information for future reference. Disease Specific:   CHF    Summary of patient's top problems:  1. Pneumonia- hx of Pulm HTN-severe, Asthma, Diastolic CHF EF 15%, On admission , he had Chest CT which showed B/L pulm ground glass opacities of unknown etiology       2. ESRD- anemia, ESRD 2/2 Nephrosclerosis ESRD after B/L [place] [facility] [place] Started on HD in 11/2005 by Dr Loli Espinoza (L) AVF     Ref. Range 5/2/2019 13:18 5/3/2019 02:20   HGB Latest Ref Range: 12.1 - 17.0 g/dL 9.9 (L) 10.0 (L)       3. [facility] 909 orders at discharge: [facility]: [facility]  Date of initial visit: TBD    Durable Medical Equipment ordered/company: N/A  Durable Medical Equipment received: N/A    Barriers to care? financial    Advance Care Planning:   Does patient have an Advance Directive:  not on file; education provided     Medication(s):   New Medications at Discharge: Levaquin x 3days  Changed Medications at Discharge: n/a  Discontinued Medications at Discharge: n/a    Medication reconciliation was performed with patient, who verbalizes understanding of administration of home medications. There were no barriers to obtaining medications identified at this time. Referral to Pharm D needed: no     Current Outpatient Medications   Medication Sig    guaiFENesin ER (MUCINEX) 600 mg ER tablet Take 600 mg by mouth every twelve (12) hours.  bisacodyl (DULCOLAX, BISACODYL,) 5 mg EC tablet Take 5 mg by mouth daily as needed for Constipation.  cyclobenzaprine (FLEXERIL) 10 mg tablet TAKE ONE TABLET BY MOUTH THREE TIMES DAILY AS NEEDED FOR MUSCLE SPASM    carvedilol (COREG) 6.25 mg tablet Take 12.5 mg by mouth every other day.  sildenafil citrate (VIAGRA) 100 mg tablet Take 1 Tab by mouth as needed.  losartan (COZAAR) 25 mg tablet Take  by mouth every other day.  calcium acetate (PHOSLO) 667 mg cap Take 3 Caps by mouth three (3) times daily (with meals). Patient claims to take 1cap TID    aspirin delayed-release 81 mg tablet Take 81 mg by mouth daily.  acetaminophen (TYLENOL) 325 mg tablet Take 2 Tabs by mouth every six (6) hours. Indications: PAIN    albuterol (PROVENTIL HFA, VENTOLIN HFA, PROAIR HFA) 90 mcg/actuation inhaler Take 2 Puffs by inhalation every four (4) hours as needed for Wheezing.     levoFLOXacin (LEVAQUIN) 750 mg tablet Take 1 Tab by mouth daily.  fluticasone propion-salmeterol (ADVAIR DISKUS) 250-50 mcg/dose diskus inhaler Take 1 Puff by inhalation every twelve (12) hours. Not taking because of cost; never filled prescription     No current facility-administered medications for this visit. There are no discontinued medications.     [facility] follow up appointment(s):   Future Appointments   Date Time Provider Gerardo Clancy   5/7/2019  7:00 AM [facility] CT MAIN 1 HRCT [facility]   5/9/2019 11:15 AM MD Genny Santillan 480      Non-[facility] follow up appointment(s): n/a  [facility]:  information provided as a resource       Goals     None

## 2019-05-09 ENCOUNTER — DOCUMENTATION ONLY (OUTPATIENT)
Dept: INTERNAL MEDICINE CLINIC | Age: 64
End: 2019-05-09

## 2019-05-09 ENCOUNTER — OFFICE VISIT (OUTPATIENT)
Dept: INTERNAL MEDICINE CLINIC | Age: 64
End: 2019-05-09

## 2019-05-09 ENCOUNTER — PATIENT OUTREACH (OUTPATIENT)
Dept: INTERNAL MEDICINE CLINIC | Age: 64
End: 2019-05-09

## 2019-05-09 VITALS
RESPIRATION RATE: 16 BRPM | HEIGHT: 67 IN | DIASTOLIC BLOOD PRESSURE: 78 MMHG | HEART RATE: 108 BPM | SYSTOLIC BLOOD PRESSURE: 120 MMHG | OXYGEN SATURATION: 94 % | WEIGHT: 175 LBS | BODY MASS INDEX: 27.47 KG/M2 | TEMPERATURE: 98.5 F

## 2019-05-09 DIAGNOSIS — J18.9 COMMUNITY ACQUIRED PNEUMONIA OF RIGHT LUNG, UNSPECIFIED PART OF LUNG: ICD-10-CM

## 2019-05-09 DIAGNOSIS — J45.20 MILD INTERMITTENT ASTHMA WITHOUT COMPLICATION: ICD-10-CM

## 2019-05-09 DIAGNOSIS — Z99.2 STAGE 5 CHRONIC KIDNEY DISEASE ON CHRONIC DIALYSIS (HCC): ICD-10-CM

## 2019-05-09 DIAGNOSIS — I10 ESSENTIAL HYPERTENSION: Primary | ICD-10-CM

## 2019-05-09 DIAGNOSIS — N18.6 STAGE 5 CHRONIC KIDNEY DISEASE ON CHRONIC DIALYSIS (HCC): ICD-10-CM

## 2019-05-09 NOTE — PROGRESS NOTES
Chief Complaint   Patient presents with    Pneumonia     1. Have you been to the ER, urgent care clinic since your last visit? Hospitalized since your last visit? Yes When: 05/01/19 [facility] Reason for visit: CAP    2. Have you seen or consulted any other health care providers outside of the [address] [place] [address] since your last visit? Include any pap smears or colon screening.  No

## 2019-05-09 NOTE — PROGRESS NOTES
This writer has met with Mr Burgess Sandifer during [place] visit. Pt tells this writer he is much improved since out initial conversation. He is finding his words fluently, Tracey Gtz agrees in comparison to first conversation, and denies any further times of memory loss. This writer learns pt's wife of 9 years is a smoker. He has asked her many times to stop. They sleep normally separately but last evening he slept in her room and woke in early am hours with obvious wheezing that later improved. She also smokes in his car. This writer has suggested pt asking his wife to join him for his advanced heart failure and/or pulmonary f/u so that she can hear first hand how second hand smoke is effecting him (Dx of Pulmonary Hypertension and dCHF),pt agrees. This further discusses ACP w/ Mr. Burgess Sandifer, blue folder given and contact information for facilitator.     ====Jose C Sherman Invitation====    Patient was invited to [facility] on this date and given the information folder for review. Recommended appointment with Jose C Sherman facilitator for ACP conversation regarding advance directives. [x] Yes  [] No  Referral sent to [facility] Choices team member or Coordinator for follow-up    [] Yes  [x] No  Patient scheduled an appointment.        Site of Referral: [facility]

## 2019-05-09 NOTE — PATIENT INSTRUCTIONS
[employer] Activation    Thank you for requesting access to [employer]. Please follow the instructions below to securely access and download your online medical record. [employer] allows you to send messages to your doctor, view your test results, renew your prescriptions, schedule appointments, and more. How Do I Sign Up? 1. In your internet browser, go to [email or web address]  2. Click on the First Time User? Click Here link in the Sign In box. You will be redirect to the New Member Sign Up page. 3. Enter your [employer] Access Code exactly as it appears below. You will not need to use this code after youve completed the sign-up process. If you do not sign up before the expiration date, you must request a new code. [employer] Access Code: [email or web address]  Expires: 2019 12:08 PM (This is the date your [employer] access code will )    4. Enter the last four digits of your Social Security Number (xxxx) and Date of Birth (mm/dd/yyyy) as indicated and click Submit. You will be taken to the next sign-up page. 5. Create a [employer] ID. This will be your [employer] login ID and cannot be changed, so think of one that is secure and easy to remember. 6. Create a [employer] password. You can change your password at any time. 7. Enter your Password Reset Question and Answer. This can be used at a later time if you forget your password. 8. Enter your e-mail address. You will receive e-mail notification when new information is available in [address]. 9. Click Sign Up. You can now view and download portions of your medical record. 10. Click the Download Summary menu link to download a portable copy of your medical information. Additional Information    If you have questions, please visit the Frequently Asked Questions section of the [employer] website at [email or web address] Remember, [employer] is NOT to be used for urgent needs. For medical emergencies, dial 911.

## 2019-05-09 NOTE — PROGRESS NOTES
Chief Complaint   Patient presents with    Pneumonia      Varsha Parikh is a 70 y.o. male and presents with Pneumonia    Subjective:    He was seen in the hospital and felt to have a pneumonia,treated and released. Hypertension Review:  The patient has essential hypertension  Diet and Lifestyle: generally follows a  low sodium diet, exercises sporadically  Home BP Monitoring: is not measured at home. Pertinent ROS: taking medications as instructed, no medication side effects noted, no TIA's, no chest pain on exertion, no dyspnea on exertion, no swelling of ankles. Chronic Renal Disease Review:  HPI: Varsha Parikh, ([id number]) is a 70 y.o. male who returns for follow up of   chronic renal disease caused by unknown etiology. te patient  is followed by renal  The patient has experienced the following symptoms: no problems   The patient has not experienced any of the following symptoms: no problems   Prescribed diet is JORDY   The following actions have been prescribed for slowing the   progression of CRF: cardiovascular risk factor reduction including male gender, hypertension   The patientis  on dialysis,his last session was yesterday. Asthma Review:  The patient is being seen for follow up of asthma,  currently stable. Asthma symptoms occur: infrequently. Wheezing when present is described as mild and easily relieved with rescue bronchodilator. The patient reports use of a steroid inhaler. Frequency of use of quick-relief meds: rarely. Regimen compliance: The patient reports adherence to this regimen. Review of Systems  Constitutional: negative for fevers, chills, anorexia and weight loss  Eyes:   negative for visual disturbance and irritation  ENT:   negative for tinnitus,sore throat,nasal congestion,ear pains. hoarseness  Respiratory:  negative for cough, hemoptysis, dyspnea,wheezing  CV:   negative for chest pain, palpitations, lower extremity edema  GI:   negative for nausea, vomiting, diarrhea, abdominal pain,melena  Endo:               negative for polyuria,polydipsia,polyphagia,heat intolerance  Genitourinary: negative for frequency, dysuria and hematuria  Integument:  negative for rash and pruritus  Hematologic:  negative for easy bruising and gum/nose bleeding  Musculoskel: negative for myalgias, arthralgias, back pain, muscle weakness, joint pain  Neurological:  negative for headaches, dizziness, vertigo, memory problems and gait   Behavl/Psych: negative for feelings of anxiety, depression, mood changes    Past Medical History:   Diagnosis Date    Adverse effect of anesthesia     \"MY BLOOD PRESSURE DROPS ,\"    Alzheimer's disease 2/18/2011    PT DENIES    Chronic kidney disease     KIDNEYS FAILED R/T HTN    Chronic pain     LEFT HIP PAIN    Heart failure ([facility] [address] 6/2015    CHF    Hemodialysis patient ([facility] [address]     M-W-F    Osteoarthrosis, unspecified whether generalized or localized, unspecified site 2/18/2011    Other unknown and unspecified cause of morbidity or mortality     PT DOSES OFF FREQUENTLY    PUD (peptic ulcer disease)     Renal Disease 2/18/2011    Right inguinal hernia 3/1/2016    Unspecified asthma(493.90) 2/18/2011    Unspecified essential hypertension 2/18/2011    Ventral incisional hernia 3/1/2016     Past Surgical History:   Procedure Laterality Date    CARDIAC SURG PROCEDURE UNLIST  4/29/15    CARDIAC CATHGERIZATION    HX GI      COLONOSCOPY    HX HEENT      TONSILLECTOMY    HX HERNIA REPAIR  3/8/16    Repair of right inguinal hernia with mesh,Repair of ventral incisional hernia with mesh    HX ORTHOPAEDIC Right 10/13/15     THR    HX ORTHOPAEDIC      left hip    HX OTHER SURGICAL  10/11/2016    revision of AV fistula left arm with repair of pseudoaneurysm    HX SMALL BOWEL RESECTION      HX UROLOGICAL Bilateral 2005    REMOVAL OF KIDNEYS    HX VASCULAR ACCESS      LT ARM WITH PORTS FOR DYALISIS     Social History     Socioeconomic History    Marital status:      Spouse name: Not on file    Number of children: Not on file    Years of education: Not on file    Highest education level: Not on file   Tobacco Use    Smoking status: Never Smoker    Smokeless tobacco: Never Used   Substance and Sexual Activity    Alcohol use: No    Drug use: No     Family History   Problem Relation Age of Onset    Cancer Mother         BREAST    Cancer Sister         THROAT    Stroke Brother     Anesth Problems Neg Hx      Current Outpatient Medications   Medication Sig Dispense Refill    guaiFENesin ER (MUCINEX) 600 mg ER tablet Take 600 mg by mouth every twelve (12) hours.  bisacodyl (DULCOLAX, BISACODYL,) 5 mg EC tablet Take 5 mg by mouth daily as needed for Constipation.  cyclobenzaprine (FLEXERIL) 10 mg tablet TAKE ONE TABLET BY MOUTH THREE TIMES DAILY AS NEEDED FOR MUSCLE SPASM 90 Tab 0    carvedilol (COREG) 6.25 mg tablet Take 12.5 mg by mouth every other day. 60 Tab 12    sildenafil citrate (VIAGRA) 100 mg tablet Take 1 Tab by mouth as needed. 8 Tab 12    losartan (COZAAR) 25 mg tablet Take  by mouth every other day.  calcium acetate (PHOSLO) 667 mg cap Take 3 Caps by mouth three (3) times daily (with meals). Patient claims to take 1cap TID      aspirin delayed-release 81 mg tablet Take 81 mg by mouth daily.  acetaminophen (TYLENOL) 325 mg tablet Take 2 Tabs by mouth every six (6) hours. Indications: PAIN 60 Tab 0    albuterol (PROVENTIL HFA, VENTOLIN HFA, PROAIR HFA) 90 mcg/actuation inhaler Take 2 Puffs by inhalation every four (4) hours as needed for Wheezing. 1 Inhaler 12    fluticasone propion-salmeterol (ADVAIR DISKUS) 250-50 mcg/dose diskus inhaler Take 1 Puff by inhalation every twelve (12) hours.  Not taking because of cost; never filled prescription       Allergies   Allergen Reactions    Milk Anaphylaxis    Shellfish Derived Anaphylaxis    Benadryl [Diphenhydramine Hcl] Nausea and Vomiting    Zofran Odt [Ondansetron] Nausea and Vomiting    Iodinated Contrast- Oral And Iv Dye Other (comments)    Peanut Shortness of Breath    Penicillins Unknown (comments)       Objective:  Visit Vitals  /78 (BP 1 Location: Right arm, BP Patient Position: Sitting)   Pulse (!) 108   Temp 98.5 °F (36.9 °C) (Oral)   Resp 16   Ht 5' 7\" (1.702 m)   Wt 175 lb (79.4 kg)   SpO2 94%   BMI 27.41 kg/m²     Physical Exam:   General appearance - alert, well appearing, and in no distress  Mental status - alert, oriented to person, place, and time  EYE-ILIANA, EOMI, corneas normal, no foreign bodies  ENT-ENT exam normal, no neck nodes or sinus tenderness  Nose - normal and patent, no erythema, discharge or polyps  Mouth - mucous membranes moist, pharynx normal without lesions  Neck - supple, no significant adenopathy   Chest - clear to auscultation, no wheezes, rales or rhonchi, symmetric air entry   Heart - normal rate, regular rhythm, normal S1, S2, no murmurs, rubs, clicks or gallops   Abdomen - soft, nontender, nondistended, no masses or organomegaly  Lymph- no adenopathy palpable  Ext-peripheral pulses normal, no pedal edema, no clubbing or cyanosis  Skin-Warm and dry.  no hyperpigmentation, vitiligo, or suspicious lesions  Neuro -alert, oriented, normal speech, no focal findings or movement disorder noted  Neck-normal C-spine, no tenderness, full ROM without pain  Feet-no nail deformities or callus formation with good pulses noted      Results for orders placed or performed during the hospital encounter of 05/01/19   CULTURE, RESPIRATORY/SPUTUM/BRONCH W GRAM STAIN   Result Value Ref Range    Special Requests: NO SPECIAL REQUESTS      GRAM STAIN FEW WBCS SEEN      GRAM STAIN OCCASIONAL GRAM POSITIVE COCCI      Culture result: MODERATE NORMAL RESPIRATORY DEVON     CBC WITH AUTOMATED DIFF   Result Value Ref Range    WBC 6.6 4.1 - 11.1 K/uL    RBC 3.04 (L) 4.10 - 5.70 M/uL    HGB 9.9 (L) 12.1 - 17.0 g/dL    HCT 32.6 (L) 36.6 - 50.3 % .2 (H) 80.0 - 99.0 FL    MCH 32.6 26.0 - 34.0 PG    MCHC 30.4 30.0 - 36.5 g/dL    RDW 12.4 11.5 - 14.5 %    PLATELET 973 740 - 111 K/uL    MPV 10.3 8.9 - 12.9 FL    NRBC 0.0 0  WBC    ABSOLUTE NRBC 0.00 0.00 - 0.01 K/uL    NEUTROPHILS 62 32 - 75 %    LYMPHOCYTES 20 12 - 49 %    MONOCYTES 10 5 - 13 %    EOSINOPHILS 7 0 - 7 %    BASOPHILS 1 0 - 1 %    IMMATURE GRANULOCYTES 0 0.0 - 0.5 %    ABS. NEUTROPHILS 4.1 1.8 - 8.0 K/UL    ABS. LYMPHOCYTES 1.3 0.8 - 3.5 K/UL    ABS. MONOCYTES 0.7 0.0 - 1.0 K/UL    ABS. EOSINOPHILS 0.5 (H) 0.0 - 0.4 K/UL    ABS. BASOPHILS 0.0 0.0 - 0.1 K/UL    ABS. IMM. GRANS. 0.0 0.00 - 0.04 K/UL    DF AUTOMATED     RENAL FUNCTION PANEL   Result Value Ref Range    Sodium 138 136 - 145 mmol/L    Potassium 3.3 (L) 3.5 - 5.1 mmol/L    Chloride 98 97 - 108 mmol/L    CO2 33 (H) 21 - 32 mmol/L    Anion gap 7 5 - 15 mmol/L    Glucose 89 65 - 100 mg/dL    BUN 49 (H) 6 - 20 MG/DL    Creatinine 8.14 (H) 0.70 - 1.30 MG/DL    BUN/Creatinine ratio 6 (L) 12 - 20      GFR est AA 8 (L) >60 ml/min/1.73m2    GFR est non-AA 7 (L) >60 ml/min/1.73m2    Calcium 9.6 8.5 - 10.1 MG/DL    Phosphorus 4.6 2.6 - 4.7 MG/DL    Albumin 3.3 (L) 3.5 - 5.0 g/dL   CBC WITH AUTOMATED DIFF   Result Value Ref Range    WBC 8.1 4.1 - 11.1 K/uL    RBC 3.11 (L) 4.10 - 5.70 M/uL    HGB 10.0 (L) 12.1 - 17.0 g/dL    HCT 32.6 (L) 36.6 - 50.3 %    .8 (H) 80.0 - 99.0 FL    MCH 32.2 26.0 - 34.0 PG    MCHC 30.7 30.0 - 36.5 g/dL    RDW 12.7 11.5 - 14.5 %    PLATELET 800 225 - 253 K/uL    MPV 10.6 8.9 - 12.9 FL    NRBC 0.0 0  WBC    ABSOLUTE NRBC 0.00 0.00 - 0.01 K/uL    NEUTROPHILS 56 32 - 75 %    LYMPHOCYTES 24 12 - 49 %    MONOCYTES 10 5 - 13 %    EOSINOPHILS 8 (H) 0 - 7 %    BASOPHILS 1 0 - 1 %    IMMATURE GRANULOCYTES 1 (H) 0.0 - 0.5 %    ABS. NEUTROPHILS 4.6 1.8 - 8.0 K/UL    ABS. LYMPHOCYTES 1.9 0.8 - 3.5 K/UL    ABS. MONOCYTES 0.8 0.0 - 1.0 K/UL    ABS. EOSINOPHILS 0.6 (H) 0.0 - 0.4 K/UL    ABS.  BASOPHILS 0.1 0.0 - 0.1 K/UL    ABS. IMM. GRANS. 0.1 (H) 0.00 - 0.04 K/UL    DF AUTOMATED     METABOLIC PANEL, BASIC   Result Value Ref Range    Sodium 136 136 - 145 mmol/L    Potassium 3.2 (L) 3.5 - 5.1 mmol/L    Chloride 97 97 - 108 mmol/L    CO2 27 21 - 32 mmol/L    Anion gap 12 5 - 15 mmol/L    Glucose 99 65 - 100 mg/dL    BUN 58 (H) 6 - 20 MG/DL    Creatinine 8.99 (H) 0.70 - 1.30 MG/DL    BUN/Creatinine ratio 6 (L) 12 - 20      GFR est AA 7 (L) >60 ml/min/1.73m2    GFR est non-AA 6 (L) >60 ml/min/1.73m2    Calcium 9.9 8.5 - 10.1 MG/DL   C REACTIVE PROTEIN, QT   Result Value Ref Range    C-Reactive protein 1.51 (H) 0.00 - 0.60 mg/dL       Assessment/Plan:  No diagnosis found. No orders of the defined types were placed in this encounter. lose weight, follow low fat diet, follow low salt diet, continue present plan,Take 81mg aspirin daily  Patient Instructions   [facility] Activation    Thank you for requesting access to [employer]. Please follow the instructions below to securely access and download your online medical record. [facility] allows you to send messages to your doctor, view your test results, renew your prescriptions, schedule appointments, and more. How Do I Sign Up? 1. In your internet browser, go to [email or web address]  2. Click on the First Time User? Click Here link in the Sign In box. You will be redirect to the New Member Sign Up page. 3. Enter your [employer] Access Code exactly as it appears below. You will not need to use this code after youve completed the sign-up process. If you do not sign up before the expiration date, you must request a new code. [employer] Access Code: [email or web address]  Expires: 2019 12:08 PM (This is the date your [employer] access code will )    4. Enter the last four digits of your Social Security Number (xxxx) and Date of Birth (mm/dd/yyyy) as indicated and click Submit. You will be taken to the next sign-up page. 5. Create a MyChart ID.  This will be your [employer] login ID and cannot be changed, so think of one that is secure and easy to remember. 6. Create a [employer] password. You can change your password at any time. 7. Enter your Password Reset Question and Answer. This can be used at a later time if you forget your password. 8. Enter your e-mail address. You will receive e-mail notification when new information is available in [address] 9. Click Sign Up. You can now view and download portions of your medical record. 10. Click the Download Summary menu link to download a portable copy of your medical information. Additional Information    If you have questions, please visit the Frequently Asked Questions section of the [employer] website at [email or web address] Remember, [employer] is NOT to be used for urgent needs. For medical emergencies, dial 911. Follow-up and Dispositions    · Return in about 3 months (around 8/9/2019). I have reviewed with the patient details of the assessment and plan and all questions were answered. Relevent patient education was performed. The most recent lab findings were reviewed with the patient. An After Visit Summary was printed and given to the patient. 1. Have you been to the ER, urgent care clinic since your last visit? Hospitalized since your last visit? No    2. Have you seen or consulted any other health care providers outside of the [address] [place] [address] since your last visit? Include any pap smears or colon screening. No    Pt would like to f/u on blood work from last visit.

## 2019-05-13 NOTE — ACP (ADVANCE CARE PLANNING)
====Jose C Sherman Invitation====    Patient was invited to [facility] on this date and given the information folder for review. Recommended appointment with Jose C Sherman facilitator for ACP conversation regarding advance directives. [x] Yes  [] No  Referral sent to [facility] Choices team member or Coordinator for follow-up    [] Yes  [x] No  Patient scheduled an appointment.        Site of Referral: [facility]

## 2019-05-16 ENCOUNTER — OFFICE VISIT (OUTPATIENT)
Dept: INTERNAL MEDICINE CLINIC | Age: 64
End: 2019-05-16

## 2019-05-16 VITALS
HEIGHT: 67 IN | OXYGEN SATURATION: 93 % | DIASTOLIC BLOOD PRESSURE: 70 MMHG | BODY MASS INDEX: 27.94 KG/M2 | WEIGHT: 178 LBS | TEMPERATURE: 98.1 F | RESPIRATION RATE: 19 BRPM | SYSTOLIC BLOOD PRESSURE: 110 MMHG | HEART RATE: 104 BPM

## 2019-05-16 DIAGNOSIS — G47.33 OBSTRUCTIVE SLEEP APNEA SYNDROME: ICD-10-CM

## 2019-05-16 DIAGNOSIS — E78.00 HYPERCHOLESTEREMIA: ICD-10-CM

## 2019-05-16 DIAGNOSIS — I10 ESSENTIAL HYPERTENSION: Primary | ICD-10-CM

## 2019-05-16 DIAGNOSIS — J42 CHRONIC BRONCHITIS, UNSPECIFIED CHRONIC BRONCHITIS TYPE (HCC): ICD-10-CM

## 2019-05-16 RX ORDER — BUDESONIDE AND FORMOTEROL FUMARATE DIHYDRATE 160; 4.5 UG/1; UG/1
2 AEROSOL RESPIRATORY (INHALATION) 2 TIMES DAILY
Qty: 1 INHALER | Refills: 12
Start: 2019-05-16 | End: 2020-11-12 | Stop reason: ALTCHOICE

## 2019-05-16 RX ORDER — BUDESONIDE AND FORMOTEROL FUMARATE DIHYDRATE 160; 4.5 UG/1; UG/1
AEROSOL RESPIRATORY (INHALATION)
COMMUNITY
Start: 2019-05-14 | End: 2019-05-16 | Stop reason: SDUPTHER

## 2019-05-16 NOTE — PROGRESS NOTES
Chief Complaint   Patient presents with   [address] [place] [address] Rd is a 70 y.o. male and presents with Transitions Of Care    Subjective:    He has a past history of pneumonia. He was told he has copd    Hypertension Review:  The patient has essential hypertension  Diet and Lifestyle: generally follows a  low sodium diet, exercises sporadically  Home BP Monitoring: is not measured at home. Pertinent ROS: taking medications as instructed, no medication side effects noted, no TIA's, no chest pain on exertion, no dyspnea on exertion, no swelling of ankles. Chronic Renal Disease Review:  HPI: Candida Gorman, ([id number]) is a 70 y.o. male who returns for follow up of   chronic renal disease caused by unknown etiology. te patient  is followed by renal  The patient has experienced the following symptoms: no problems   The patient has not experienced any of the following symptoms: no problems   Prescribed diet is JORDY   The following actions have been prescribed for slowing the   progression of CRF: cardiovascular risk factor reduction including male gender, hypertension   The patientis  on dialysis,his last session was yesterday. COPD REVIEW:  The patient is being seen for follow up of COPD,the patient has been stable  Oxygen: He currently is not on home oxygen therapy. Symptoms: chronic dyspnea: severity = not present: course of sx: stable. Patient uses 2 pillows at night. Patient does continue to smoke cigarettes. Review of Systems  Constitutional: negative for fevers, chills, anorexia and weight loss  Eyes:   negative for visual disturbance and irritation  ENT:   negative for tinnitus,sore throat,nasal congestion,ear pains. hoarseness  Respiratory:  negative for cough, hemoptysis, dyspnea,wheezing  CV:   negative for chest pain, palpitations, lower extremity edema  GI:   negative for nausea, vomiting, diarrhea, abdominal pain,melena  Endo:               negative for polyuria,polydipsia,polyphagia,heat intolerance  Genitourinary: negative for frequency, dysuria and hematuria  Integument:  negative for rash and pruritus  Hematologic:  negative for easy bruising and gum/nose bleeding  Musculoskel: negative for myalgias, arthralgias, back pain, muscle weakness, joint pain  Neurological:  negative for headaches, dizziness, vertigo, memory problems and gait   Behavl/Psych: negative for feelings of anxiety, depression, mood changes    Past Medical History:   Diagnosis Date    Adverse effect of anesthesia     \"MY BLOOD PRESSURE DROPS ,\"    Alzheimer's disease 2/18/2011    PT DENIES    Chronic kidney disease     KIDNEYS FAILED R/T HTN    Chronic pain     LEFT HIP PAIN    Heart failure ([facility] [address] 6/2015    CHF    Hemodialysis patient ([facility] [address]     M-W-F    Osteoarthrosis, unspecified whether generalized or localized, unspecified site 2/18/2011    Other unknown and unspecified cause of morbidity or mortality     PT DOSES OFF FREQUENTLY    PUD (peptic ulcer disease)     Renal Disease 2/18/2011    Right inguinal hernia 3/1/2016    Unspecified asthma(493.90) 2/18/2011    Unspecified essential hypertension 2/18/2011    Ventral incisional hernia 3/1/2016     Past Surgical History:   Procedure Laterality Date    CARDIAC SURG PROCEDURE UNLIST  4/29/15    CARDIAC CATHGERIZATION    HX GI      COLONOSCOPY    HX HEENT      TONSILLECTOMY    HX HERNIA REPAIR  3/8/16    Repair of right inguinal hernia with mesh,Repair of ventral incisional hernia with mesh    HX ORTHOPAEDIC Right 10/13/15     THR    HX ORTHOPAEDIC      left hip    HX OTHER SURGICAL  10/11/2016    revision of AV fistula left arm with repair of pseudoaneurysm    HX SMALL BOWEL RESECTION      HX UROLOGICAL Bilateral 2005    REMOVAL OF KIDNEYS    HX VASCULAR ACCESS      LT ARM WITH PORTS FOR DYALISIS     Social History     Socioeconomic History    Marital status:      Spouse name: Not on file    Number of children: Not on file    Years of education: Not on file    Highest education level: Not on file   Tobacco Use    Smoking status: Never Smoker    Smokeless tobacco: Never Used   Substance and Sexual Activity    Alcohol use: No    Drug use: No     Family History   Problem Relation Age of Onset    Cancer Mother         BREAST    Cancer Sister         THROAT    Stroke Brother     Anesth Problems Neg Hx      Current Outpatient Medications   Medication Sig Dispense Refill    SYMBICORT 160-4.5 mcg/actuation HFAA Take 2 Puffs by inhalation two (2) times a day. 1 Inhaler 12    guaiFENesin ER (MUCINEX) 600 mg ER tablet Take 600 mg by mouth every twelve (12) hours.  bisacodyl (DULCOLAX, BISACODYL,) 5 mg EC tablet Take 5 mg by mouth daily as needed for Constipation.  cyclobenzaprine (FLEXERIL) 10 mg tablet TAKE ONE TABLET BY MOUTH THREE TIMES DAILY AS NEEDED FOR MUSCLE SPASM 90 Tab 0    carvedilol (COREG) 6.25 mg tablet Take 12.5 mg by mouth every other day. 60 Tab 12    sildenafil citrate (VIAGRA) 100 mg tablet Take 1 Tab by mouth as needed. 8 Tab 12    losartan (COZAAR) 25 mg tablet Take  by mouth every other day.  calcium acetate (PHOSLO) 667 mg cap Take 3 Caps by mouth three (3) times daily (with meals). Patient claims to take 1cap TID      aspirin delayed-release 81 mg tablet Take 81 mg by mouth daily.  acetaminophen (TYLENOL) 325 mg tablet Take 2 Tabs by mouth every six (6) hours. Indications: PAIN 60 Tab 0    fluticasone propion-salmeterol (ADVAIR DISKUS) 250-50 mcg/dose diskus inhaler Take 1 Puff by inhalation every twelve (12) hours. Not taking because of cost; never filled prescription      albuterol (PROVENTIL HFA, VENTOLIN HFA, PROAIR HFA) 90 mcg/actuation inhaler Take 2 Puffs by inhalation every four (4) hours as needed for Wheezing.  1 Inhaler 12     Allergies   Allergen Reactions    Milk Anaphylaxis    Shellfish Derived Anaphylaxis    Benadryl [Diphenhydramine Hcl] Nausea and Vomiting    Zofran Odt [Ondansetron] Nausea and Vomiting    Iodinated Contrast- Oral And Iv Dye Other (comments)    Peanut Shortness of Breath    Penicillins Unknown (comments)       Objective:  Visit Vitals  /70 (BP 1 Location: Right arm, BP Patient Position: Sitting)   Pulse (!) 104   Temp 98.1 °F (36.7 °C) (Oral)   Resp 19   Ht 5' 7\" (1.702 m)   Wt 178 lb (80.7 kg)   SpO2 93%   BMI 27.88 kg/m²     Physical Exam:   General appearance - alert, well appearing, and in no distress  Mental status - alert, oriented to person, place, and time  EYE-ILIANA, EOMI, corneas normal, no foreign bodies  ENT-ENT exam normal, no neck nodes or sinus tenderness  Nose - normal and patent, no erythema, discharge or polyps  Mouth - mucous membranes moist, pharynx normal without lesions  Neck - supple, no significant adenopathy   Chest - clear to auscultation, no wheezes, rales or rhonchi, symmetric air entry   Heart - normal rate, regular rhythm, normal S1, S2, no murmurs, rubs, clicks or gallops   Abdomen - soft, nontender, nondistended, no masses or organomegaly  Lymph- no adenopathy palpable  Ext-peripheral pulses normal, no pedal edema, no clubbing or cyanosis  Skin-Warm and dry.  no hyperpigmentation, vitiligo, or suspicious lesions  Neuro -alert, oriented, normal speech, no focal findings or movement disorder noted  Neck-normal C-spine, no tenderness, full ROM without pain  Feet-no nail deformities or callus formation with good pulses noted      Results for orders placed or performed during the hospital encounter of 05/01/19   CULTURE, RESPIRATORY/SPUTUM/BRONCH W GRAM STAIN   Result Value Ref Range    Special Requests: NO SPECIAL REQUESTS      GRAM STAIN FEW WBCS SEEN      GRAM STAIN OCCASIONAL GRAM POSITIVE COCCI      Culture result: MODERATE NORMAL RESPIRATORY DEVON     CBC WITH AUTOMATED DIFF   Result Value Ref Range    WBC 6.6 4.1 - 11.1 K/uL    RBC 3.04 (L) 4.10 - 5.70 M/uL    HGB 9.9 (L) 12.1 - 17.0 g/dL    HCT 32.6 (L) 36.6 - 50.3 %    .2 (H) 80.0 - 99.0 FL    MCH 32.6 26.0 - 34.0 PG    MCHC 30.4 30.0 - 36.5 g/dL    RDW 12.4 11.5 - 14.5 %    PLATELET 145 138 - 073 K/uL    MPV 10.3 8.9 - 12.9 FL    NRBC 0.0 0  WBC    ABSOLUTE NRBC 0.00 0.00 - 0.01 K/uL    NEUTROPHILS 62 32 - 75 %    LYMPHOCYTES 20 12 - 49 %    MONOCYTES 10 5 - 13 %    EOSINOPHILS 7 0 - 7 %    BASOPHILS 1 0 - 1 %    IMMATURE GRANULOCYTES 0 0.0 - 0.5 %    ABS. NEUTROPHILS 4.1 1.8 - 8.0 K/UL    ABS. LYMPHOCYTES 1.3 0.8 - 3.5 K/UL    ABS. MONOCYTES 0.7 0.0 - 1.0 K/UL    ABS. EOSINOPHILS 0.5 (H) 0.0 - 0.4 K/UL    ABS. BASOPHILS 0.0 0.0 - 0.1 K/UL    ABS. IMM. GRANS. 0.0 0.00 - 0.04 K/UL    DF AUTOMATED     RENAL FUNCTION PANEL   Result Value Ref Range    Sodium 138 136 - 145 mmol/L    Potassium 3.3 (L) 3.5 - 5.1 mmol/L    Chloride 98 97 - 108 mmol/L    CO2 33 (H) 21 - 32 mmol/L    Anion gap 7 5 - 15 mmol/L    Glucose 89 65 - 100 mg/dL    BUN 49 (H) 6 - 20 MG/DL    Creatinine 8.14 (H) 0.70 - 1.30 MG/DL    BUN/Creatinine ratio 6 (L) 12 - 20      GFR est AA 8 (L) >60 ml/min/1.73m2    GFR est non-AA 7 (L) >60 ml/min/1.73m2    Calcium 9.6 8.5 - 10.1 MG/DL    Phosphorus 4.6 2.6 - 4.7 MG/DL    Albumin 3.3 (L) 3.5 - 5.0 g/dL   CBC WITH AUTOMATED DIFF   Result Value Ref Range    WBC 8.1 4.1 - 11.1 K/uL    RBC 3.11 (L) 4.10 - 5.70 M/uL    HGB 10.0 (L) 12.1 - 17.0 g/dL    HCT 32.6 (L) 36.6 - 50.3 %    .8 (H) 80.0 - 99.0 FL    MCH 32.2 26.0 - 34.0 PG    MCHC 30.7 30.0 - 36.5 g/dL    RDW 12.7 11.5 - 14.5 %    PLATELET 613 936 - 934 K/uL    MPV 10.6 8.9 - 12.9 FL    NRBC 0.0 0  WBC    ABSOLUTE NRBC 0.00 0.00 - 0.01 K/uL    NEUTROPHILS 56 32 - 75 %    LYMPHOCYTES 24 12 - 49 %    MONOCYTES 10 5 - 13 %    EOSINOPHILS 8 (H) 0 - 7 %    BASOPHILS 1 0 - 1 %    IMMATURE GRANULOCYTES 1 (H) 0.0 - 0.5 %    ABS. NEUTROPHILS 4.6 1.8 - 8.0 K/UL    ABS. LYMPHOCYTES 1.9 0.8 - 3.5 K/UL    ABS. MONOCYTES 0.8 0.0 - 1.0 K/UL    ABS.  EOSINOPHILS 0.6 (H) 0.0 - 0.4 K/UL ABS. BASOPHILS 0.1 0.0 - 0.1 K/UL    ABS. IMM. GRANS. 0.1 (H) 0.00 - 0.04 K/UL    DF AUTOMATED     METABOLIC PANEL, BASIC   Result Value Ref Range    Sodium 136 136 - 145 mmol/L    Potassium 3.2 (L) 3.5 - 5.1 mmol/L    Chloride 97 97 - 108 mmol/L    CO2 27 21 - 32 mmol/L    Anion gap 12 5 - 15 mmol/L    Glucose 99 65 - 100 mg/dL    BUN 58 (H) 6 - 20 MG/DL    Creatinine 8.99 (H) 0.70 - 1.30 MG/DL    BUN/Creatinine ratio 6 (L) 12 - 20      GFR est AA 7 (L) >60 ml/min/1.73m2    GFR est non-AA 6 (L) >60 ml/min/1.73m2    Calcium 9.9 8.5 - 10.1 MG/DL   C REACTIVE PROTEIN, QT   Result Value Ref Range    C-Reactive protein 1.51 (H) 0.00 - 0.60 mg/dL       Assessment/Plan:  No diagnosis found. Orders Placed This Encounter    DISCONTD: SYMBICORT 160-4.5 mcg/actuation HFAA    SYMBICORT 160-4.5 mcg/actuation HFAA     Sig: Take 2 Puffs by inhalation two (2) times a day. Dispense:  1 Inhaler     Refill:  12     lose weight, follow low fat diet, follow low salt diet, continue present plan,Take 81mg aspirin daily  Patient Instructions   [employer]Coin Activation    Thank you for requesting access to [employer]in. Please follow the instructions below to securely access and download your online medical record. [employer]Coin allows you to send messages to your doctor, view your test results, renew your prescriptions, schedule appointments, and more. How Do I Sign Up? 1. In your internet browser, go to [email or web address]  2. Click on the First Time User? Click Here link in the Sign In box. You will be redirect to the New Member Sign Up page. 3. Enter your [employer]Coin Access Code exactly as it appears below. You will not need to use this code after youve completed the sign-up process. If you do not sign up before the expiration date, you must request a new code. [employer]Coin Access Code: [email or web address]  Expires: 2019 12:08 PM (This is the date your vidCoin access code will )    4.  Enter the last four digits of your Social Security Number (xxxx) and Date of Birth (mm/dd/yyyy) as indicated and click Submit. You will be taken to the next sign-up page. 5. Create a PositiveIDt ID. This will be your [employer] login ID and cannot be changed, so think of one that is secure and easy to remember. 6. Create a [employer] password. You can change your password at any time. 7. Enter your Password Reset Question and Answer. This can be used at a later time if you forget your password. 8. Enter your e-mail address. You will receive e-mail notification when new information is available in [address] 9. Click Sign Up. You can now view and download portions of your medical record. 10. Click the Download Summary menu link to download a portable copy of your medical information. Additional Information    If you have questions, please visit the Frequently Asked Questions section of the [employer] website at [email or web address] Remember, [employer] is NOT to be used for urgent needs. For medical emergencies, dial 911. Follow-up and Dispositions    · Return in about 3 months (around 8/16/2019), or if symptoms worsen or fail to improve. I have reviewed with the patient details of the assessment and plan and all questions were answered. Relevent patient education was performed. The most recent lab findings were reviewed with the patient. An After Visit Summary was printed and given to the patient. 1. Have you been to the ER, urgent care clinic since your last visit? Hospitalized since your last visit? No    2. Have you seen or consulted any other health care providers outside of the [address] [place] [address] since your last visit? Include any pap smears or colon screening. No    Pt would like to f/u on blood work from last visit.

## 2019-05-16 NOTE — PATIENT INSTRUCTIONS
[employer] Activation    Thank you for requesting access to [employer]. Please follow the instructions below to securely access and download your online medical record. [employer] allows you to send messages to your doctor, view your test results, renew your prescriptions, schedule appointments, and more. How Do I Sign Up? 1. In your internet browser, go to [email or web address]  2. Click on the First Time User? Click Here link in the Sign In box. You will be redirect to the New Member Sign Up page. 3. Enter your [employer] Access Code exactly as it appears below. You will not need to use this code after youve completed the sign-up process. If you do not sign up before the expiration date, you must request a new code. [employer] Access Code: [email or web address]  Expires: 2019 12:08 PM (This is the date your [employer] access code will )    4. Enter the last four digits of your Social Security Number (xxxx) and Date of Birth (mm/dd/yyyy) as indicated and click Submit. You will be taken to the next sign-up page. 5. Create a [employer] ID. This will be your [employer] login ID and cannot be changed, so think of one that is secure and easy to remember. 6. Create a [employer] password. You can change your password at any time. 7. Enter your Password Reset Question and Answer. This can be used at a later time if you forget your password. 8. Enter your e-mail address. You will receive e-mail notification when new information is available in [address] 9. Click Sign Up. You can now view and download portions of your medical record. 10. Click the Download Summary menu link to download a portable copy of your medical information. Additional Information    If you have questions, please visit the Frequently Asked Questions section of the [employer] website at [email or web address] Remember, [employer] is NOT to be used for urgent needs. For medical emergencies, dial 911.

## 2019-05-16 NOTE — LETTER
NOTIFICATION RETURN TO WORK / SCHOOL 
 
5/16/2019 12:26 PM 
 
Mr. Khushboo Ibarra [place] [employer] [place] [id number] To Whom It May Concern: 
 
Khushboo Ibarra is currently under the care of [address] [place] [address]. He will return to work on: 05/20/19 If there are questions or concerns please have the patient contact our office. Sincerely, Wing Brooklynn MD

## 2019-05-16 NOTE — PROGRESS NOTES
1. Have you been to the ER, urgent care clinic since your last visit? Hospitalized since your last visit? Yes, [facility]    2. Have you seen or consulted any other health care providers outside of the [address] [place] [address] since your last visit? Include any pap smears or colon screening.  no    3 most recent PHQ Screens 5/16/2019   PHQ Not Done -   Little interest or pleasure in doing things Not at all   Feeling down, depressed, irritable, or hopeless Not at all   Total Score PHQ 2 0     Chief Complaint   Patient presents with   [address]

## 2019-05-20 ENCOUNTER — TELEPHONE (OUTPATIENT)
Dept: CARDIOLOGY CLINIC | Age: 64
End: 2019-05-20

## 2019-05-20 ENCOUNTER — PATIENT OUTREACH (OUTPATIENT)
Dept: INTERNAL MEDICINE CLINIC | Age: 64
End: 2019-05-20

## 2019-05-20 NOTE — TELEPHONE ENCOUNTER
I returned call to patient-gave him number to Dr. Stefania Pantoja office to follow up on CT of chest. He will call now.

## 2019-05-20 NOTE — PROGRESS NOTES
This writer reaches out to Mr. Alyssia Martinez as a follow up to our last conversation. Pt's name iwona and  verified as pt identifiers. Pt tells this writer he is well. He denies any wheezing, shortness of breath and feels the pneumonia is resolved. Weight= consistently 79-80 kg  2nd hand smoke= better controlled, wife will not attend [facility] Clinic apt w/ pt as she has to work  Concern= pt continues to question calls to him to schedule Chest CT's. This writer reports to pt who's ordered his last two CT's and per 3/26 he should repeat in 3 months. Post our conversation, Elidia Rivas will reach out to [facility] provider and explain his concern for freq testing and how it's resulting in bills he can't pay thus decreasing his credit score. Pt will also suggest that office reach out to his HD SW for he says his case is about to be closed (transplant) and he feels the CT has something to do with this- reason is unclear to this writer. Pt has no other concerns at this time. This writer will continue to follow pt's progress.

## 2019-05-22 NOTE — TELEPHONE ENCOUNTER
3rd attempt to reach patient re: Sleep Medicine Referral.  Sent letter to contact [facility].

## 2019-05-29 ENCOUNTER — OFFICE VISIT (OUTPATIENT)
Dept: CARDIOLOGY CLINIC | Age: 64
End: 2019-05-29

## 2019-05-29 VITALS
OXYGEN SATURATION: 90 % | BODY MASS INDEX: 27.59 KG/M2 | WEIGHT: 175.8 LBS | TEMPERATURE: 99.1 F | RESPIRATION RATE: 18 BRPM | DIASTOLIC BLOOD PRESSURE: 76 MMHG | HEIGHT: 67 IN | SYSTOLIC BLOOD PRESSURE: 110 MMHG | HEART RATE: 52 BPM

## 2019-05-29 DIAGNOSIS — I50.32 DIASTOLIC CHF, CHRONIC (HCC): Primary | ICD-10-CM

## 2019-05-29 RX ORDER — MIDODRINE HYDROCHLORIDE 5 MG/1
TABLET ORAL 3 TIMES DAILY
COMMUNITY

## 2019-05-29 NOTE — PROGRESS NOTES
[facility] in [place], [address] [place] [address] Note    Patient name: Merissa Valadez  Patient : 1955  Patient MRN: [id number]  Date of service: 19    Primary care physician: Tasia Mackenzie MD  Primary cardiologist: Dr. Aniket Orellana:  Chronic diastolic heart failure and RA hypoxia    PLAN:  Continue midodrine for palliative support to enhance tolerance of dialysis  Cannot tolerate heart failure medications due to hypotension with dialysis  Volume management per dialysis; patient euvolemic by RHC  Reinforced low salt diet and fluid restriction to 6 x 8oz glasses per day  Not surgical candidate for renal transplant due to severe lung disease/chronic infection c/b room air hypoxia c/b secondary severe pulmonary hypertension c/b TR and at risk of RV failure  Needs repeat chest CT and follow-up with pulmonologist; likely needs chronic O2   Follow-up with primary cardiologist  Follow-up with PCP for flu vaccine annually and pneumonia vaccine every 5 years  Return to [facility] Clinic in 3 months     PLAN OF CARE:  Patient has HF with preserved EF; he is euvolemic and heart failure is compensated. The cause of severe pulmonary hypertension is multifactorial, but predominantly related to high output heart failure AND significant room air hypoxia due to interstitial lung disease vs. chronic infection leading to high PVR. CT chest/angiogram for pulmonary AVM as our last resort due to allergy to contrast. Hemodialysis precludes use of MRI with contrast. He has a significant lung disease, 6MW less than 72 feet equivalent of NYHA class IV symptoms due to hypoxia and PAH. Will work on referral to pulmonologist ASAP.     IMPRESSION:  Dyspnea at rest  Wheezing at rest  Cough productive yellow sputum  Pneumonia by chest CT  Chronic diastolic heart failure  Heart failure with preserved ejection fraction  Mild LV dysfunction, LVEF 45-50%  High cardiac output likely due to infection  ESRD on HD with normal filling pressures  Severe pulmonary hypertension likely due to hypoxia  Severe tricuspid regurgitation  Non-restorative sleep with confirmed HANDY  High ferritin level likely due to dialysis  Severe pulmonary restriction, low DLCO     CARDIAC IMAGING:  Echo (9/6/18) LVEF 40-45%, LVEDD 5.1cm, IVS 1.3cm, , RV size and function normal, RV pressure 91mmHg, severe TR, IVC dilated with partial collapse with inspiratio.   EKG (4/6/18) NSR, LVH  LHC (4/6/18)  normal coronary arteries, LV EF 30-35%, moderately elevated LV filling pressure  NST (2/21/18) abnormal with an inferior wall defect with a moderate amount of reperfusion, LVEF 38%  RHC (1/29/19)  RV Pressures 67 mmHg    2 mmHg      873 mmHg/sec          PA Pressures 75 mmHg    27 mmHg    43 mmHg            RA Pressures     4 mmHg      7 mmHg    5 mmHg      PCW Pressures     10 mmHg      13 mmHg    12 mmHg      Phase: Baseline      Data HR TDCO TDCI Chad CI PVR/SVR TPR/TVR   Cardiac Output Results 100 bpm    9.17 L/min    4.17 L/min/m2    3.69 L/min/m2          Blood Oximetry 1/29/19 1558--1/29/19 1641 before discharge      PA O2 Sat   71.2 %      6MW (3/12/19) desaturated to 86%     OTHER IMAGING:  PFT (11/20/18) Severe restrictive ventilatory defect; low DLCO  Chest CT (3/26/19) . Multiple scattered bilateral subcentimeter lung nodules that are predominantly groundglass or part solid are nonspecific. Differential considerations include but are not limited to nonspecific infection or inflammation. Follow-up routine chest CT without contrast is recommended in 3 months. 2. No evidence for interstitial lung disease or pulmonary fibrosis. 3. Cardiomegaly. Mild dilatation of the ascending aorta measuring 3.5 cm in diameter. Dilatation of the main pulmonary artery suggesting pulmonary artery hypertension. 4. A mildly enlarged left axillary lymph node is nonspecific.  Attention on  follow-up is suggested.     HISTORY OF PRESENT ILLNESS:  I had the pleasure of seeing Eugene Ortega in [facility] at [facility] in [address] a 70 y. o. male with h/o HFpEF, HTN, ESRD on HD. Patient was initially referred to Dr. Ingrid Mejia for exertional chest pain while exercising at the gym. The patient himself was interested in cardiac clearance for renal transplantation given reduced HFpEF and severe pulmonary hypertension .       Patient had a myocardial perfusion imaging study on 02/21/2018, which was abnormal with an inferior wall defect with a moderate amount of reperfusion with an ejection fraction of 38%. He previously had coronary angiography in 2014 and had nonocclusive disease at that time. [facility] was repeated in April 2018 and showed normal coronaries, LVEF 35% and elevated filling pressures at weight 169lbs. He was started on medical therapy for heart failure and echocardiogram was repeated in September 2018. It showed mild LV dysfunction LVEF 40-45%, severe pulmonary hypertension RVSP 91mmHg and severe TR with normal RV size and function. Right heart catherization revealed that he has heart failure with preserved ejection fraction, severe pulmonary hypertension and high cardiac output, but that he was euvolemic with dialysis and therefore was compensated heart failure. Pulmonary hypertension was severe and patient at risk of RV failure, already showing significant TR. Further evaluation showed that his pulmonary hypertension was more likely related to lung than heart disease. He was found to have severe exertional hypoxia and we prescribed him home O2. PFT showed severe restriction and low DLCO. Chest CT showed disseminated infection which might explain his high cardiac output (probably amplified by presence of AV fistula for dialysis).  He was seen by pulmonary who started levoquin and zithromax with a plan to follow-up on chest CT early May, but it does not appear he knew to take antibiotics. Admitted 5/1-5/4/19 for CAP and treated with levaquin. Remains significantly symptomatic, drops SaO2 to 90% walking 72 feet. NYHA equivalent to class IV due to lung disease.      INTERVAL HISTORY:  Today, patient presents for routine clinic visit.     Patient is doing poorly.  He refused to walk more than a hallway due to severe dyspnea. He is wheezing in clinic. He improved a little bit with the IV antibiotics. He needs repeat chest CT. He was taken off O2.     Patient denies symptoms of volume overload, abdominal bloating or leg edema.      Patient denies orthopnea, PND or nocturia. Patient denies irregular heart rate or palpitations. Patient denies other cardiac symptoms such as chest pain or leg pain with walking. Patient is compliant with fluid restriction and taking medications as prescribed. Patient manages his own medications. REVIEW OF SYSTEMS:  General: Denies fever, night sweats. Ear, nose and throat: Denies difficulty hearing, sinus problems, runny nose, post-nasal drip, ringing in ears, mouth sores, loose teeth, ear pain, nosebleeds, sore throate, facial pain or numbess  Cardiovascular: see above in the interval history  Respiratory: Denies cough, wheezing, sputum production, hemoptysis.   Gastrointestinal: Denies heartburn, constipation, intolerance to certain foods, diarrhea, abdominal pain, nausea, vomiting, difficulty swallowing, blood in stool  Kidney and bladder: Denies painful urination, frequent urination, urgency, prostate problems and impotence  Musculoskeletal: Denies joint pain, muscle weakness  Skin and hair: Denies change in existing skin lesions, hair loss or increase, breast changes    PHYSICAL EXAM:  Vital signs:   Visit Vitals  /76 (BP 1 Location: Right arm, BP Patient Position: Sitting)   Pulse (!) 52   Temp 99.1 °F (37.3 °C) (Oral)   Resp 18   Ht 5' 7\" (1.702 m)   Wt 175 lb 12.8 oz (79.7 kg)   SpO2 90%   BMI 27.53 kg/m²     General: Patient is well developed, well-nourished in no acute distress  HEENT: Normocephalic and atraumatic. No scleral icterus. Pupils are equal, round and reactive to light and accomodation. No conjunctival injection. Oropharynx is clear. Neck: Supple. No evidence of thyroid enlargements or lymphadenopathy. JVD: Cannot be appreciated   Lungs: Breath sounds are equal and clear bilaterally. No wheezes, rhonchi, or rales. Heart: Regular rate and rhythm with normal S1 and S2. No murmurs, gallops or rubs. Abdomen: Soft, no mass or tenderness. No organomegaly or hernia. Bowel sounds present. Genitourinary and rectal: deferred  Extremities: No cyanosis, clubbing, or edema. Neurologic: No focal sensory or motor deficits are noted. Grossly intact. Psychiatric: Awake, alert an doriented x 3. Appropriate mood and affect. Skin: Warm, dry and well perfused. No lesions, nodules or rashes are noted.     PAST MEDICAL HISTORY:  Past Medical History:   Diagnosis Date    Adverse effect of anesthesia     \"MY BLOOD PRESSURE DROPS ,\"    Alzheimer's disease 2/18/2011    PT DENIES    Chronic kidney disease     KIDNEYS FAILED R/T HTN    Chronic pain     LEFT HIP PAIN    Heart failure ([facility] 6/2015    CHF    Hemodialysis patient ([facility]     M-W-F    Osteoarthrosis, unspecified whether generalized or localized, unspecified site 2/18/2011    Other unknown and unspecified cause of morbidity or mortality     PT DOSES OFF FREQUENTLY    PUD (peptic ulcer disease)     Renal Disease 2/18/2011    Right inguinal hernia 3/1/2016    Unspecified asthma(493.90) 2/18/2011    Unspecified essential hypertension 2/18/2011    Ventral incisional hernia 3/1/2016       PAST SURGICAL HISTORY:  Past Surgical History:   Procedure Laterality Date    CARDIAC SURG PROCEDURE UNLIST  4/29/15    CARDIAC CATHGERIZATION    HX GI      COLONOSCOPY    HX HEENT      TONSILLECTOMY    HX HERNIA REPAIR  3/8/16    Repair of right inguinal hernia with mesh,Repair of ventral incisional hernia with mesh    HX ORTHOPAEDIC Right 10/13/15     THR    HX ORTHOPAEDIC      left hip    HX OTHER SURGICAL  10/11/2016    revision of AV fistula left arm with repair of pseudoaneurysm    HX SMALL BOWEL RESECTION      HX UROLOGICAL Bilateral 2005    REMOVAL OF KIDNEYS    HX VASCULAR ACCESS      LT ARM WITH PORTS FOR DYALISIS       FAMILY HISTORY:  Family History   Problem Relation Age of Onset    Cancer Mother         BREAST    Cancer Sister         THROAT    Stroke Brother     Anesth Problems Neg Hx        SOCIAL HISTORY:  Social History     Socioeconomic History    Marital status:      Spouse name: Not on file    Number of children: Not on file    Years of education: Not on file    Highest education level: Not on file   Tobacco Use    Smoking status: Never Smoker    Smokeless tobacco: Never Used   Substance and Sexual Activity    Alcohol use: No    Drug use: No       LABORATORY RESULTS:     Labs Latest Ref Rng & Units 5/3/2019 5/2/2019   WBC 4.1 - 11.1 K/uL 8.1 6.6   RBC 4.10 - 5.70 M/uL 3.11(L) 3.04(L)   Hemoglobin 12.1 - 17.0 g/dL 10. 0(L) 9.9(L)   Hematocrit 36.6 - 50.3 % 32. 6(L) 32. 6(L)   MCV 80.0 - 99.0 . 8(H) 107. 2(H)   Platelets 118 - 334 K/uL 297 236   Lymphocytes 12 - 49 % 24 20   Monocytes 5 - 13 % 10 10   Eosinophils 0 - 7 % 8(H) 7   Basophils 0 - 1 % 1 1   Albumin 3.5 - 5.0 g/dL - 3. 3(L)   Calcium 8.5 - 10.1 MG/DL 9.9 9.6   Glucose 65 - 100 mg/dL 99 89   BUN 6 - 20 MG/DL 58(H) 49(H)   Creatinine 0.70 - 1.30 MG/DL 8.99(H) 8.14(H)   Sodium 136 - 145 mmol/L 136 138   Potassium 3.5 - 5.1 mmol/L 3.2(L) 3. 3(L)   Some recent data might be hidden     Lab Results   Component Value Date/Time    TSH 2.190 10/24/2018 11:26 AM    TSH 3.23 11/04/2015 11:29 AM       CURRENT MEDICATIONS:    Current Outpatient Medications:     midodrine (PROAMITINE) 5 mg tablet, Take  by mouth three (3) times daily. , Disp: , Rfl:     SYMBICORT 160-4.5 mcg/actuation HFAA, Take 2 Puffs by inhalation two (2) times a day., Disp: 1 Inhaler, Rfl: 12    guaiFENesin ER (MUCINEX) 600 mg ER tablet, Take 600 mg by mouth every twelve (12) hours. , Disp: , Rfl:     bisacodyl (DULCOLAX, BISACODYL,) 5 mg EC tablet, Take 5 mg by mouth daily as needed for Constipation. , Disp: , Rfl:     cyclobenzaprine (FLEXERIL) 10 mg tablet, TAKE ONE TABLET BY MOUTH THREE TIMES DAILY AS NEEDED FOR MUSCLE SPASM, Disp: 90 Tab, Rfl: 0    calcium acetate (PHOSLO) 667 mg cap, Take 3 Caps by mouth three (3) times daily (with meals). Patient claims to take 1cap TID, Disp: , Rfl:     aspirin delayed-release 81 mg tablet, Take 81 mg by mouth daily. , Disp: , Rfl:     acetaminophen (TYLENOL) 325 mg tablet, Take 2 Tabs by mouth every six (6) hours. Indications: PAIN, Disp: 60 Tab, Rfl: 0    fluticasone propion-salmeterol (ADVAIR DISKUS) 250-50 mcg/dose diskus inhaler, Take 1 Puff by inhalation every twelve (12) hours. Not taking because of cost; never filled prescription, Disp: , Rfl:     carvedilol (COREG) 6.25 mg tablet, Take 12.5 mg by mouth every other day., Disp: 60 Tab, Rfl: 12    sildenafil citrate (VIAGRA) 100 mg tablet, Take 1 Tab by mouth as needed. , Disp: 8 Tab, Rfl: 12    losartan (COZAAR) 25 mg tablet, Take  by mouth every other day., Disp: , Rfl:     albuterol (PROVENTIL HFA, VENTOLIN HFA, PROAIR HFA) 90 mcg/actuation inhaler, Take 2 Puffs by inhalation every four (4) hours as needed for Wheezing., Disp: 1 Inhaler, Rfl: 12      Thank you for your referral and allowing me to participate in this patient's care.     Namrata Iraheta MD PhD  [address] [place] [address], [address]  Phone: [phone]  Fax: [phone]

## 2019-05-29 NOTE — PROGRESS NOTES
Yimi Jesus is a 70 y.o. male    Chief Complaint   Patient presents with    Follow-up       1. Have you been to the ER, urgent care clinic since your last visit? Hospitalized since your last visit? Yes at last visit. Hospitalized for 3 days for not being able to breathe. 2. Have you seen or consulted any other health care providers outside of the [address] [place] [address] since your last visit? Include any pap smears or colon screening.  No

## 2019-05-30 ENCOUNTER — TELEPHONE (OUTPATIENT)
Dept: SLEEP MEDICINE | Age: 64
End: 2019-05-30

## 2019-05-30 NOTE — TELEPHONE ENCOUNTER
Patient called to schedule 1st adherence appointment after receiving letter in the mail. I asked the patient if he has received his machine from [facility] and he stated he has not. Called Santo at [facility], he stated that when [facility] attempted to contact the patient for PAP set up, the phone number on file was not correct. Provided Santo with the correct telephone number, scheduled 1st adherence appointment, and kindly asked Santo to contact the patient today.

## 2019-06-04 ENCOUNTER — PATIENT OUTREACH (OUTPATIENT)
Dept: INTERNAL MEDICINE CLINIC | Age: 64
End: 2019-06-04

## 2019-06-04 NOTE — PROGRESS NOTES
Patient has graduated from the Transitions of Care Coordination  program on 6/4. Patient's symptoms are stable at this time. Patient/family has the ability to self-manage. Pt reports will be fitted for CPAP on 6/6  He saw Pulmonology on 6/3 who has given him prescriptions for cheaper inhalers. Per pt he will have additional testing soon in addition to repeat chest CT. CP is denies +. Intermittent wheezing and sob. Home weight is down to 76kg/176 lbs, d/t decreased appetite. HF Zone = Clydia Ashu  Wife is trying to do better about smoking around pt- closes doors to decrease his exposure. Care management goals have been completed at this time. No further nurse navigator follow up scheduled. Goals Addressed     None          Pt has nurse navigator's contact information for any further questions, concerns, or needs. Patients upcoming visits:    Pt says will call back to PCP to schedule next f/u.    Future Appointments   Date Time Provider Gerardo Clancy   8/15/2019  2:00 PM Shaun Jarquin NP [facility]

## 2019-07-10 ENCOUNTER — DOCUMENTATION ONLY (OUTPATIENT)
Dept: SLEEP MEDICINE | Age: 64
End: 2019-07-10

## 2019-07-16 ENCOUNTER — OFFICE VISIT (OUTPATIENT)
Dept: INTERNAL MEDICINE CLINIC | Age: 64
End: 2019-07-16

## 2019-07-16 VITALS
DIASTOLIC BLOOD PRESSURE: 84 MMHG | HEIGHT: 67 IN | RESPIRATION RATE: 16 BRPM | WEIGHT: 178 LBS | OXYGEN SATURATION: 99 % | SYSTOLIC BLOOD PRESSURE: 116 MMHG | BODY MASS INDEX: 27.94 KG/M2 | HEART RATE: 100 BPM | TEMPERATURE: 98.1 F

## 2019-07-16 DIAGNOSIS — E78.00 HYPERCHOLESTEREMIA: ICD-10-CM

## 2019-07-16 DIAGNOSIS — Z99.2 STAGE 5 CHRONIC KIDNEY DISEASE ON CHRONIC DIALYSIS (HCC): ICD-10-CM

## 2019-07-16 DIAGNOSIS — I10 ESSENTIAL HYPERTENSION: ICD-10-CM

## 2019-07-16 DIAGNOSIS — G45.9 TIA (TRANSIENT ISCHEMIC ATTACK): Primary | ICD-10-CM

## 2019-07-16 DIAGNOSIS — N18.6 STAGE 5 CHRONIC KIDNEY DISEASE ON CHRONIC DIALYSIS (HCC): ICD-10-CM

## 2019-07-16 NOTE — PROGRESS NOTES
Chief Complaint   Patient presents with   [employer] Referral Request     neurologist      Christell Alpers is a 70 y.o. male and presents with Referral Request (neurologist)    Subjective:    He had a bout of slurred speech reported. he denies any bouts of weakness    Hypertension Review:  The patient has essential hypertension  Diet and Lifestyle: generally follows a  low sodium diet, exercises sporadically  Home BP Monitoring: is not measured at home. Pertinent ROS: taking medications as instructed, no medication side effects noted, no TIA's, no chest pain on exertion, no dyspnea on exertion, no swelling of ankles. Chronic Renal Disease Review:  HPI: Christell Alpers, ([id number]) is a 70 y.o. male who returns for follow up of   chronic renal disease caused by unknown etiology. te patient  is followed by renal  The patient has experienced the following symptoms: no problems   The patient has not experienced any of the following symptoms: no problems   Prescribed diet is JORDY   The following actions have been prescribed for slowing the   progression of CRF: cardiovascular risk factor reduction including male gender, hypertension   The patientis  on dialysis,his last session was yesterday. COPD REVIEW:  The patient is being seen for follow up of COPD,the patient has been stable  Oxygen: He currently is not on home oxygen therapy. Symptoms: chronic dyspnea: severity = not present: course of sx: stable. Patient uses 2 pillows at night. Patient does continue to smoke cigarettes. Review of Systems  Constitutional: negative for fevers, chills, anorexia and weight loss  Eyes:   negative for visual disturbance and irritation  ENT:   negative for tinnitus,sore throat,nasal congestion,ear pains. hoarseness  Respiratory:  negative for cough, hemoptysis, dyspnea,wheezing  CV:   negative for chest pain, palpitations, lower extremity edema  GI:   negative for nausea, vomiting, diarrhea, abdominal pain,melena  Endo: negative for polyuria,polydipsia,polyphagia,heat intolerance  Genitourinary: negative for frequency, dysuria and hematuria  Integument:  negative for rash and pruritus  Hematologic:  negative for easy bruising and gum/nose bleeding  Musculoskel: negative for myalgias, arthralgias, back pain, muscle weakness, joint pain  Neurological:  negative for headaches, dizziness, vertigo, memory problems and gait   Behavl/Psych: negative for feelings of anxiety, depression, mood changes    Past Medical History:   Diagnosis Date    Adverse effect of anesthesia     \"MY BLOOD PRESSURE DROPS ,\"    Alzheimer's disease 2/18/2011    PT DENIES    Chronic kidney disease     KIDNEYS FAILED R/T HTN    Chronic pain     LEFT HIP PAIN    Heart failure ([facility] [address] 6/2015    CHF    Hemodialysis patient ([facility] [address]     M-W-F    Osteoarthrosis, unspecified whether generalized or localized, unspecified site 2/18/2011    Other unknown and unspecified cause of morbidity or mortality     PT DOSES OFF FREQUENTLY    PUD (peptic ulcer disease)     Renal Disease 2/18/2011    Right inguinal hernia 3/1/2016    Unspecified asthma(493.90) 2/18/2011    Unspecified essential hypertension 2/18/2011    Ventral incisional hernia 3/1/2016     Past Surgical History:   Procedure Laterality Date    CARDIAC SURG PROCEDURE UNLIST  4/29/15    CARDIAC CATHGERIZATION    HX GI      COLONOSCOPY    HX HEENT      TONSILLECTOMY    HX HERNIA REPAIR  3/8/16    Repair of right inguinal hernia with mesh,Repair of ventral incisional hernia with mesh    HX ORTHOPAEDIC Right 10/13/15     THR    HX ORTHOPAEDIC      left hip    HX OTHER SURGICAL  10/11/2016    revision of AV fistula left arm with repair of pseudoaneurysm    HX SMALL BOWEL RESECTION      HX UROLOGICAL Bilateral 2005    REMOVAL OF KIDNEYS    HX VASCULAR ACCESS      LT ARM WITH PORTS FOR DYALISIS     Social History     Socioeconomic History    Marital status:      Spouse name: Not on file    Number of children: Not on file    Years of education: Not on file    Highest education level: Not on file   Tobacco Use    Smoking status: Never Smoker    Smokeless tobacco: Never Used   Substance and Sexual Activity    Alcohol use: No    Drug use: No     Family History   Problem Relation Age of Onset    Cancer Mother         BREAST    Cancer Sister         THROAT    Stroke Brother     Anesth Problems Neg Hx      Current Outpatient Medications   Medication Sig Dispense Refill    midodrine (PROAMITINE) 5 mg tablet Take  by mouth three (3) times daily.  SYMBICORT 160-4.5 mcg/actuation HFAA Take 2 Puffs by inhalation two (2) times a day. 1 Inhaler 12    guaiFENesin ER (MUCINEX) 600 mg ER tablet Take 600 mg by mouth every twelve (12) hours.  fluticasone propion-salmeterol (ADVAIR DISKUS) 250-50 mcg/dose diskus inhaler Take 1 Puff by inhalation every twelve (12) hours. Not taking because of cost; never filled prescription      bisacodyl (DULCOLAX, BISACODYL,) 5 mg EC tablet Take 5 mg by mouth daily as needed for Constipation.  cyclobenzaprine (FLEXERIL) 10 mg tablet TAKE ONE TABLET BY MOUTH THREE TIMES DAILY AS NEEDED FOR MUSCLE SPASM 90 Tab 0    carvedilol (COREG) 6.25 mg tablet Take 12.5 mg by mouth every other day. 60 Tab 12    sildenafil citrate (VIAGRA) 100 mg tablet Take 1 Tab by mouth as needed. 8 Tab 12    losartan (COZAAR) 25 mg tablet Take  by mouth every other day.  calcium acetate (PHOSLO) 667 mg cap Take 3 Caps by mouth three (3) times daily (with meals). Patient claims to take 1cap TID      aspirin delayed-release 81 mg tablet Take 81 mg by mouth daily.  acetaminophen (TYLENOL) 325 mg tablet Take 2 Tabs by mouth every six (6) hours. Indications: PAIN 60 Tab 0    albuterol (PROVENTIL HFA, VENTOLIN HFA, PROAIR HFA) 90 mcg/actuation inhaler Take 2 Puffs by inhalation every four (4) hours as needed for Wheezing.  1 Inhaler 12     Allergies   Allergen Reactions    Milk Anaphylaxis    Shellfish Derived Anaphylaxis    Benadryl [Diphenhydramine Hcl] Nausea and Vomiting    Zofran Odt [Ondansetron] Nausea and Vomiting    Iodinated Contrast- Oral And Iv Dye Other (comments)    Peanut Shortness of Breath    Penicillins Unknown (comments)       Objective:  Visit Vitals  /84   Pulse 100   Temp 98.1 °F (36.7 °C) (Oral)   Resp 16   Ht 5' 7\" (1.702 m)   Wt 178 lb (80.7 kg)   SpO2 99%   BMI 27.88 kg/m²     Physical Exam:   General appearance - alert, well appearing, and in no distress  Mental status - alert, oriented to person, place, and time  EYE-ILIANA, EOMI, corneas normal, no foreign bodies  ENT-ENT exam normal, no neck nodes or sinus tenderness  Nose - normal and patent, no erythema, discharge or polyps  Mouth - mucous membranes moist, pharynx normal without lesions  Neck - supple, no significant adenopathy   Chest - clear to auscultation, no wheezes, rales or rhonchi, symmetric air entry   Heart - normal rate, regular rhythm, normal S1, S2, no murmurs, rubs, clicks or gallops   Abdomen - soft, nontender, nondistended, no masses or organomegaly  Lymph- no adenopathy palpable  Ext-peripheral pulses normal, no pedal edema, no clubbing or cyanosis  Skin-Warm and dry.  no hyperpigmentation, vitiligo, or suspicious lesions  Neuro -alert, oriented, normal speech, no focal findings or movement disorder noted  Neck-normal C-spine, no tenderness, full ROM without pain  Feet-no nail deformities or callus formation with good pulses noted      Results for orders placed or performed during the hospital encounter of 05/01/19   CULTURE, RESPIRATORY/SPUTUM/BRONCH W GRAM STAIN   Result Value Ref Range    Special Requests: NO SPECIAL REQUESTS      GRAM STAIN FEW WBCS SEEN      GRAM STAIN OCCASIONAL GRAM POSITIVE COCCI      Culture result: MODERATE NORMAL RESPIRATORY DEVON     CBC WITH AUTOMATED DIFF   Result Value Ref Range    WBC 6.6 4.1 - 11.1 K/uL    RBC 3.04 (L) 4.10 - 5.70 M/uL    HGB 9.9 (L) 12.1 - 17.0 g/dL    HCT 32.6 (L) 36.6 - 50.3 %    .2 (H) 80.0 - 99.0 FL    MCH 32.6 26.0 - 34.0 PG    MCHC 30.4 30.0 - 36.5 g/dL    RDW 12.4 11.5 - 14.5 %    PLATELET 443 093 - 059 K/uL    MPV 10.3 8.9 - 12.9 FL    NRBC 0.0 0  WBC    ABSOLUTE NRBC 0.00 0.00 - 0.01 K/uL    NEUTROPHILS 62 32 - 75 %    LYMPHOCYTES 20 12 - 49 %    MONOCYTES 10 5 - 13 %    EOSINOPHILS 7 0 - 7 %    BASOPHILS 1 0 - 1 %    IMMATURE GRANULOCYTES 0 0.0 - 0.5 %    ABS. NEUTROPHILS 4.1 1.8 - 8.0 K/UL    ABS. LYMPHOCYTES 1.3 0.8 - 3.5 K/UL    ABS. MONOCYTES 0.7 0.0 - 1.0 K/UL    ABS. EOSINOPHILS 0.5 (H) 0.0 - 0.4 K/UL    ABS. BASOPHILS 0.0 0.0 - 0.1 K/UL    ABS. IMM. GRANS. 0.0 0.00 - 0.04 K/UL    DF AUTOMATED     RENAL FUNCTION PANEL   Result Value Ref Range    Sodium 138 136 - 145 mmol/L    Potassium 3.3 (L) 3.5 - 5.1 mmol/L    Chloride 98 97 - 108 mmol/L    CO2 33 (H) 21 - 32 mmol/L    Anion gap 7 5 - 15 mmol/L    Glucose 89 65 - 100 mg/dL    BUN 49 (H) 6 - 20 MG/DL    Creatinine 8.14 (H) 0.70 - 1.30 MG/DL    BUN/Creatinine ratio 6 (L) 12 - 20      GFR est AA 8 (L) >60 ml/min/1.73m2    GFR est non-AA 7 (L) >60 ml/min/1.73m2    Calcium 9.6 8.5 - 10.1 MG/DL    Phosphorus 4.6 2.6 - 4.7 MG/DL    Albumin 3.3 (L) 3.5 - 5.0 g/dL   CBC WITH AUTOMATED DIFF   Result Value Ref Range    WBC 8.1 4.1 - 11.1 K/uL    RBC 3.11 (L) 4.10 - 5.70 M/uL    HGB 10.0 (L) 12.1 - 17.0 g/dL    HCT 32.6 (L) 36.6 - 50.3 %    .8 (H) 80.0 - 99.0 FL    MCH 32.2 26.0 - 34.0 PG    MCHC 30.7 30.0 - 36.5 g/dL    RDW 12.7 11.5 - 14.5 %    PLATELET 164 710 - 644 K/uL    MPV 10.6 8.9 - 12.9 FL    NRBC 0.0 0  WBC    ABSOLUTE NRBC 0.00 0.00 - 0.01 K/uL    NEUTROPHILS 56 32 - 75 %    LYMPHOCYTES 24 12 - 49 %    MONOCYTES 10 5 - 13 %    EOSINOPHILS 8 (H) 0 - 7 %    BASOPHILS 1 0 - 1 %    IMMATURE GRANULOCYTES 1 (H) 0.0 - 0.5 %    ABS. NEUTROPHILS 4.6 1.8 - 8.0 K/UL    ABS. LYMPHOCYTES 1.9 0.8 - 3.5 K/UL    ABS. MONOCYTES 0.8 0.0 - 1.0 K/UL    ABS. EOSINOPHILS 0.6 (H) 0.0 - 0.4 K/UL    ABS. BASOPHILS 0.1 0.0 - 0.1 K/UL    ABS. IMM. GRANS. 0.1 (H) 0.00 - 0.04 K/UL    DF AUTOMATED     METABOLIC PANEL, BASIC   Result Value Ref Range    Sodium 136 136 - 145 mmol/L    Potassium 3.2 (L) 3.5 - 5.1 mmol/L    Chloride 97 97 - 108 mmol/L    CO2 27 21 - 32 mmol/L    Anion gap 12 5 - 15 mmol/L    Glucose 99 65 - 100 mg/dL    BUN 58 (H) 6 - 20 MG/DL    Creatinine 8.99 (H) 0.70 - 1.30 MG/DL    BUN/Creatinine ratio 6 (L) 12 - 20      GFR est AA 7 (L) >60 ml/min/1.73m2    GFR est non-AA 6 (L) >60 ml/min/1.73m2    Calcium 9.9 8.5 - 10.1 MG/DL   C REACTIVE PROTEIN, QT   Result Value Ref Range    C-Reactive protein 1.51 (H) 0.00 - 0.60 mg/dL       Assessment/Plan:    ICD-10-CM ICD-9-CM    1. TIA (transient ischemic attack) G45.9 435.9 CT HEAD WO CONT   2. Essential hypertension I10 401.9    3. Hypercholesteremia E78.00 272.0    4. Stage 5 chronic kidney disease on chronic dialysis (HCC) N18.6 585.6     Z99.2 V45.11      Orders Placed This Encounter    CT HEAD WO CONT     Standing Status:   Future     Standing Expiration Date:   8/16/2020     Order Specific Question:   Is Patient Allergic to Contrast Dye? Answer:   No     lose weight, follow low fat diet, follow low salt diet, continue present plan,Take 81mg aspirin daily  Patient Instructions   [employer] Activation    Thank you for requesting access to [employer]. Please follow the instructions below to securely access and download your online medical record. [employer] allows you to send messages to your doctor, view your test results, renew your prescriptions, schedule appointments, and more. How Do I Sign Up? 1. In your internet browser, go to [email or web address]  2. Click on the First Time User? Click Here link in the Sign In box. You will be redirect to the New Member Sign Up page. 3. Enter your MyChart Access Code exactly as it appears below.  You will not need to use this code after youve completed the sign-up process. If you do not sign up before the expiration date, you must request a new code. [employer] Access Code: [email or web address]  Expires: 2019  1:41 PM (This is the date your [employer] access code will )    4. Enter the last four digits of your Social Security Number (xxxx) and Date of Birth (mm/dd/yyyy) as indicated and click Submit. You will be taken to the next sign-up page. 5. Create a [employer] ID. This will be your [employer] login ID and cannot be changed, so think of one that is secure and easy to remember. 6. Create a [employer] password. You can change your password at any time. 7. Enter your Password Reset Question and Answer. This can be used at a later time if you forget your password. 8. Enter your e-mail address. You will receive e-mail notification when new information is available in [address] 9. Click Sign Up. You can now view and download portions of your medical record. 10. Click the Download Summary menu link to download a portable copy of your medical information. Additional Information    If you have questions, please visit the Frequently Asked Questions section of the [employer] website at [email or web address] Remember, [employer] is NOT to be used for urgent needs. For medical emergencies, dial 911. Follow-up and Dispositions    · Return in about 1 month (around 2019). I have reviewed with the patient details of the assessment and plan and all questions were answered. Relevent patient education was performed. The most recent lab findings were reviewed with the patient. An After Visit Summary was printed and given to the patient. 1. Have you been to the ER, urgent care clinic since your last visit? Hospitalized since your last visit? No    2. Have you seen or consulted any other health care providers outside of the [address] [place] [address] since your last visit? Include any pap smears or colon screening.  No    Pt would like to f/u on blood work from last visit.

## 2019-07-16 NOTE — PATIENT INSTRUCTIONS
[employer] Activation    Thank you for requesting access to [employer]. Please follow the instructions below to securely access and download your online medical record. [employer] allows you to send messages to your doctor, view your test results, renew your prescriptions, schedule appointments, and more. How Do I Sign Up? 1. In your internet browser, go to [email or web address]  2. Click on the First Time User? Click Here link in the Sign In box. You will be redirect to the New Member Sign Up page. 3. Enter your [employer] Access Code exactly as it appears below. You will not need to use this code after youve completed the sign-up process. If you do not sign up before the expiration date, you must request a new code. [employer] Access Code: [email or web address]  Expires: 2019  1:41 PM (This is the date your [employer] access code will )    4. Enter the last four digits of your Social Security Number (xxxx) and Date of Birth (mm/dd/yyyy) as indicated and click Submit. You will be taken to the next sign-up page. 5. Create a [employer] ID. This will be your [employer] login ID and cannot be changed, so think of one that is secure and easy to remember. 6. Create a [employer] password. You can change your password at any time. 7. Enter your Password Reset Question and Answer. This can be used at a later time if you forget your password. 8. Enter your e-mail address. You will receive e-mail notification when new information is available in [address] 9. Click Sign Up. You can now view and download portions of your medical record. 10. Click the Download Summary menu link to download a portable copy of your medical information. Additional Information    If you have questions, please visit the Frequently Asked Questions section of the [employer] website at [email or web address] Remember, [employer] is NOT to be used for urgent needs. For medical emergencies, dial 911.

## 2019-07-16 NOTE — PROGRESS NOTES
1. Have you been to the ER, urgent care clinic since your last visit? Hospitalized since your last visit?no    2. Have you seen or consulted any other health care providers outside of the [address] [place] [address] since your last visit? Include any pap smears or colon screening. No    3 most recent PHQ Screens 5/29/2019   PHQ Not Done -   Little interest or pleasure in doing things Not at all   Feeling down, depressed, irritable, or hopeless Not at all   Total Score PHQ 2 0     Chief Complaint   Patient presents with   [address] [place] [address] Referral Request     neurologist     Per Dr. Alice Portillo.,  verbal order given for needed amb poc labs.

## 2019-12-19 ENCOUNTER — OFFICE VISIT (OUTPATIENT)
Dept: NEUROLOGY | Age: 64
End: 2019-12-19

## 2019-12-19 VITALS
DIASTOLIC BLOOD PRESSURE: 73 MMHG | HEART RATE: 101 BPM | OXYGEN SATURATION: 99 % | HEIGHT: 67 IN | RESPIRATION RATE: 18 BRPM | TEMPERATURE: 98.1 F | BODY MASS INDEX: 28.69 KG/M2 | WEIGHT: 182.8 LBS | SYSTOLIC BLOOD PRESSURE: 110 MMHG

## 2019-12-19 DIAGNOSIS — R42 DIZZINESS: ICD-10-CM

## 2019-12-19 DIAGNOSIS — R47.81 SLURRED SPEECH: ICD-10-CM

## 2019-12-19 DIAGNOSIS — G62.9 NEUROPATHY: ICD-10-CM

## 2019-12-19 DIAGNOSIS — G90.1 DYSAUTONOMIA (HCC): ICD-10-CM

## 2019-12-19 DIAGNOSIS — I63.89 CEREBROVASCULAR ACCIDENT (CVA) DUE TO OTHER MECHANISM (HCC): Primary | ICD-10-CM

## 2019-12-19 RX ORDER — CALCIUM ACETATE 667 MG/1
2 TABLET ORAL
COMMUNITY
End: 2019-12-19 | Stop reason: SDUPTHER

## 2019-12-19 RX ORDER — CLOPIDOGREL BISULFATE 75 MG/1
75 TABLET ORAL DAILY
Qty: 30 TAB | Refills: 1 | Status: ON HOLD | OUTPATIENT
Start: 2019-12-19 | End: 2020-05-15

## 2019-12-19 NOTE — PATIENT INSTRUCTIONS
All test will be discussed at the next appointment. MRI of the Head: About This Test  What is it? MRI (magnetic resonance imaging) is a test that uses a magnetic field and pulses of radio wave energy to make pictures of the organs and structures inside the body. An MRI of the head can give your doctor information about your brain, eyes, ears, and nerves. When you have an MRI, you lie on a table and the table moves into the MRI machine. Why is this test done? An MRI of the head can help find problems such as tumors and infection. It also can check symptoms of a head injury or of diseases such as multiple sclerosis (MS) and stroke. How can you prepare for the test?  Talk to your doctor about all your health conditions before the test. For example, tell your doctor if:  · You are allergic to any medicines. · You are or might be pregnant. · You have a pacemaker, an artificial limb, any metal pins or metal parts in your body, metal heart valves, metal clips in your brain, metal implants in your ears, or any other implanted or prosthetic medical device. · You have an intrauterine device (IUD) in place. · You get nervous in confined spaces. You may need medicine to help you relax. · You wear a patch that contains medicine. · You have kidney disease. What happens before the test?  · You will remove all metal objects, such as hearing aids, dentures, jewelry, watches, and hairpins. · You may need to take off some of your clothes. You will be given a gown to wear during the test. If you do leave some clothes on, make sure you take everything out of your pockets. · You may have contrast material (dye) put into your arm through a tube called an IV. Contrast material helps doctors see specific organs, blood vessels, and most tumors. What happens during the test?  · You will lie on your back on a table that is part of the MRI scanner.  Your head, chest, and arms may be held with straps to help you lie still.  · The table will slide into the space that contains the magnet. A device called a coil may be placed over or wrapped around your head. · Inside the scanner you will hear a fan and feel air moving. You may hear tapping, thumping, or snapping noises. You may be given earplugs or headphones to reduce the noise. · You will be asked to hold still during the scan. You may be asked to hold your breath for short periods. · You may be alone in the scanning room, but a technologist will be watching you through a window and talking with you during the test.  What else should you know about the test?  · An MRI does not hurt. · You may feel warmth in the area being examined. This is normal.  · A dye (contrast material) that contains gadolinium may be used in this test. Be sure to tell your doctor if:  ? You are pregnant or think you may be pregnant. ? You have kidney problems. ? You've had more than one test that used gadolinium. · The [place] Food and Drug Administration (FDA) has safety warnings about gadolinium. But for most people, the benefit of its use in this test outweighs the risk. · If a dye is used, you may feel a quick sting or pinch and some coolness when the IV is started. The dye may give you a metallic taste in your mouth. Some people feel sick to their stomach or get a headache. · If you breastfeed and are concerned about whether the dye used in this test is safe, talk to your doctor. Most experts believe that very little dye passes into breast milk and even less is passed on to the baby. But if you prefer, you can store some of your breast milk ahead of time and use it for a day or two after the test.  How long does the test take? · The test usually takes 30 to 60 minutes but can take as long as 2 hours. What happens after the test?  · You will probably be able to go home right away, depending on the reason for the test.  · You can go back to your usual activities right away.   Follow-up care is a key part of your treatment and safety. Be sure to make and go to all appointments, and call your doctor if you are having problems. It's also a good idea to keep a list of the medicines you take. Ask your doctor when you can expect to have your test results. Where can you learn more? Go to http://aneudy-tamanna.info/. Enter B008 in the search box to learn more about \"MRI of the Head: About This Test.\"  Current as of: March 28, 2019  Content Version: 12.2  © 2791-4708 [employer]. Care instructions adapted under license by [employer] (which disclaims liability or warranty for this information). If you have questions about a medical condition or this instruction, always ask your healthcare professional. [address] any warranty or liability for your use of this information. Electroencephalogram (EEG): About This Test  What is it? An electroencephalogram (EEG) lets a doctor see the electrical activity of your brain. You will have small pads or patches attached to different places on your head. These are called electrodes. Wires connect the electrodes to a computer. The computer records the activity of the brain. This looks like wavy lines on the computer screen or on paper. Why is this test done? The test is often used to diagnose epilepsy. It helps a doctor know what types of seizures are happening. An EEG can also check brain activity in people with sleep disorders. It can also help a doctor know why a person passed out (lost consciousness). How can you prepare for the test?  · Tell your doctor if you are taking any medicines. Your doctor may ask you to stop taking certain medicines before the test. These include sedatives and tranquilizers, muscle relaxants, sleeping aids, and seizure medicines.   · Do not eat or drink anything with caffeine in it for 12 hours before the test. This includes cola, energy drinks, and chocolate. · Shampoo your hair and rinse with clear water the evening before or the morning of the test. Do not put any hair conditioner or oil on after you wash your hair. · Your doctor may ask you not to sleep the night before the test or to sleep for only about 4 or 5 hours. This is because some types of brain activity can only be seen if you are asleep. If your doctor asks you to get less sleep than normal, plan to have someone drive you to and from the test.  What happens during the test?  · You will lie on your back on a bed or table. Or you might relax in a chair with your eyes closed. · A technologist will attach the electrodes to different places on your head. Or you might get a cap with fixed electrodes on it. · You will lie still with your eyes closed. The technologist will tell you not to talk unless you need to. · The technologist may ask you to:  ? Breathe deeply and rapidly. This is called hyperventilating. ? Look at a bright, flashing light called a strobe. ? Go to sleep. If you can't fall asleep, you may get medicine to help you. What else should you know about the test?  · There is no pain. No electrical current goes through your body. · If you have a seizure disorder such as epilepsy, the flashing lights or hyperventilation may cause a seizure. The technologist is trained to take care of you if this happens. · If electrodes are put in your nose, they may tickle. In rare cases, they can also cause some soreness or a small amount of bleeding for 1 to 2 days after the test.  How long does the test take? · The test will take about 1 to 2 hours. What happens after the test?  · You will probably be able to go home right away. But if you didn't sleep your normal amount before the test, have someone drive you home. · You can go back to your usual activities right away. When should you call for help?   Watch closely for changes in your health, and be sure to contact your doctor if:  · You have any problems that you think may be from the test.  · You have any questions about the test or have not received your results. Follow-up care is a key part of your treatment and safety. Be sure to make and go to all appointments, and call your doctor if you are having problems. It's also a good idea to keep a list of the medicines you take. Ask your doctor when you can expect to have your test results. Where can you learn more? Go to http://aneudy-tamanna.info/. Enter F196 in the search box to learn more about \"Electroencephalogram (EEG): About This Test.\"  Current as of: March 28, 2019  Content Version: 12.2  © 0599-7879 [employer]. Care instructions adapted under license by [employer] (which disclaims liability or warranty for this information). If you have questions about a medical condition or this instruction, always ask your healthcare professional. [place] [address] any warranty or liability for your use of this information. Electromyogram (EMG) and Nerve Conduction Studies: About These Tests  What are they? An electromyogram (EMG) measures the electrical activity of your muscles when you are not using them (at rest) and when you tighten them (muscle contraction). Nerve conduction studies (NCS) measure how well and how fast the nerves can send electrical signals. EMG and nerve conduction studies are often done together. If they are done together, the nerve conduction studies are done before the EMG. Why are they done? You may need an EMG to find diseases that damage your muscles or nerves or to find why you cannot move your muscles (paralysis), why they feel weak, or why they twitch. You may need nerve conduction studies to find damage to the nerves that lead from the brain and spinal cord to the rest of the body (peripheral nervous system).  Nerve conduction studies are often used to help find nerve disorders, such as carpal tunnel syndrome. How can you prepare for these tests? · Tell your doctors ALL the medicines, vitamins, supplements, and herbal remedies you take. Some medicines can affect the test results. You may need to stop taking some medicines before you have this test.     · If you take aspirin or some other blood thinner, be sure to talk to your doctor. He or she will tell you if you should stop taking it before your test. Make sure that you understand exactly what your doctor wants you to do.     · Wear loose-fitting clothing. You may be given a hospital gown to wear.     · The electrodes for the test are attached to your skin. Your skin needs to be clean and free of sprays, oils, creams, and lotions. What happens during the tests? You lie on a table or bed or sit in a reclining chair so your muscles are relaxed. For an EMG:  · Your doctor will insert a needle electrode into a muscle. This will record the electrical activity while the muscle is at rest. You may feel a quick, sharp pain when the needle electrode is put into a muscle. · Your doctor will ask you to tighten the same muscle slowly and steadily while the electrical activity is recorded. · Your doctor may move the electrode to a different area of the muscle or a different muscle. For nerve conduction studies:  · Your doctor will attach two types of electrodes to your skin. ? One type of electrode is placed over a nerve and will give the nerve an electrical pulse. ? The other type of electrode is placed over the muscle that the nerve controls. It will record how long it takes the muscle to react to the electrical pulse. · You will be able to feel the electrical pulses. They are small shocks and are safe. What else should you know about these tests? · After an EMG, you may be sore and have a tingling feeling in your muscles for up to 2 days. You may have small bruises or swelling at the needle site. · For an EMG, you may be asked to sign a consent form. Talk to your doctor about any concerns you have about the need for the test, its risks, how it will be done, or what the results will mean. How long do they take? · An EMG may take 30 to 60 minutes. · Nerve conduction tests may take from 15 minutes to 1 hour or more. It depends on how many nerves and muscles your doctor tests. What happens after these tests? · If any of the test areas are sore:  ? Put ice or a cold pack on the area for 10 to 20 minutes at a time. Put a thin cloth between the ice and your skin. ? Take an over-the-counter pain medicine, such as acetaminophen (Tylenol), ibuprofen (Advil, Motrin), or naproxen (Aleve). Be safe with medicines. Read and follow all instructions on the label. · You will probably be able to go home right away. · You can go back to your usual activities right away. When should you call for help? Watch closely for changes in your health, and be sure to contact your doctor if:  · Muscle pain from an EMG test gets worse or you have swelling, tenderness, or pus at any of the needle sites. · You have any problems that you think may be from the test.  · You have any questions about the test or have not received your results. Follow-up care is a key part of your treatment and safety. Be sure to make and go to all appointments, and call your doctor if you are having problems. It's also a good idea to keep a list of the medicines you take. Ask your doctor when you can expect to have your test results. Where can you learn more? Go to http://aneudy-tamanna.info/. Enter Z884 in the search box to learn more about \"Electromyogram (EMG) and Nerve Conduction Studies: About These Tests. \"  Current as of: March 28, 2019  Content Version: 12.2  © 1025-3474 [employer]. Care instructions adapted under license by [employer] (which disclaims liability or warranty for this information).  If you have questions about a medical condition or this instruction, always ask your healthcare professional. [place] [address] any warranty or liability for your use of this information.

## 2019-12-19 NOTE — PROGRESS NOTES
Neurology Consult Note      HISTORY PROVIDED BY: patient and spouse    Chief Complaint:   Chief Complaint   Patient presents with    Dysarthria    Dizziness    New Patient      Subjective:    Rafael Antunez is a 75 y.o. right handed male who presents in consultation for dysarthria, dizziness, numbness. Patient was accompanied by his wife to the visit. This is 52-year-old right-handed black male with history of degenerative joint disease, end-stage renal disease dialysis dependent, hypertension, chronic pain syndrome, congestive heart failure, sleep apnea, peptic ulcer disease, was referred to the clinic to evaluate for speech difficulty and dizziness. Wife says she started noticing her  speech slurred for about 3 months, according to wife, the symptom comes and goes however, lately it has been more persistent. Patient says he is also experiencing dizziness, dizziness is mostly getting up from sitting position to standing position, he says it is more after dialysis, and he has had periods of blackout spells mostly after dialysis. Patient says he has been having numbness and tingling sensation of the extremities, this has been getting worse over time. He says the numbness mostly in the upper extremities tend to wake him up at night. He also noted that his hands are becoming weaker and weaker and he tends to drop things. He says also that the symptoms come and go. Patient says on occasions he cannot even feel with his hands, and this is getting more frequent and worse. He says tend to forget things and get confused at times. He denies dysphagia or odynophagia.   Review of Systems - General ROS: positive for  - fatigue, night sweats and sleep disturbance  Psychological ROS: positive for - anxiety, concentration difficulties, memory difficulties and sleep disturbances  Ophthalmic ROS: positive for - blurry vision, decreased vision and double vision  ENT ROS: positive for - headaches, tinnitus, vertigo and visual changes  Allergy and Immunology ROS: negative  Hematological and Lymphatic ROS: negative  Endocrine ROS: negative  Respiratory ROS: no cough, shortness of breath, or wheezing  Cardiovascular ROS: no chest pain or dyspnea on exertion  Gastrointestinal ROS: no abdominal pain, change in bowel habits, or black or bloody stools  Genito-Urinary ROS: no dysuria, trouble voiding, or hematuria  Musculoskeletal ROS: positive for - gait disturbance, joint pain, joint stiffness, joint swelling, muscle pain and muscular weakness  Neurological ROS: positive for - confusion, dizziness, gait disturbance, headaches, impaired coordination/balance, memory loss, numbness/tingling, speech problems, tremors, visual changes and weakness  Dermatological ROS: negative  Past Medical History:   Diagnosis Date    Adverse effect of anesthesia     \"MY BLOOD PRESSURE DROPS ,\"    Alzheimer's disease ([facility] [address] 2/18/2011    PT DENIES    Arthritis     Chronic kidney disease     KIDNEYS FAILED R/T HTN    Chronic pain     LEFT HIP PAIN    Constipation     Diarrhea     Heart failure ([facility] [address] 6/2015    CHF    Hemodialysis patient [facility])     M-W-F    Joint pain     Osteoarthrosis, unspecified whether generalized or localized, unspecified site 2/18/2011    Other unknown and unspecified cause of morbidity or mortality     PT DOSES OFF FREQUENTLY    PUD (peptic ulcer disease)     Renal Disease 2/18/2011    Right inguinal hernia 3/1/2016    Sleep apnea     Snoring     SOB (shortness of breath)     Unspecified asthma(493.90) 2/18/2011    Unspecified essential hypertension 2/18/2011    Ventral incisional hernia 3/1/2016    Visual disturbance       Past Surgical History:   Procedure Laterality Date    CARDIAC SURG PROCEDURE UNLIST  4/29/15    CARDIAC CATHGERIZATION    HX GI      COLONOSCOPY    HX HEENT      TONSILLECTOMY    HX HERNIA REPAIR  3/8/16    Repair of right inguinal hernia with mesh,Repair of ventral incisional hernia with mesh    HX ORTHOPAEDIC Right 10/13/15     THR    HX ORTHOPAEDIC      left hip    HX OTHER SURGICAL  10/11/2016    revision of AV fistula left arm with repair of pseudoaneurysm    HX SMALL BOWEL RESECTION      HX UROLOGICAL Bilateral 2005    REMOVAL OF KIDNEYS    HX VASCULAR ACCESS      LT ARM WITH PORTS FOR DYALISIS      Social History     Socioeconomic History    Marital status:      Spouse name: Not on file    Number of children: Not on file    Years of education: Not on file    Highest education level: Not on file   Occupational History    Not on file   Social Needs    Financial resource strain: Not on file    Food insecurity:     Worry: Not on file     Inability: Not on file    Transportation needs:     Medical: Not on file     Non-medical: Not on file   Tobacco Use    Smoking status: Never Smoker    Smokeless tobacco: Never Used   Substance and Sexual Activity    Alcohol use: No    Drug use: No    Sexual activity: Not on file   Lifestyle    Physical activity:     Days per week: Not on file     Minutes per session: Not on file    Stress: Not on file   Relationships    Social connections:     Talks on phone: Not on file     Gets together: Not on file     Attends Restorationism service: Not on file     Active member of club or organization: Not on file     Attends meetings of clubs or organizations: Not on file     Relationship status: Not on file    Intimate partner violence:     Fear of current or ex partner: Not on file     Emotionally abused: Not on file     Physically abused: Not on file     Forced sexual activity: Not on file   Other Topics Concern    Not on file   Social History Narrative    Not on file     Family History   Problem Relation Age of Onset    Cancer Mother         BREAST    Cancer Sister         THROAT    Stroke Brother     Anesth Problems Neg Hx          Objective:   ROS  As per HPI  Allergies   Allergen Reactions    Milk Anaphylaxis    Shellfish Derived Anaphylaxis    Benadryl [Diphenhydramine Hcl] Nausea and Vomiting    Zofran Odt [Ondansetron] Nausea and Vomiting    Iodinated Contrast Media Other (comments)    Peanut Shortness of Breath    Penicillins Unknown (comments)        Meds:  Outpatient Medications Prior to Visit   Medication Sig Dispense Refill    b complex-vitamin c-folic acid (RENAL CAPS) 1 mg capsule Take 1 Cap by mouth.  midodrine (PROAMITINE) 5 mg tablet Take  by mouth three (3) times daily.  bisacodyl (DULCOLAX, BISACODYL,) 5 mg EC tablet Take 5 mg by mouth daily as needed for Constipation.  cyclobenzaprine (FLEXERIL) 10 mg tablet TAKE ONE TABLET BY MOUTH THREE TIMES DAILY AS NEEDED FOR MUSCLE SPASM 90 Tab 0    calcium acetate (PHOSLO) 667 mg cap Take 3 Caps by mouth three (3) times daily (with meals). Patient claims to take 1cap TID      aspirin delayed-release 81 mg tablet Take 81 mg by mouth daily.  acetaminophen (TYLENOL) 325 mg tablet Take 2 Tabs by mouth every six (6) hours. Indications: PAIN 60 Tab 0    albuterol (PROVENTIL HFA, VENTOLIN HFA, PROAIR HFA) 90 mcg/actuation inhaler Take 2 Puffs by inhalation every four (4) hours as needed for Wheezing. 1 Inhaler 12    calcium acetate (PHOSLO) 667 mg tab tablet Take 2 Tabs by mouth.  SYMBICORT 160-4.5 mcg/actuation HFAA Take 2 Puffs by inhalation two (2) times a day. 1 Inhaler 12    guaiFENesin ER (MUCINEX) 600 mg ER tablet Take 600 mg by mouth every twelve (12) hours.  fluticasone propion-salmeterol (ADVAIR DISKUS) 250-50 mcg/dose diskus inhaler Take 1 Puff by inhalation every twelve (12) hours. Not taking because of cost; never filled prescription      carvedilol (COREG) 6.25 mg tablet Take 12.5 mg by mouth every other day. 60 Tab 12    sildenafil citrate (VIAGRA) 100 mg tablet Take 1 Tab by mouth as needed. 8 Tab 12    losartan (COZAAR) 25 mg tablet Take  by mouth every other day. No facility-administered medications prior to visit. Imaging:  MRI Results (most recent):  Results from Hospital Encounter encounter on 12/12/13   MRI BRAIN WO CONT    Narrative **Final Report**       ICD Codes / Adm. Diagnosis: 780.93   / Memory loss    Examination:  MR BRAIN WO CON  - [id number] - Dec 12 2013  4:27PM  Accession No:  [id number]  Reason:  short-term memory loss      REPORT:  INDICATION: Problem with memory. Short-term memory loss. Patient on dialysis   3 days a week  TECHNIQUE: Sagittal T1, FLAIR and T2-weighted images were acquired. The   patient had concern about stain in the magnet with \"unable to breathe\" and   seemed claustrophobic. Patient was going to contact his physician about   getting some premedication. If this can be achieved before the end of the   ear the remaining imaging sequences can be performed at no additional charge   and an addendum will be made. COMPARISON: None available. FINDINGS:  The ventricular size and sulcal size are within the upper limits of normal   for age. Numerous foci of T2 hyperintensity in the cerebral white matter with   confluence in the periventricular distribution has a pattern most consistent   with chronic small vessel type ischemic changes. No evidence of hemorrhage, mass, acute infarction or abnormal extra-axial   fluid collections. The distal carotid and vertebral basilar systems are tortuous which is   probably related to chronic hypertension. Otherwise normal appearing   flow-voids in vertebral basilar and carotid artery systems. There is hypointensity throughout the marrow which is likely related to   chronic dialysis. The craniocervical junction is otherwise unremarkable. IMPRESSION:  1. Chronic small vessel ischemic white matter disease. 2. No acute intracranial abnormality demonstrated.     The remaining standard study sequences of axial T1, gradient echo T2 and   diffusion sequences can be acquired if the patient can receive p.o.   premedication for the exam and return before the end of the year. If this is   achieved an addendum will be given after the additional imaging. Signing/Reading Doctor: David Fulton ([id number])    Approved: David Fulton ([id number])  Dec 12 2013  5:39PM                                  CT Results (most recent):  Results from Hospital Encounter encounter on 03/26/19   CT CHEST WO CONT    Narrative EXAM:  CT CHEST WO CONT    INDICATION:  Interstitial lung disease. Essential hypertension. COMPARISON: Chest radiographs 11/20/2018    TECHNIQUE: Helical CT of the chest is performed without intravenous contrast.  Coronal and sagittal reformatted images are obtained. Helical chest CT is  performed with supine inspiration, supine expiration, and prone inspiration per  the high-resolution chest CT protocol. CT dose reduction was achieved through  use of a standardized protocol tailored for this examination and automatic  exposure control for dose modulation. Adaptive statistical iterative  reconstruction (ASIR) was utilized. FINDINGS:   The visualized thyroid gland is unremarkable. There is mild dilatation of the  ascending aorta measuring 3.5 cm in diameter. The main pulmonary artery is  dilated measuring 3.7 cm in diameter in keeping with pulmonary artery  hypertension. There is coronary artery atherosclerosis. There is moderate to  marked cardiomegaly. There is no pericardial effusion. There is a mildly enlarged left axillary lymph node measuring 1.5 x 1.5 cm  (series 5, image 21). Otherwise, there are no enlarged axillary, mediastinal, or  hilar lymph nodes. There are multiple scattered bilateral subcentimeter nodules that are  predominantly groundglass or part solid. There is no lung mass or consolidation. There is no pleural effusion or pneumothorax. The central airways are clear. There is no subpleural reticular opacity, honeycombing, or bronchiectasis. Expiratory images demonstrate no evidence for air trapping.     There are several liver cysts and additional subcentimeter low-density liver  lesions that are too small to characterize but likely represent cysts. Surgical  clips are noted in the left upper quadrant. There is no acute abnormality in the  visualized upper abdomen. There is no aggressive bony lesion. Impression IMPRESSION:   1. Multiple scattered bilateral subcentimeter lung nodules that are  predominantly groundglass or part solid are nonspecific. Differential  considerations include but are not limited to nonspecific infection or  inflammation. Follow-up routine chest CT without contrast is recommended in 3  months. 2. No evidence for interstitial lung disease or pulmonary fibrosis. 3. Cardiomegaly. Mild dilatation of the ascending aorta measuring 3.5 cm in  diameter. Dilatation of the main pulmonary artery suggesting pulmonary artery  hypertension. 4. A mildly enlarged left axillary lymph node is nonspecific. Attention on  follow-up is suggested.     23X              Reviewed records in [employer] tab today    Lab Review   Results for orders placed or performed during the hospital encounter of 05/01/19   CULTURE, RESPIRATORY/SPUTUM/BRONCH W GRAM STAIN   Result Value Ref Range    Special Requests: NO SPECIAL REQUESTS      GRAM STAIN FEW WBCS SEEN      GRAM STAIN OCCASIONAL GRAM POSITIVE COCCI      Culture result: MODERATE NORMAL RESPIRATORY DEVON     CBC WITH AUTOMATED DIFF   Result Value Ref Range    WBC 6.6 4.1 - 11.1 K/uL    RBC 3.04 (L) 4.10 - 5.70 M/uL    HGB 9.9 (L) 12.1 - 17.0 g/dL    HCT 32.6 (L) 36.6 - 50.3 %    .2 (H) 80.0 - 99.0 FL    MCH 32.6 26.0 - 34.0 PG    MCHC 30.4 30.0 - 36.5 g/dL    RDW 12.4 11.5 - 14.5 %    PLATELET 998 896 - 832 K/uL    MPV 10.3 8.9 - 12.9 FL    NRBC 0.0 0  WBC    ABSOLUTE NRBC 0.00 0.00 - 0.01 K/uL    NEUTROPHILS 62 32 - 75 %    LYMPHOCYTES 20 12 - 49 %    MONOCYTES 10 5 - 13 %    EOSINOPHILS 7 0 - 7 %    BASOPHILS 1 0 - 1 %    IMMATURE GRANULOCYTES 0 0.0 - 0.5 %    ABS. NEUTROPHILS 4.1 1.8 - 8.0 K/UL    ABS. LYMPHOCYTES 1.3 0.8 - 3.5 K/UL    ABS. MONOCYTES 0.7 0.0 - 1.0 K/UL    ABS. EOSINOPHILS 0.5 (H) 0.0 - 0.4 K/UL    ABS. BASOPHILS 0.0 0.0 - 0.1 K/UL    ABS. IMM. GRANS. 0.0 0.00 - 0.04 K/UL    DF AUTOMATED     RENAL FUNCTION PANEL   Result Value Ref Range    Sodium 138 136 - 145 mmol/L    Potassium 3.3 (L) 3.5 - 5.1 mmol/L    Chloride 98 97 - 108 mmol/L    CO2 33 (H) 21 - 32 mmol/L    Anion gap 7 5 - 15 mmol/L    Glucose 89 65 - 100 mg/dL    BUN 49 (H) 6 - 20 MG/DL    Creatinine 8.14 (H) 0.70 - 1.30 MG/DL    BUN/Creatinine ratio 6 (L) 12 - 20      GFR est AA 8 (L) >60 ml/min/1.73m2    GFR est non-AA 7 (L) >60 ml/min/1.73m2    Calcium 9.6 8.5 - 10.1 MG/DL    Phosphorus 4.6 2.6 - 4.7 MG/DL    Albumin 3.3 (L) 3.5 - 5.0 g/dL   CBC WITH AUTOMATED DIFF   Result Value Ref Range    WBC 8.1 4.1 - 11.1 K/uL    RBC 3.11 (L) 4.10 - 5.70 M/uL    HGB 10.0 (L) 12.1 - 17.0 g/dL    HCT 32.6 (L) 36.6 - 50.3 %    .8 (H) 80.0 - 99.0 FL    MCH 32.2 26.0 - 34.0 PG    MCHC 30.7 30.0 - 36.5 g/dL    RDW 12.7 11.5 - 14.5 %    PLATELET 491 093 - 197 K/uL    MPV 10.6 8.9 - 12.9 FL    NRBC 0.0 0  WBC    ABSOLUTE NRBC 0.00 0.00 - 0.01 K/uL    NEUTROPHILS 56 32 - 75 %    LYMPHOCYTES 24 12 - 49 %    MONOCYTES 10 5 - 13 %    EOSINOPHILS 8 (H) 0 - 7 %    BASOPHILS 1 0 - 1 %    IMMATURE GRANULOCYTES 1 (H) 0.0 - 0.5 %    ABS. NEUTROPHILS 4.6 1.8 - 8.0 K/UL    ABS. LYMPHOCYTES 1.9 0.8 - 3.5 K/UL    ABS. MONOCYTES 0.8 0.0 - 1.0 K/UL    ABS. EOSINOPHILS 0.6 (H) 0.0 - 0.4 K/UL    ABS. BASOPHILS 0.1 0.0 - 0.1 K/UL    ABS. IMM.  GRANS. 0.1 (H) 0.00 - 0.04 K/UL    DF AUTOMATED     METABOLIC PANEL, BASIC   Result Value Ref Range    Sodium 136 136 - 145 mmol/L    Potassium 3.2 (L) 3.5 - 5.1 mmol/L    Chloride 97 97 - 108 mmol/L    CO2 27 21 - 32 mmol/L    Anion gap 12 5 - 15 mmol/L    Glucose 99 65 - 100 mg/dL    BUN 58 (H) 6 - 20 MG/DL    Creatinine 8.99 (H) 0.70 - 1.30 MG/DL BUN/Creatinine ratio 6 (L) 12 - 20      GFR est AA 7 (L) >60 ml/min/1.73m2    GFR est non-AA 6 (L) >60 ml/min/1.73m2    Calcium 9.9 8.5 - 10.1 MG/DL   C REACTIVE PROTEIN, QT   Result Value Ref Range    C-Reactive protein 1.51 (H) 0.00 - 0.60 mg/dL        Exam:  Visit Vitals  /73 (BP 1 Location: Right arm, BP Patient Position: Sitting)   Pulse (!) 101   Temp 98.1 °F (36.7 °C) (Temporal)   Resp 18   Ht 5' 7\" (1.702 m)   Wt 182 lb 12.8 oz (82.9 kg)   SpO2 99%   BMI 28.63 kg/m²     General:  Alert, cooperative, no distress. Head:  Normocephalic, without obvious abnormality, atraumatic. Respiratory:  Heart:   Non labored breathing  Regular rate and rhythm, no murmurs   Neck:   2+ carotids, no bruits   Extremities: Warm, no cyanosis or edema. Pulses: 2+ radial pulses. Neurologic:  MS: Alert and oriented x 4, speech intact. Language intact, able to name, repeat, and follow all commands. Attention and fund of knowledge appropriate. Recent and remote memory intact. Cranial Nerves:  II: visual fields Full to confrontation   II: pupils Equal, round, reactive to light   II: optic disc No papilledema   III,VII: ptosis none   III,IV,VI: extraocular muscles  EOMI, no nystagmus or diplopia   V: facial light touch sensation  normal   VII: facial muscle function   symmetric   VIII: hearing intact   IX: soft palate elevation  normal   XI: trapezius strength  5/5   XI: sternocleidomastoid strength 5/5   XII: tongue  Midline     Motor: normal bulk and tone, mild tremor              Strength: 5-/5 upper extremity, 4+/5 lower extremity , no PD  Sensory: Decreased sensation to LT, PP, temperature and vibration bilateral lower extremity up to the knee, upper extremity up to the elbow  Coordination: FTN and HTS abnormal, CHRISTY intact,Romberg abnormal  Gait: Unsteady gait, unable to heel, toe, and tandem walk  Reflexes: 2+ symmetric. Plantar: Mute           Assessment/Plan       ICD-10-CM ICD-9-CM    1.  Cerebrovascular accident (CVA) due to other mechanism ([facility] [address] I63.89 434.91    2. Slurred speech R47.81 784.59 EEG      PROTEIN ELECTROPHORESIS      CRP, HIGH SENSITIVITY      MRI BRAIN WO CONT      HOMOCYSTEINE, PLASMA   3. Neuropathy G62.9 355.9 ALDOLASE      HALIE, DIRECT, W/REFLEX      VITAMIN B12      RHEUMATOID FACTOR, QL      SED RATE (ESR)      ANGIOTENSIN CONVERTING ENZYME      CK      RPR      VITAMIN B6      PROTEIN ELECTROPHORESIS      CRP, HIGH SENSITIVITY      MRI BRAIN WO CONT      HOMOCYSTEINE, PLASMA      EMG NCV MOTOR WITH F/WAVE PER NERVE   4. Dysautonomia (HCC) G90.1 337.9 ALDOLASE      HALIE, DIRECT, W/REFLEX      VITAMIN B12      RHEUMATOID FACTOR, QL      SED RATE (ESR)      ANGIOTENSIN CONVERTING ENZYME      CK      RPR      VITAMIN B6      EEG      PROTEIN ELECTROPHORESIS   5. Dizziness R42 780.4 ALDOLASE      HALIE, DIRECT, W/REFLEX      VITAMIN B12      RHEUMATOID FACTOR, QL      SED RATE (ESR)      ANGIOTENSIN CONVERTING ENZYME      CK      RPR      VITAMIN B6      EEG      PROTEIN ELECTROPHORESIS      MRI BRAIN WO CONT      HOMOCYSTEINE, PLASMA   Plan:  Plavix to 75 mg p.o. daily  MRI of the brain  without gadolinium  EEG  EMG/nerve conduction study all extremities  Labs for autoimmune work-up, CK, aldolase, C-reactive protein, homocystine, vitamin B12, ESR, RPR. Protein serum electrophoresis, vitamin B6. Thank you very much for this consultation.    Signed:  Kassie Cordero MD  12/19/2019

## 2019-12-31 ENCOUNTER — OFFICE VISIT (OUTPATIENT)
Dept: NEUROLOGY | Age: 64
End: 2019-12-31

## 2019-12-31 DIAGNOSIS — G56.03 BILATERAL CARPAL TUNNEL SYNDROME: ICD-10-CM

## 2019-12-31 DIAGNOSIS — G62.9 POLYNEUROPATHY: Primary | ICD-10-CM

## 2019-12-31 DIAGNOSIS — R20.2 PARESTHESIA: ICD-10-CM

## 2019-12-31 NOTE — PROGRESS NOTES
[address], [address], [address]  p: [phone]  f: [phone]    Test Date:  2019    Patient: Chris Leon : 1955 Physician: Celestine Paz   Sex: male Height: 5' 7\" Ref Phys:    ID#: [id number] Weight: 182 lbs. Technician: Gonsalo Douglass     Patient Complaints:  Numbness and tingling sensation all 4 extremities, weakness. Medications: See chart . Patient History / Exam: This is a 57-year-old male right-handed black male who is being evaluated for numbness or tingling sense of all extremities, difficulty walking, weakness and pain. NCV & EMG Findings:  Evaluation of the left Fibular motor nerve showed reduced amplitude (1.3 mV) and decreased conduction velocity (B Fib-Ankle, 29 m/s). The right Fibular motor nerve showed no response (Ankle), no response (B Fib), and no response (Poplt). The left tibial motor nerve showed reduced amplitude (1.0 mV) and decreased conduction velocity (Knee-Ankle, 30 m/s). The right tibial motor nerve showed no response (Ankle) and no response (Knee). The right median sensory nerve showed prolonged distal peak latency (4.3 ms) and decreased conduction velocity (Wrist-2nd Digit, 33 m/s). The left Sup Fibular sensory and the right Sup Fibular sensory nerves showed no response (14 cm). The right sural sensory nerve showed no response (Calf). The right ulnar sensory nerve showed prolonged distal peak latency (4.5 ms) and decreased conduction velocity (Wrist-5th Digit, 31 m/s). All remaining nerves (as indicated in the following tables) were within normal limits. All examined muscles (as indicated in the following table) showed no evidence of electrical instability. Impression: This is a grossly abnormal study. There is electrodiagnostic evidence of sensorimotor polyneuropathy bilateral lower extremity, sensory and motor neuropathy bilateral median and ulnar nerve. This is consistent with bilateral carpal tunnel syndrome, ulnar neuropathy, and polyneuropathy of the lower extremity. 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 Etiology yet to be determined. Recommendations: Carpal tunnel release suggested.   CIDP work-up suggested.      ___________________________          Nerve Conduction Studies  Anti Sensory Summary Table     Stim Site NR Peak (ms) Norm Peak (ms) P-T Amp (µV) Norm P-T Amp Site1 Site2 Delta-P (ms) Dist (cm) Adal (m/s) Norm Adal (m/s)   Right Median Anti Sensory (2nd Digit)  35.7°C   Wrist    4.3 <3.6 28.6 >10 Wrist 2nd Digit 4.3 14.0 33 >39   Left Sup Fibular Anti Sensory (Ant Lat Mall)  35.5°C   14 cm NR  <4.4  >5.0 14 cm Ant Lat Mall  14.0  >32   Right Sup Fibular Anti Sensory (Ant Lat Mall)  35.4°C   14 cm NR  <4.4  >5.0 14 cm Ant Lat Mall  14.0  >32   Left Sural Anti Sensory (Lat Mall)  33.9°C   Calf    3.1 <4.0 27.4 >5.0 Calf Lat Mall 3.1 14.0 45 >35   Right Sural Anti Sensory (Lat Mall)  35.1°C   Calf NR  <4.0  >5.0 Calf Lat Mall  14.0  >35   Right Ulnar Anti Sensory (5th Digit)  35.7°C   Wrist    4.5 <3.7 25.1 >15.0 Wrist 5th Digit 4.5 14.0 31 >38     Motor Summary Table     Stim Site NR Onset (ms) Norm Onset (ms) O-P Amp (mV) Norm O-P Amp Site1 Site2 Delta-0 (ms) Dist (cm) Adal (m/s) Norm Adal (m/s)   Left Fibular Motor (Ext Dig Brev)  35.5°C   Ankle    5.0 <6.1 1.3 >2.5 B Fib Ankle 11.4 33.0 29 >38   B Fib    16.4  0.4  Poplt B Fib 2.4 10.0 42 >40   Poplt    18.8  0.8          Right Fibular Motor (Ext Dig Brev)  35.6°C   Ankle NR  <6.1  >2.5 B Fib Ankle  0.0  >38   B Fib NR     Poplt B Fib  10.0  >40   Poplt NR             Right Median Motor (Abd Poll Brev)  35.8°C   Wrist    3.5 <4.2 8.5 >5 Elbow Wrist 5.3 27.0 51 >50   Elbow    8.8  6.4          Left Tibial Motor (Abd Virgen Brev)  35.6°C   Ankle    3.8 <6.1 1.0 >3.0 Knee Ankle 13.7 41.0 30 >35   Knee    17.5  0.4          Right Tibial Motor (Abd Virgen Brev)  35.6°C   Ankle NR  <6.1  >3.0 Knee Ankle  0.0  >35   Knee NR             Right Ulnar Motor (Abd Dig Min)  35.8°C   Wrist    3.3 <4.2 9.6 >3 B Elbow Wrist 4.5 25.0 56 >53   B Elbow    7.8  8.2  A Elbow B Elbow 1.7 10.0 59 >53   A Elbow    9.5  7.1            EMG+     Side Muscle Nerve Root Ins Act Fibs Psw Amp Dur Poly Recrt Int Jaja Benton Comment   Right Abd Dig Min Ulnar C8-T1 Nml Nml Nml Nml Nml 0 Nml Nml    Right VastusMed Femoral L2-4 Nml Nml Nml Nml Nml 0 Nml Nml    Right VastusLat Femoral L2-4 Nml Nml Nml Nml Nml 0 Nml Nml    Right QuadratusFem QuadFemoris L4-5, S1 Nml Nml Nml Nml Nml 0 Nml Nml    Right AntTibialis Dp Br Fibular L4-5 Nml Nml Nml Nml Nml 0 Nml Nml    Right Fibularis Long Sup Br Fibular L5-S1 Nml Nml Nml Nml Nml 0 Nml Nml    Right Deltoid Axillary C5-6 Nml Nml Nml Nml Nml 0 Nml Nml    Right Biceps Musculocut C5-6 Nml Nml Nml Nml Nml 0 Nml Nml    Right Triceps Radial C6-7-8 Nml Nml Nml Nml Nml 0 Nml Nml    Right BrachioRad Radial C5-6 Nml Nml Nml Nml Nml 0 Nml Nml    Right Abd Poll Brev Median C8-T1 Nml Nml Nml Nml Nml 0 Nml Nml        Nerve Conduction Studies  Anti Sensory Left/Right Comparison     Stim Site L Lat (ms) R Lat (ms) L-R Lat (ms) L Amp (µV) R Amp (µV) L-R Amp (%) Site1 Site2 L Adal (m/s) R Adal (m/s) L-R Adal (m/s)   Median Anti Sensory (2nd Digit)  35.7°C   Wrist  4.3   28.6  Wrist 2nd Digit  33    Sup Fibular Anti Sensory (Ant Lat Mall)  35.5°C   14 cm       14 cm Ant Lat Mall      Sural Anti Sensory (Lat Mall)  33.9°C   Calf 3.1   27.4   Calf Lat Mall 45     Ulnar Anti Sensory (5th Digit)  35.7°C   Wrist  4.5   25.1  Wrist 5th Digit  31      Motor Left/Right Comparison     Stim Site L Lat (ms) R Lat (ms) L-R Lat (ms) L Amp (mV) R Amp (mV) L-R Amp (%) Site1 Site2 L Adal (m/s) R Adal (m/s) L-R Adal (m/s)   Fibular Motor (Ext Dig Brev)  35.5°C   Ankle 5.0   1.3   B Fib Ankle 29     B Fib 16.4   0.4   Poplt B Fib 42     Poplt 18.8   0.8          Median Motor (Abd Poll Brev)  35.8°C   Wrist  3.5   8.5  Elbow Wrist  51    Elbow 8.8   6.4         Tibial Motor (Abd Virgen Brev)  35.6°C   Ankle 3.8   1.0   Knee Ankle 30     Knee 17.5   0.4          Ulnar Motor (Abd Dig Min)  35.8°C   Wrist  3.3   9.6  B Elbow Wrist  56    B Elbow  7.8   8.2  A Elbow B Elbow  59    A Elbow  9.5   7.1               Waveforms:

## 2020-01-14 ENCOUNTER — HOSPITAL ENCOUNTER (OUTPATIENT)
Dept: MRI IMAGING | Age: 65
Discharge: HOME | End: 2020-01-14
Attending: PSYCHIATRY & NEUROLOGY

## 2020-01-14 DIAGNOSIS — G62.9 NEUROPATHY: ICD-10-CM

## 2020-01-14 DIAGNOSIS — R47.81 SLURRED SPEECH: ICD-10-CM

## 2020-01-14 DIAGNOSIS — R42 DIZZINESS: ICD-10-CM

## 2020-01-16 ENCOUNTER — HOSPITAL ENCOUNTER (OUTPATIENT)
Dept: NEUROLOGY | Age: 65
Discharge: HOME | End: 2020-01-16
Attending: PSYCHIATRY & NEUROLOGY
Payer: MEDICARE

## 2020-01-16 DIAGNOSIS — R47.81 SLURRED SPEECH: ICD-10-CM

## 2020-01-16 DIAGNOSIS — G90.1 DYSAUTONOMIA (HCC): ICD-10-CM

## 2020-01-16 DIAGNOSIS — R42 DIZZINESS: ICD-10-CM

## 2020-01-16 PROCEDURE — 95816 EEG AWAKE AND DROWSY: CPT

## 2020-01-17 NOTE — PROCEDURES
[facility]        Electroencephalogram    Procedure ID: [id number] Procedure Date: 01/17/2020   Patient Name: Moises Hutton YOB: 1955   Procedure Type: Routine Medical Record No: [id number]     INDICATION: dizziness    Medications:  Current Outpatient Medications   Medication Sig    b complex-vitamin c-folic acid (RENAL CAPS) 1 mg capsule Take 1 Cap by mouth.  clopidogrel (PLAVIX) 75 mg tab Take 1 Tab by mouth daily.  midodrine (PROAMITINE) 5 mg tablet Take  by mouth three (3) times daily.  SYMBICORT 160-4.5 mcg/actuation HFAA Take 2 Puffs by inhalation two (2) times a day.  guaiFENesin ER (MUCINEX) 600 mg ER tablet Take 600 mg by mouth every twelve (12) hours.  fluticasone propion-salmeterol (ADVAIR DISKUS) 250-50 mcg/dose diskus inhaler Take 1 Puff by inhalation every twelve (12) hours. Not taking because of cost; never filled prescription    bisacodyl (DULCOLAX, BISACODYL,) 5 mg EC tablet Take 5 mg by mouth daily as needed for Constipation.  cyclobenzaprine (FLEXERIL) 10 mg tablet TAKE ONE TABLET BY MOUTH THREE TIMES DAILY AS NEEDED FOR MUSCLE SPASM    carvedilol (COREG) 6.25 mg tablet Take 12.5 mg by mouth every other day.  sildenafil citrate (VIAGRA) 100 mg tablet Take 1 Tab by mouth as needed.  losartan (COZAAR) 25 mg tablet Take  by mouth every other day.  calcium acetate (PHOSLO) 667 mg cap Take 3 Caps by mouth three (3) times daily (with meals). Patient claims to take 1cap TID    aspirin delayed-release 81 mg tablet Take 81 mg by mouth daily.  acetaminophen (TYLENOL) 325 mg tablet Take 2 Tabs by mouth every six (6) hours. Indications: PAIN    albuterol (PROVENTIL HFA, VENTOLIN HFA, PROAIR HFA) 90 mcg/actuation inhaler Take 2 Puffs by inhalation every four (4) hours as needed for Wheezing. No current facility-administered medications for this encounter.         DESCRIPTION OF PROCEDURE: Electrodes were applied in accordance with the international 10-20 system of electrode placement. EEG was reviewed in both bipolar and referential montages    Description of Activity:  During wakefulness, there is continuous runs of 8 Hz symmetric posterior alpha rhythm, that attenuate symmetrically with eye opening. Low voltage beta activity occurs symmetrically at the anterior head regions bilaterally. During drowsiness, there is attenuation of the alpha rhythm and low voltage theta activity occurs bilaterally. Early stages of sleep are seen and symmetric. Intermittent photic stimulation was performed and did not induce posterior driving responses. Occasional left frontal or frontocentral and right temporal sharp discharges mainly when drowsy or asleep. No seizures or focal asymmetry. Clinical Interpretation: This EEG, performed during wakefulness and sleep is abnormal. There are occasional left frontal, frontocentral and right temporal occasional sharp discharges. No focal asymmetry or seizures seen.

## 2020-01-30 ENCOUNTER — OFFICE VISIT (OUTPATIENT)
Dept: NEUROLOGY | Age: 65
End: 2020-01-30

## 2020-01-30 VITALS
DIASTOLIC BLOOD PRESSURE: 67 MMHG | HEIGHT: 67 IN | WEIGHT: 178 LBS | HEART RATE: 103 BPM | TEMPERATURE: 98 F | RESPIRATION RATE: 18 BRPM | OXYGEN SATURATION: 98 % | BODY MASS INDEX: 27.94 KG/M2 | SYSTOLIC BLOOD PRESSURE: 99 MMHG

## 2020-01-30 DIAGNOSIS — G40.209 PARTIAL SYMPTOMATIC EPILEPSY WITH COMPLEX PARTIAL SEIZURES, NOT INTRACTABLE, WITHOUT STATUS EPILEPTICUS (HCC): ICD-10-CM

## 2020-01-30 DIAGNOSIS — G62.9 POLYNEUROPATHY: ICD-10-CM

## 2020-01-30 DIAGNOSIS — F98.8 ATTENTION DEFICIT DISORDER (ADD) WITHOUT HYPERACTIVITY: ICD-10-CM

## 2020-01-30 DIAGNOSIS — G62.9 POLYNEUROPATHY: Primary | ICD-10-CM

## 2020-01-30 DIAGNOSIS — G56.03 BILATERAL CARPAL TUNNEL SYNDROME: ICD-10-CM

## 2020-01-30 DIAGNOSIS — F41.1 GAD (GENERALIZED ANXIETY DISORDER): ICD-10-CM

## 2020-01-30 DIAGNOSIS — K59.00 CONSTIPATION, UNSPECIFIED CONSTIPATION TYPE: ICD-10-CM

## 2020-01-30 DIAGNOSIS — R56.9 SEIZURE (HCC): ICD-10-CM

## 2020-01-30 DIAGNOSIS — I67.82 CEREBRAL ISCHEMIA: ICD-10-CM

## 2020-01-30 RX ORDER — CYCLOBENZAPRINE HCL 10 MG
TABLET ORAL
Qty: 90 TAB | Refills: 0 | Status: SHIPPED | OUTPATIENT
Start: 2020-01-30 | End: 2020-08-03

## 2020-01-30 RX ORDER — METHYLPHENIDATE HYDROCHLORIDE 10 MG/1
10 CAPSULE, EXTENDED RELEASE ORAL
Qty: 30 CAP | Refills: 0 | Status: SHIPPED | OUTPATIENT
Start: 2020-01-30

## 2020-01-30 RX ORDER — LORAZEPAM 2 MG/1
TABLET ORAL
Qty: 2 TAB | Refills: 0 | Status: ON HOLD | OUTPATIENT
Start: 2020-01-30 | End: 2020-05-15

## 2020-01-30 RX ORDER — DIVALPROEX SODIUM 500 MG/1
500 TABLET, EXTENDED RELEASE ORAL
Qty: 30 TAB | Refills: 3 | Status: SHIPPED | OUTPATIENT
Start: 2020-01-30

## 2020-01-31 LAB
ACE SERPL-CCNC: 43 U/L (ref 14–82)
ANA SER QL: NEGATIVE
CK SERPL-CCNC: 140 U/L (ref 24–204)
CRP SERPL HS-MCNC: 6.67 MG/L (ref 0–3)
ERYTHROCYTE [SEDIMENTATION RATE] IN BLOOD BY WESTERGREN METHOD: 36 MM/HR (ref 0–30)
HCYS SERPL-SCNC: 25.5 UMOL/L (ref 0–17.2)
RHEUMATOID FACT SERPL-ACNC: <10 IU/ML (ref 0–13.9)
VIT B12 SERPL-MCNC: 863 PG/ML (ref 232–1245)

## 2020-02-01 NOTE — PROGRESS NOTES
Neurology Progress Note    NAME:  Alton Hunt   :   1955   MRN:   [id number]     Date/Time:  2020  Subjective:      Alton Hunt is a 75 y.o. male here today for follow-up for dysarthria, confusion and dizziness, test results  Patient says he continues to experience the speech difficulty, according to patient, he notices that anytime he does strenuous thing and feels tired, the slowness of speech and confusion comes in. 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 He says most of the time when he gets up from sitting or standing position he feels very dizzy as if he is going to fall out. He also says he continues to have numbness and tingling sensation of the extremities. EEG reviewed with patient showed occasional sharp slowing which is consistent with epileptogenic focus, I told patient that he may be having seizures. I will start patient on antiepileptic medication, I will choose Depakote because this does not affect the kidney with patient being on dialysis. EMG/nerve conduction studies showed severe polyneuropathy with carpal tunnel syndrome, there is evidence of demyelination pointing towards CIDP. MRI of the brain and blood work were not done. I will order MRI with antianxiety medication, and also patient blood work is reordered. Patient was given Linzess because of irritable bowel syndrome with constipation.   Review of Systems - General ROS: positive for  - fatigue, night sweats and sleep disturbance  Psychological ROS: positive for - anxiety, concentration difficulties, memory difficulties and sleep disturbances  Ophthalmic ROS: positive for - blurry vision, decreased vision and double vision  ENT ROS: positive for - headaches, tinnitus, vertigo and visual changes  Allergy and Immunology ROS: negative  Hematological and Lymphatic ROS: negative  Endocrine ROS: negative  Respiratory ROS: no cough, shortness of breath, or wheezing  Cardiovascular ROS: no chest pain or dyspnea on exertion  Gastrointestinal ROS: no abdominal pain, change in bowel habits, or black or bloody stools  Genito-Urinary ROS: no dysuria, trouble voiding, or hematuria  Musculoskeletal ROS: positive for - gait disturbance, joint pain, joint stiffness, joint swelling, muscle pain and muscular weakness  Neurological ROS: positive for - confusion, dizziness, gait disturbance, headaches, impaired coordination/balance, memory loss, numbness/tingling, speech problems, tremors, visual changes and weakness  Dermatological ROS: negative        Medications reviewed:  Current Outpatient Medications   Medication Sig Dispense Refill    cyclobenzaprine (FLEXERIL) 10 mg tablet TAKE ONE TABLET BY MOUTH THREE TIMES DAILY AS NEEDED FOR MUSCLE SPASM 90 Tab 0    linaCLOtide (LINZESS) 72 mcg cap capsule Take 2 Caps by mouth daily. 30 Cap 1    divalproex ER (DEPAKOTE ER) 500 mg ER tablet Take 1 Tab by mouth nightly. 30 Tab 3    methylphenidate HCl (METADATE CD) 10 mg ER capsule Take 1 Cap by mouth every morning. Max Daily Amount: 10 mg. 30 Cap 0    LORazepam (ATIVAN) 2 mg tablet Take 1 tab 30- 1hr before MRI procedure 2 Tab 0    b complex-vitamin c-folic acid (RENAL CAPS) 1 mg capsule Take 1 Cap by mouth.  midodrine (PROAMITINE) 5 mg tablet Take  by mouth three (3) times daily.  bisacodyl (DULCOLAX, BISACODYL,) 5 mg EC tablet Take 5 mg by mouth daily as needed for Constipation.  aspirin delayed-release 81 mg tablet Take 81 mg by mouth daily.  acetaminophen (TYLENOL) 325 mg tablet Take 2 Tabs by mouth every six (6) hours.  Indications: PAIN 60 Tab 0    albuterol (PROVENTIL HFA, VENTOLIN HFA, PROAIR HFA) 90 mcg/actuation inhaler Take 2 Puffs by inhalation every four (4) hours as needed for Wheezing. 1 Inhaler 12    clopidogrel (PLAVIX) 75 mg tab Take 1 Tab by mouth daily. 30 Tab 1    SYMBICORT 160-4.5 mcg/actuation HFAA Take 2 Puffs by inhalation two (2) times a day. 1 Inhaler 12    guaiFENesin ER (MUCINEX) 600 mg ER tablet Take 600 mg by mouth every twelve (12) hours.  fluticasone propion-salmeterol (ADVAIR DISKUS) 250-50 mcg/dose diskus inhaler Take 1 Puff by inhalation every twelve (12) hours. Not taking because of cost; never filled prescription      carvedilol (COREG) 6.25 mg tablet Take 12.5 mg by mouth every other day. 60 Tab 12    sildenafil citrate (VIAGRA) 100 mg tablet Take 1 Tab by mouth as needed. 8 Tab 12    losartan (COZAAR) 25 mg tablet Take  by mouth every other day.  calcium acetate (PHOSLO) 667 mg cap Take 3 Caps by mouth three (3) times daily (with meals).  Patient claims to take 1cap TID          Objective:   Vitals:  Vitals:    01/30/20 1002   BP: 99/67   Pulse: (!) 103   Resp: 18   Temp: 98 °F (36.7 °C)   TempSrc: Temporal   SpO2: 98%   Weight: 178 lb (80.7 kg)   Height: 5' 7\" (1.702 m)   PainSc:   0 - No pain       Lab Data Reviewed:  Lab Results   Component Value Date/Time    WBC 8.1 05/03/2019 02:20 AM    HCT 32.6 (L) 05/03/2019 02:20 AM    HGB 10.0 (L) 05/03/2019 02:20 AM    PLATELET 311 88/99/2222 02:20 AM       Lab Results   Component Value Date/Time    Sodium 136 05/03/2019 02:20 AM    Potassium 3.2 (L) 05/03/2019 02:20 AM    Chloride 97 05/03/2019 02:20 AM    CO2 27 05/03/2019 02:20 AM    Glucose 99 05/03/2019 02:20 AM    BUN 58 (H) 05/03/2019 02:20 AM    Creatinine 8.99 (H) 05/03/2019 02:20 AM    Calcium 9.9 05/03/2019 02:20 AM       No components found for: TROPQUANT    No results found for: HALIE      Lab Results   Component Value Date/Time    Hemoglobin A1c 4.8 09/24/2015 11:59 AM    Hemoglobin A1c (POC) 4.5 12/24/2012 08:41 AM        Lab Results   Component Value Date/Time    Vitamin B12 863 01/30/2020 11:28 AM    Folate 17.8 11/13/2018 12:00 AM No results found for: Neena AMBROSE Pih, XBANA    Lab Results   Component Value Date/Time    Cholesterol (POC) 242 10/23/2018 04:18 PM    HDL Cholesterol (POC) 45 10/23/2018 04:18 PM    LDL Cholesterol (POC) 162 10/23/2018 04:18 PM    Triglycerides (POC) 177 10/23/2018 04:18 PM         CT [facility]):  [facility]   [facility] CHEST WO CONT    Narrative EXAM:  CT CHEST WO CONT    INDICATION:  Interstitial lung disease. Essential hypertension. COMPARISON: Chest radiographs 11/20/2018    TECHNIQUE: Helical CT of the chest is performed without intravenous contrast.  Coronal and sagittal reformatted images are obtained. Helical chest CT is  performed with supine inspiration, supine expiration, and prone inspiration per  the high-resolution chest CT protocol. CT dose reduction was achieved through  use of a standardized protocol tailored for this examination and automatic  exposure control for dose modulation. Adaptive statistical iterative  reconstruction (ASIR) was utilized. FINDINGS:   The visualized thyroid gland is unremarkable. There is mild dilatation of the  ascending aorta measuring 3.5 cm in diameter. The main pulmonary artery is  dilated measuring 3.7 cm in diameter in keeping with pulmonary artery  hypertension. There is coronary artery atherosclerosis. There is moderate to  marked cardiomegaly. There is no pericardial effusion. There is a mildly enlarged left axillary lymph node measuring 1.5 x 1.5 cm  (series 5, image 21). Otherwise, there are no enlarged axillary, mediastinal, or  hilar lymph nodes. There are multiple scattered bilateral subcentimeter nodules that are  predominantly groundglass or part solid. There is no lung mass or consolidation. There is no pleural effusion or pneumothorax. The central airways are clear. There is no subpleural reticular opacity, honeycombing, or bronchiectasis.   Expiratory images demonstrate no evidence for air trapping. There are several liver cysts and additional subcentimeter low-density liver  lesions that are too small to characterize but likely represent cysts. Surgical  clips are noted in the left upper quadrant. There is no acute abnormality in the  visualized upper abdomen. There is no aggressive bony lesion. Impression IMPRESSION:   1. Multiple scattered bilateral subcentimeter lung nodules that are  predominantly groundglass or part solid are nonspecific. Differential  considerations include but are not limited to nonspecific infection or  inflammation. Follow-up routine chest CT without contrast is recommended in 3  months. 2. No evidence for interstitial lung disease or pulmonary fibrosis. 3. Cardiomegaly. Mild dilatation of the ascending aorta measuring 3.5 cm in  diameter. Dilatation of the main pulmonary artery suggesting pulmonary artery  hypertension. 4. A mildly enlarged left axillary lymph node is nonspecific. Attention on  follow-up is suggested. 23X             MRI Results (recent):  Results from [place] encounter on 12/12/13   MRI BRAIN WO CONT    Narrative **Final Report**       ICD Codes / Adm. Diagnosis: 780.93   / Memory loss    Examination:  MR BRAIN WO CON  - [id number] - Dec 12 2013  4:27PM  Accession No:  [id number]  Reason:  short-term memory loss      REPORT:  INDICATION: Problem with memory. Short-term memory loss. Patient on dialysis   3 days a week  TECHNIQUE: Sagittal T1, FLAIR and T2-weighted images were acquired. The   patient had concern about stain in the magnet with \"unable to breathe\" and   seemed claustrophobic. Patient was going to contact his physician about   getting some premedication. If this can be achieved before the end of the   ear the remaining imaging sequences can be performed at no additional charge   and an addendum will be made. COMPARISON: None available.   FINDINGS:  The ventricular size and sulcal size are within the upper limits of normal   for age. Numerous foci of T2 hyperintensity in the cerebral white matter with   confluence in the periventricular distribution has a pattern most consistent   with chronic small vessel type ischemic changes. No evidence of hemorrhage, mass, acute infarction or abnormal extra-axial   fluid collections. The distal carotid and vertebral basilar systems are tortuous which is   probably related to chronic hypertension. Otherwise normal appearing   flow-voids in vertebral basilar and carotid artery systems. There is hypointensity throughout the marrow which is likely related to   chronic dialysis. The craniocervical junction is otherwise unremarkable. IMPRESSION:  1. Chronic small vessel ischemic white matter disease. 2. No acute intracranial abnormality demonstrated. The remaining standard study sequences of axial T1, gradient echo T2 and   diffusion sequences can be acquired if the patient can receive p.o.   premedication for the exam and return before the end of the year. If this is   achieved an addendum will be given after the additional imaging. Signing/Reading Doctor: Leora Gonzalez ([id number])    Approved: Leora Gonzalez ([id number])  Dec 12 2013  5:39PM                                   IR Results (recent):  No results found for this or any previous visit. VAS/US Results (recent):  Results from Hospital Encounter encounter on 10/20/14   DUPLEX LOWER EXT VENOUS BILAT       PHYSICAL EXAM:  General:    Alert, cooperative, no distress, appears stated age. Head:   Normocephalic, without obvious abnormality, atraumatic. Eyes:   Conjunctivae/corneas clear. PERRLA  Nose:  Nares normal. No drainage or sinus tenderness. Throat:    Lips, mucosa, and tongue normal.  No Thrush  Neck:  Supple, symmetrical,  no adenopathy, thyroid: non tender    no carotid bruit and no JVD. Back:    Symmetric,  No CVA tenderness. Lungs:   Clear to auscultation bilaterally.   No Wheezing or Rhonchi. No rales. Chest wall:  No tenderness or deformity. No Accessory muscle use. Heart:   Regular rate and rhythm,  no murmur, rub or gallop. Abdomen:   Soft, non-tender. Not distended. Bowel sounds normal. No masses  Extremities: Extremities normal, atraumatic, No cyanosis. No edema. No clubbing  Skin:     Texture, turgor normal. No rashes or lesions. Not Jaundiced  Lymph nodes: Cervical, supraclavicular normal.  Psych:  Good insight. Not depressed. Anxious. NEUROLOGICAL EXAM:  Appearance: The patient is well developed, well nourished, provides a coherent history and is in no acute distress. Mental Status: Oriented to time, place and person. Mood and affect appropriate. Cranial Nerves:   Intact visual fields. Fundi are benign. ILIANA, EOM's full, no nystagmus, no ptosis. Facial sensation is normal. Corneal reflexes are intact. Facial movement is symmetric. Hearing is normal bilaterally. Palate is midline with normal sternocleidomastoid and trapezius muscles are normal. Tongue is midline. Motor:  5-/5 strength in upper and lower proximal and distal muscles. Normal bulk and tone. No fasciculations. Reflexes:   Deep tendon reflexes 2+/4 and symmetrical.   Sensory:    Dysesthesia to touch, pinprick and vibration. Gait:   Slightly unsteady gait. Tremor:    Mild tremor noted. Cerebellar:  No cerebellar signs present. Neurovascular:  Normal heart sounds and regular rhythm, peripheral pulses intact, and no carotid bruits. Assesment  1. Polyneuropathy    - VITAMIN B12  - SED RATE (ESR); Future  - RHEUMATOID FACTOR, QL  - HALIE, DIRECT, W/REFLEX  - ANGIOTENSIN CONVERTING ENZYME  - CK  - HOMOCYSTEINE, PLASMA    2. Bilateral carpal tunnel syndrome  Referred to hand surgeon    3.  Seizure ([facility] [address]    - MRI BRAIN WO CONT; Future  - CK  - CRP, HIGH SENSITIVITY    4. Partial symptomatic epilepsy with complex partial seizures, not intractable, without status epilepticus (HCC)    - VITAMIN B12  - SED RATE (ESR); Future  - RHEUMATOID FACTOR, QL  - HALIE, DIRECT, W/REFLEX  - ANGIOTENSIN CONVERTING ENZYME  - CK    5. Cerebral ischemia    - SED RATE (ESR); Future  - RHEUMATOID FACTOR, QL  - HALIE, DIRECT, W/REFLEX  - ANGIOTENSIN CONVERTING ENZYME  - CK  - HOMOCYSTEINE, PLASMA  - CRP, HIGH SENSITIVITY    6. Constipation, unspecified constipation type  Linzess    7. Attention deficit disorder (ADD) without hyperactivity    - methylphenidate HCl (METADATE CD) 10 mg ER capsule; Take 1 Cap by mouth every morning. Max Daily Amount: 10 mg. Dispense: 30 Cap; Refill: 0    8. ORTEGA (generalized anxiety disorder)    - LORazepam (ATIVAN) 2 mg tablet; Take 1 tab 30- 1hr before MRI procedure  Dispense: 2 Tab; Refill: 0    ___________________________________________________  PLAN: Medication and plan discussed with patient      ICD-10-CM ICD-9-CM    1. Polyneuropathy G62.9 356.9 VITAMIN B12      SED RATE (ESR)      RHEUMATOID FACTOR, QL      HALIE, DIRECT, W/REFLEX      ANGIOTENSIN CONVERTING ENZYME      CK      HOMOCYSTEINE, PLASMA   2. Bilateral carpal tunnel syndrome G56.03 354.0    3. Seizure ([address] R56.9 780.39 MRI BRAIN WO CONT      CK      CRP, HIGH SENSITIVITY   4. Partial symptomatic epilepsy with complex partial seizures, not intractable, without status epilepticus (HCC) G40.209 345.40 VITAMIN B12      SED RATE (ESR)      RHEUMATOID FACTOR, QL      HALIE, DIRECT, W/REFLEX      ANGIOTENSIN CONVERTING ENZYME      CK   5. Cerebral ischemia I67.82 437.1 SED RATE (ESR)      RHEUMATOID FACTOR, QL      HALIE, DIRECT, W/REFLEX      ANGIOTENSIN CONVERTING ENZYME      CK      HOMOCYSTEINE, PLASMA      CRP, HIGH SENSITIVITY   6. Constipation, unspecified constipation type K59.00 564.00    7. Attention deficit disorder (ADD) without hyperactivity F98.8 314.00 methylphenidate HCl (METADATE CD) 10 mg ER capsule   8.  ORTEGA (generalized anxiety disorder) F41.1 300.02 LORazepam (ATIVAN) 2 mg tablet     Follow-up and Dispositions    · Return in about 2 months (around 3/30/2020).              ___________________________________________________    Attending Physician: Jeni Barber MD

## 2020-04-01 ENCOUNTER — TELEPHONE (OUTPATIENT)
Dept: NEUROLOGY | Age: 65
End: 2020-04-01

## 2020-04-01 NOTE — TELEPHONE ENCOUNTER
Prior Authorization submitted for Methylphenidate to [facility] Part D via Cover My Meds. Status Pending. Allow 24-72 hours for response.      CM Key: [id number]

## 2020-04-01 NOTE — TELEPHONE ENCOUNTER
Prior Authorization submitted [facility] Part D via Cover My Meds. Status Pending. Allow 24-72 hours for response.      CMSINCERE Key: Ekta Cuevas

## 2020-04-02 ENCOUNTER — VIRTUAL VISIT (OUTPATIENT)
Dept: NEUROLOGY | Age: 65
End: 2020-04-02

## 2020-04-02 DIAGNOSIS — G62.9 POLYNEUROPATHY: ICD-10-CM

## 2020-04-02 DIAGNOSIS — F98.8 ATTENTION DEFICIT DISORDER (ADD) WITHOUT HYPERACTIVITY: ICD-10-CM

## 2020-04-02 DIAGNOSIS — I67.82 CEREBRAL ISCHEMIA: ICD-10-CM

## 2020-04-02 DIAGNOSIS — G56.03 BILATERAL CARPAL TUNNEL SYNDROME: ICD-10-CM

## 2020-04-02 DIAGNOSIS — G40.209 PARTIAL SYMPTOMATIC EPILEPSY WITH COMPLEX PARTIAL SEIZURES, NOT INTRACTABLE, WITHOUT STATUS EPILEPTICUS (HCC): Primary | ICD-10-CM

## 2020-04-02 DIAGNOSIS — F41.1 GAD (GENERALIZED ANXIETY DISORDER): ICD-10-CM

## 2020-04-02 RX ORDER — FOLIC ACID 1 MG/1
1 TABLET ORAL DAILY
Qty: 30 TAB | Refills: 3 | Status: SHIPPED | OUTPATIENT
Start: 2020-04-02

## 2020-04-02 NOTE — PROGRESS NOTES
Neurology Progress Note   Sim Sinha was seen by synchronous (real-time) audio-video technology on 20. Consent:  He and/or his healthcare decision maker is aware that this patient-initiated Telehealth encounter is a billable service, with coverage as determined by his insurance carrier. He is aware that he may receive a bill and has provided verbal consent to proceed: Yes    I was in the office while conducting this encounter. NAME:  Sim Sinha   :   1955   MRN:   [id number]     Date/Time:  2020  Subjective:     Sim Sinha is a 75 y.o. male here today for follow-up for seizure, dementia and dizziness, neuropathy, test results. Patient says hehas been doing little better, since he is not involved in very strenuous work, his confusion is much less and has not been having slurring speech. He says most of the time when he gets up from sitting or standing position he still feels very dizzy as if he is going to fall out. He also says he continues to have numbness and tingling sensation of the extremities. Blood work reviewed with patient still elevated CRP, ESR, homocystine. This is evidence of inflammation and propensity to stroke and cardiac problem. I will give patient folic acid 1 mg p.o. daily. MRI of the brain was not done due to pain and claustrophobia. Patient to continue his rest of medication.   Review of Systems - General ROS: positive for  - fatigue, night sweats and sleep disturbance  Psychological ROS: positive for - anxiety, concentration difficulties, memory difficulties and sleep disturbances  Ophthalmic ROS: positive for - blurry vision, decreased vision and double vision  ENT ROS: positive for - headaches, tinnitus, vertigo and visual changes  Allergy and Immunology ROS: negative  Hematological and Lymphatic ROS: negative  Endocrine ROS: negative  Respiratory ROS: no cough, shortness of breath, or wheezing  Cardiovascular ROS: no chest pain or dyspnea on exertion  Gastrointestinal ROS: no abdominal pain, change in bowel habits, or black or bloody stools  Genito-Urinary ROS: no dysuria, trouble voiding, or hematuria  Musculoskeletal ROS: positive for - gait disturbance, joint pain, joint stiffness, joint swelling, muscle pain and muscular weakness  Neurological ROS: positive for - confusion, dizziness, gait disturbance, headaches, impaired coordination/balance, memory loss, numbness/tingling, speech problems, tremors, visual changes and weakness  Dermatological ROS: negative       Medications reviewed:  Current Outpatient Medications   Medication Sig Dispense Refill    cyclobenzaprine (FLEXERIL) 10 mg tablet TAKE ONE TABLET BY MOUTH THREE TIMES DAILY AS NEEDED FOR MUSCLE SPASM 90 Tab 0    linaCLOtide (LINZESS) 72 mcg cap capsule Take 2 Caps by mouth daily. 30 Cap 1    divalproex ER (DEPAKOTE ER) 500 mg ER tablet Take 1 Tab by mouth nightly. 30 Tab 3    methylphenidate HCl (METADATE CD) 10 mg ER capsule Take 1 Cap by mouth every morning. Max Daily Amount: 10 mg. 30 Cap 0    b complex-vitamin c-folic acid (RENAL CAPS) 1 mg capsule Take 1 Cap by mouth.  midodrine (PROAMITINE) 5 mg tablet Take  by mouth three (3) times daily.  bisacodyl (DULCOLAX, BISACODYL,) 5 mg EC tablet Take 5 mg by mouth daily as needed for Constipation.  calcium acetate (PHOSLO) 667 mg cap Take 3 Caps by mouth three (3) times daily (with meals). Patient claims to take 1cap TID      aspirin delayed-release 81 mg tablet Take 81 mg by mouth daily.  acetaminophen (TYLENOL) 325 mg tablet Take 2 Tabs by mouth every six (6) hours. Indications: PAIN 60 Tab 0    albuterol (PROVENTIL HFA, VENTOLIN HFA, PROAIR HFA) 90 mcg/actuation inhaler Take 2 Puffs by inhalation every four (4) hours as needed for Wheezing. 1 Inhaler 12    LORazepam (ATIVAN) 2 mg tablet Take 1 tab 30- 1hr before MRI procedure 2 Tab 0    clopidogrel (PLAVIX) 75 mg tab Take 1 Tab by mouth daily.  30 Tab 1    SYMBICORT 160-4.5 mcg/actuation HFAA Take 2 Puffs by inhalation two (2) times a day. 1 Inhaler 12    guaiFENesin ER (MUCINEX) 600 mg ER tablet Take 600 mg by mouth every twelve (12) hours.  fluticasone propion-salmeterol (ADVAIR DISKUS) 250-50 mcg/dose diskus inhaler Take 1 Puff by inhalation every twelve (12) hours. Not taking because of cost; never filled prescription      carvedilol (COREG) 6.25 mg tablet Take 12.5 mg by mouth every other day. 60 Tab 12    sildenafil citrate (VIAGRA) 100 mg tablet Take 1 Tab by mouth as needed. 8 Tab 12    losartan (COZAAR) 25 mg tablet Take  by mouth every other day.           Objective:   Vitals:  Vitals:    04/02/20 1107   PainSc:   0 - No pain               Lab Data Reviewed:  Lab Results   Component Value Date/Time    WBC 8.1 05/03/2019 02:20 AM    HCT 32.6 (L) 05/03/2019 02:20 AM    HGB 10.0 (L) 05/03/2019 02:20 AM    PLATELET 784 76/39/4446 02:20 AM       Lab Results   Component Value Date/Time    Sodium 136 05/03/2019 02:20 AM    Potassium 3.2 (L) 05/03/2019 02:20 AM    Chloride 97 05/03/2019 02:20 AM    CO2 27 05/03/2019 02:20 AM    Glucose 99 05/03/2019 02:20 AM    BUN 58 (H) 05/03/2019 02:20 AM    Creatinine 8.99 (H) 05/03/2019 02:20 AM    Calcium 9.9 05/03/2019 02:20 AM       No components found for: TROPQUANT    No results found for: HALIE      Lab Results   Component Value Date/Time    Hemoglobin A1c 4.8 09/24/2015 11:59 AM    Hemoglobin A1c (POC) 4.5 12/24/2012 08:41 AM        Lab Results   Component Value Date/Time    Vitamin B12 863 01/30/2020 11:28 AM    Folate 17.8 11/13/2018 12:00 AM       No results found for: Merlin AMBROSE XBANA    Lab Results   Component Value Date/Time    Cholesterol (POC) 242 10/23/2018 04:18 PM    HDL Cholesterol (POC) 45 10/23/2018 04:18 PM    LDL Cholesterol (POC) 162 10/23/2018 04:18 PM    Triglycerides (POC) 177 10/23/2018 04:18 PM         CT Results (recent):  Results from Hospital Encounter encounter on 03/26/19   CT CHEST WO CONT    Narrative EXAM:  CT CHEST WO CONT    INDICATION:  Interstitial lung disease. Essential hypertension. COMPARISON: Chest radiographs 11/20/2018    TECHNIQUE: Helical CT of the chest is performed without intravenous contrast.  Coronal and sagittal reformatted images are obtained. Helical chest CT is  performed with supine inspiration, supine expiration, and prone inspiration per  the high-resolution chest CT protocol. CT dose reduction was achieved through  use of a standardized protocol tailored for this examination and automatic  exposure control for dose modulation. Adaptive statistical iterative  reconstruction (ASIR) was utilized. FINDINGS:   The visualized thyroid gland is unremarkable. There is mild dilatation of the  ascending aorta measuring 3.5 cm in diameter. The main pulmonary artery is  dilated measuring 3.7 cm in diameter in keeping with pulmonary artery  hypertension. There is coronary artery atherosclerosis. There is moderate to  marked cardiomegaly. There is no pericardial effusion. There is a mildly enlarged left axillary lymph node measuring 1.5 x 1.5 cm  (series 5, image 21). Otherwise, there are no enlarged axillary, mediastinal, or  hilar lymph nodes. There are multiple scattered bilateral subcentimeter nodules that are  predominantly groundglass or part solid. There is no lung mass or consolidation. There is no pleural effusion or pneumothorax. The central airways are clear. There is no subpleural reticular opacity, honeycombing, or bronchiectasis. Expiratory images demonstrate no evidence for air trapping. There are several liver cysts and additional subcentimeter low-density liver  lesions that are too small to characterize but likely represent cysts. Surgical  clips are noted in the left upper quadrant. There is no acute abnormality in the  visualized upper abdomen. There is no aggressive bony lesion.       Impression IMPRESSION:   1. Multiple scattered bilateral subcentimeter lung nodules that are  predominantly groundglass or part solid are nonspecific. Differential  considerations include but are not limited to nonspecific infection or  inflammation. Follow-up routine chest CT without contrast is recommended in 3  months. 2. No evidence for interstitial lung disease or pulmonary fibrosis. 3. Cardiomegaly. Mild dilatation of the ascending aorta measuring 3.5 cm in  diameter. Dilatation of the main pulmonary artery suggesting pulmonary artery  hypertension. 4. A mildly enlarged left axillary lymph node is nonspecific. Attention on  follow-up is suggested. 23X             MRI Results (recent):  Results from [place] encounter on 12/12/13   MRI BRAIN WO CONT    Narrative **Final Report**       ICD Codes / Adm. Diagnosis: 780.93   / Memory loss    Examination:  MR BRAIN WO CON  - [id number] - Dec 12 2013  4:27PM  Accession No:  [id number]  Reason:  short-term memory loss      REPORT:  INDICATION: Problem with memory. Short-term memory loss. Patient on dialysis   3 days a week  TECHNIQUE: Sagittal T1, FLAIR and T2-weighted images were acquired. The   patient had concern about stain in the magnet with \"unable to breathe\" and   seemed claustrophobic. Patient was going to contact his physician about   getting some premedication. If this can be achieved before the end of the   ear the remaining imaging sequences can be performed at no additional charge   and an addendum will be made. COMPARISON: None available. FINDINGS:  The ventricular size and sulcal size are within the upper limits of normal   for age. Numerous foci of T2 hyperintensity in the cerebral white matter with   confluence in the periventricular distribution has a pattern most consistent   with chronic small vessel type ischemic changes. No evidence of hemorrhage, mass, acute infarction or abnormal extra-axial   fluid collections.   The distal carotid and vertebral basilar systems are tortuous which is   probably related to chronic hypertension. Otherwise normal appearing   flow-voids in vertebral basilar and carotid artery systems. There is hypointensity throughout the marrow which is likely related to   chronic dialysis. The craniocervical junction is otherwise unremarkable. IMPRESSION:  1. Chronic small vessel ischemic white matter disease. 2. No acute intracranial abnormality demonstrated. The remaining standard study sequences of axial T1, gradient echo T2 and   diffusion sequences can be acquired if the patient can receive p.o.   premedication for the exam and return before the end of the year. If this is   achieved an addendum will be given after the additional imaging. Signing/Reading Doctor: Andriy Suero ([id number])    Approved: Andriy Suero ([id number])  Dec 12 2013  5:39PM                                   IR Results (recent):  No results found for this or any previous visit. VAS/US Results (recent):  Results from Hospital Encounter encounter on 10/20/14   DUPLEX LOWER EXT VENOUS BILAT       PHYSICAL EXAM:  General:    Alert, cooperative, no distress, appears stated age. Head:   Normocephalic, without obvious abnormality, atraumatic. Eyes:   Clear conjunctivae/corneas   Nose:  Nares normal.   Throat:    Lips and tongue normal.    Neck:  Symmetrical,  no adenopathy, thyroid observed     no JVD. Back:    Symmetric. Lungs:   Deferred  Chest wall:   No Accessory muscle use. Heart:   Deferred. Abdomen:   Soft, non-tender. Not distended. Bowel sounds normal. No masses  Extremities: Extremities normal, atraumatic, No cyanosis. No edema. No clubbing  Skin:     Texture, turgor normal. No rashes or lesions. Not Jaundiced  Lymph nodes: Cervical, supraclavicular normal.  Psych:  Good insight. Not depressed. Not anxious or agitated. NEUROLOGICAL EXAM:  Appearance:   The patient is well developed, well nourished, provides a coherent history and is in no acute distress. Mental Status: Oriented to time, place and person. Mood and affect appropriate. Cranial Nerves:   Intact visual fields. Fundi are benign. ILIANA, EOM's full, no nystagmus, no ptosis. .Tongue is midline. Motor:   Moves all extremity. . No fasciculations. Reflexes:   Deferred. Sensory:   Deferred   Gait:  Deferred. Tremor:   No tremor noted. Assesment  1. Partial symptomatic epilepsy with complex partial seizures, not intractable, without status epilepticus ([facility] [address]  Continue management    2. Polyneuropathy  Continue management    3. Bilateral carpal tunnel syndrome  Carpal tunnel release pending    4. Cerebral ischemia  Stable    5. Attention deficit disorder (ADD) without hyperactivity  Continue management    6. ORTEGA (generalized anxiety disorder)  Continue management    ___________________________________________________  PLAN: Medication and plan discussed with patient      ICD-10-CM ICD-9-CM    1. Partial symptomatic epilepsy with complex partial seizures, not intractable, without status epilepticus ([facility] [address] G40.209 345.40    2. Polyneuropathy G62.9 356.9    3. Bilateral carpal tunnel syndrome G56.03 354.0    4. Cerebral ischemia I67.82 437.1    5. Attention deficit disorder (ADD) without hyperactivity F98.8 314.00    6. ORTEGA (generalized anxiety disorder) F41.1 300.02      Follow-up and Dispositions    · Return in about 3 months (around 7/2/2020). Pursuant to the emergency declaration under the [facility], [facility] waiver authority and the [employer] Act, this Virtual  Visit was conducted, with patient's consent, to reduce the patient's risk of exposure to COVID-19 and provide continuity of care for an established patient.      Services were provided through a video synchronous discussion virtually to substitute for in-person clinic visit.    ___________________________________________________    Attending Physician: Mily Campos MD

## 2020-04-03 ENCOUNTER — TELEPHONE (OUTPATIENT)
Dept: NEUROLOGY | Age: 65
End: 2020-04-03

## 2020-04-03 NOTE — TELEPHONE ENCOUNTER
Prior Authorization DENIED for Linzess 72mcg and Methylphenidate ER 10mg by [employer]. Denial reason states:     -Patient's Plan covers Linzess regardless of dosage at 30 caps/30 days, but request was for 60 caps, 30 days. Rejected for surpassing QTY limit for med. -Methylphenidate ER is not on formulary. Patient must have a trial/failure of methylphenidate TABLET before insurance will cover CAPSULE. Provider can adjust dosage for Linzess to meet QTY limit or appeal decision. Provider can prescribe formulary alternative (methylphenidate tab), or appeal the decision. Denials scanned into media for review.

## 2020-05-15 ENCOUNTER — ANESTHESIA (OUTPATIENT)
Dept: SURGERY | Age: 65
End: 2020-05-15
Payer: MEDICARE

## 2020-05-15 ENCOUNTER — HOSPITAL ENCOUNTER (OUTPATIENT)
Age: 65
Setting detail: OUTPATIENT SURGERY
Discharge: HOME | End: 2020-05-15
Payer: MEDICARE

## 2020-05-15 ENCOUNTER — ANESTHESIA EVENT (OUTPATIENT)
Dept: SURGERY | Age: 65
End: 2020-05-15
Payer: MEDICARE

## 2020-05-15 VITALS
SYSTOLIC BLOOD PRESSURE: 134 MMHG | RESPIRATION RATE: 22 BRPM | DIASTOLIC BLOOD PRESSURE: 63 MMHG | BODY MASS INDEX: 27.62 KG/M2 | HEART RATE: 80 BPM | WEIGHT: 176.37 LBS | TEMPERATURE: 97.5 F | OXYGEN SATURATION: 99 %

## 2020-05-15 DIAGNOSIS — T82.510A PSEUDOANEURYSM OF ARTERIOVENOUS DIALYSIS FISTULA, INITIAL ENCOUNTER (HCC): Primary | ICD-10-CM

## 2020-05-15 LAB
ANION GAP SERPL CALC-SCNC: 4 MMOL/L (ref 5–15)
BUN SERPL-MCNC: 16 MG/DL (ref 6–20)
BUN/CREAT SERPL: 3 (ref 12–20)
CALCIUM SERPL-MCNC: 9.3 MG/DL (ref 8.5–10.1)
CHLORIDE SERPL-SCNC: 95 MMOL/L (ref 97–108)
CO2 SERPL-SCNC: 34 MMOL/L (ref 21–32)
CREAT SERPL-MCNC: 5.19 MG/DL (ref 0.7–1.3)
GLUCOSE SERPL-MCNC: 84 MG/DL (ref 65–100)
POTASSIUM SERPL-SCNC: 3.2 MMOL/L (ref 3.5–5.1)
SODIUM SERPL-SCNC: 133 MMOL/L (ref 136–145)

## 2020-05-15 PROCEDURE — 76010000149 HC OR TIME 1 TO 1.5 HR

## 2020-05-15 PROCEDURE — 80048 BASIC METABOLIC PNL TOTAL CA: CPT

## 2020-05-15 PROCEDURE — 76210000017 HC OR PH I REC 1.5 TO 2 HR

## 2020-05-15 PROCEDURE — 76210000026 HC REC RM PH II 1 TO 1.5 HR

## 2020-05-15 PROCEDURE — 77030011640 HC PAD GRND REM COVD -A

## 2020-05-15 PROCEDURE — 76060000033 HC ANESTHESIA 1 TO 1.5 HR

## 2020-05-15 PROCEDURE — 74011000250 HC RX REV CODE- 250: Performed by: NURSE ANESTHETIST, CERTIFIED REGISTERED

## 2020-05-15 PROCEDURE — C1768 GRAFT, VASCULAR: HCPCS

## 2020-05-15 PROCEDURE — 77030002924 HC SUT GORTX WLGO -B

## 2020-05-15 PROCEDURE — 77030040361 HC SLV COMPR DVT MDII -B

## 2020-05-15 PROCEDURE — 77030018846 HC SOL IRR STRL H20 ICUM -A

## 2020-05-15 PROCEDURE — 74011000250 HC RX REV CODE- 250

## 2020-05-15 PROCEDURE — 77030031139 HC SUT VCRL2 J&J -A

## 2020-05-15 PROCEDURE — 74011250636 HC RX REV CODE- 250/636: Performed by: NURSE ANESTHETIST, CERTIFIED REGISTERED

## 2020-05-15 PROCEDURE — 74011250636 HC RX REV CODE- 250/636: Performed by: NURSE PRACTITIONER

## 2020-05-15 PROCEDURE — 77030020256 HC SOL INJ NACL 0.9%  500ML

## 2020-05-15 PROCEDURE — 74011000258 HC RX REV CODE- 258: Performed by: NURSE ANESTHETIST, CERTIFIED REGISTERED

## 2020-05-15 PROCEDURE — 36415 COLL VENOUS BLD VENIPUNCTURE: CPT

## 2020-05-15 DEVICE — GRAFT VASC L10CM DIA7MM THN WALLED LN GOR TX: Type: IMPLANTABLE DEVICE | Site: ARM | Status: FUNCTIONAL

## 2020-05-15 RX ORDER — KETAMINE HYDROCHLORIDE 10 MG/ML
INJECTION, SOLUTION INTRAMUSCULAR; INTRAVENOUS AS NEEDED
Status: DISCONTINUED | OUTPATIENT
Start: 2020-05-15 | End: 2020-05-15 | Stop reason: HOSPADM

## 2020-05-15 RX ORDER — PROPOFOL 10 MG/ML
INJECTION, EMULSION INTRAVENOUS AS NEEDED
Status: DISCONTINUED | OUTPATIENT
Start: 2020-05-15 | End: 2020-05-15 | Stop reason: HOSPADM

## 2020-05-15 RX ORDER — SODIUM CHLORIDE 9 MG/ML
INJECTION, SOLUTION INTRAVENOUS
Status: DISCONTINUED | OUTPATIENT
Start: 2020-05-15 | End: 2020-05-15 | Stop reason: HOSPADM

## 2020-05-15 RX ORDER — PROPOFOL 10 MG/ML
INJECTION, EMULSION INTRAVENOUS
Status: DISCONTINUED | OUTPATIENT
Start: 2020-05-15 | End: 2020-05-15 | Stop reason: HOSPADM

## 2020-05-15 RX ORDER — FENTANYL CITRATE 50 UG/ML
INJECTION, SOLUTION INTRAMUSCULAR; INTRAVENOUS AS NEEDED
Status: DISCONTINUED | OUTPATIENT
Start: 2020-05-15 | End: 2020-05-15 | Stop reason: HOSPADM

## 2020-05-15 RX ORDER — HYDROCODONE BITARTRATE AND ACETAMINOPHEN 7.5; 325 MG/1; MG/1
1 TABLET ORAL ONCE
Status: DISCONTINUED | OUTPATIENT
Start: 2020-05-15 | End: 2020-05-15 | Stop reason: HOSPADM

## 2020-05-15 RX ORDER — DEXMEDETOMIDINE HYDROCHLORIDE 100 UG/ML
INJECTION, SOLUTION INTRAVENOUS AS NEEDED
Status: DISCONTINUED | OUTPATIENT
Start: 2020-05-15 | End: 2020-05-15 | Stop reason: HOSPADM

## 2020-05-15 RX ORDER — LIDOCAINE HYDROCHLORIDE 10 MG/ML
20 INJECTION INFILTRATION; PERINEURAL ONCE
Status: COMPLETED | OUTPATIENT
Start: 2020-05-15 | End: 2020-05-15

## 2020-05-15 RX ORDER — HYDROCODONE BITARTRATE AND ACETAMINOPHEN 7.5; 325 MG/1; MG/1
1 TABLET ORAL
Qty: 25 TAB | Refills: 0 | Status: SHIPPED | OUTPATIENT
Start: 2020-05-15 | End: 2020-05-20

## 2020-05-15 RX ORDER — MIDAZOLAM HYDROCHLORIDE 1 MG/ML
INJECTION, SOLUTION INTRAMUSCULAR; INTRAVENOUS AS NEEDED
Status: DISCONTINUED | OUTPATIENT
Start: 2020-05-15 | End: 2020-05-15 | Stop reason: HOSPADM

## 2020-05-15 RX ORDER — PHENYLEPHRINE HCL IN 0.9% NACL 0.4MG/10ML
SYRINGE (ML) INTRAVENOUS AS NEEDED
Status: DISCONTINUED | OUTPATIENT
Start: 2020-05-15 | End: 2020-05-15 | Stop reason: HOSPADM

## 2020-05-15 RX ADMIN — FENTANYL CITRATE 25 MCG: 50 INJECTION, SOLUTION INTRAMUSCULAR; INTRAVENOUS at 14:22

## 2020-05-15 RX ADMIN — SODIUM CHLORIDE 600 MG: 9 INJECTION, SOLUTION INTRAVENOUS at 14:25

## 2020-05-15 RX ADMIN — PROPOFOL 25 MG: 10 INJECTION, EMULSION INTRAVENOUS at 14:33

## 2020-05-15 RX ADMIN — PROPOFOL 25 MG: 10 INJECTION, EMULSION INTRAVENOUS at 14:28

## 2020-05-15 RX ADMIN — DEXMEDETOMIDINE HYDROCHLORIDE 8 MCG: 100 INJECTION, SOLUTION, CONCENTRATE INTRAVENOUS at 14:59

## 2020-05-15 RX ADMIN — Medication 80 MCG: at 15:12

## 2020-05-15 RX ADMIN — KETAMINE HYDROCHLORIDE 20 MG: 10 INJECTION, SOLUTION INTRAMUSCULAR; INTRAVENOUS at 14:47

## 2020-05-15 RX ADMIN — FENTANYL CITRATE 25 MCG: 50 INJECTION, SOLUTION INTRAMUSCULAR; INTRAVENOUS at 14:33

## 2020-05-15 RX ADMIN — PROPOFOL 25 MG: 10 INJECTION, EMULSION INTRAVENOUS at 14:30

## 2020-05-15 RX ADMIN — DEXMEDETOMIDINE HYDROCHLORIDE 4 MCG: 100 INJECTION, SOLUTION, CONCENTRATE INTRAVENOUS at 14:49

## 2020-05-15 RX ADMIN — PROPOFOL 25 MCG/KG/MIN: 10 INJECTION, EMULSION INTRAVENOUS at 14:20

## 2020-05-15 RX ADMIN — SODIUM CHLORIDE: 900 INJECTION, SOLUTION INTRAVENOUS at 13:58

## 2020-05-15 RX ADMIN — DEXMEDETOMIDINE HYDROCHLORIDE 8 MCG: 100 INJECTION, SOLUTION, CONCENTRATE INTRAVENOUS at 15:03

## 2020-05-15 RX ADMIN — FENTANYL CITRATE 25 MCG: 50 INJECTION, SOLUTION INTRAMUSCULAR; INTRAVENOUS at 14:30

## 2020-05-15 RX ADMIN — KETAMINE HYDROCHLORIDE 10 MG: 10 INJECTION, SOLUTION INTRAMUSCULAR; INTRAVENOUS at 15:05

## 2020-05-15 RX ADMIN — KETAMINE HYDROCHLORIDE 10 MG: 10 INJECTION, SOLUTION INTRAMUSCULAR; INTRAVENOUS at 14:24

## 2020-05-15 RX ADMIN — KETAMINE HYDROCHLORIDE 10 MG: 10 INJECTION, SOLUTION INTRAMUSCULAR; INTRAVENOUS at 14:27

## 2020-05-15 RX ADMIN — DEXMEDETOMIDINE HYDROCHLORIDE 4 MCG: 100 INJECTION, SOLUTION, CONCENTRATE INTRAVENOUS at 14:41

## 2020-05-15 RX ADMIN — FENTANYL CITRATE 25 MCG: 50 INJECTION, SOLUTION INTRAMUSCULAR; INTRAVENOUS at 14:47

## 2020-05-15 RX ADMIN — DEXMEDETOMIDINE HYDROCHLORIDE 4 MCG: 100 INJECTION, SOLUTION, CONCENTRATE INTRAVENOUS at 14:46

## 2020-05-15 RX ADMIN — MIDAZOLAM 1 MG: 1 INJECTION INTRAMUSCULAR; INTRAVENOUS at 14:15

## 2020-05-15 RX ADMIN — MIDAZOLAM 1 MG: 1 INJECTION INTRAMUSCULAR; INTRAVENOUS at 14:18

## 2020-05-15 NOTE — ANESTHESIA POSTPROCEDURE EVALUATION
Procedure(s):  LEFT ARM DIALYSIS GRAFT REVISION WITH REPAIR OF ANEYURYSM  ESSENTIAL  REQ TF.    MAC    Anesthesia Post Evaluation        Patient location during evaluation: PACU  Patient participation: complete - patient participated  Level of consciousness: awake and alert  Pain management: adequate  Airway patency: patent  Anesthetic complications: no  Cardiovascular status: acceptable  Respiratory status: acceptable  Hydration status: acceptable  Comments: I have seen and evaluated the patient and is ready for discharge. Cindy Blankenship MD    Post anesthesia nausea and vomiting:  none      Vitals Value Taken Time   /51 5/15/2020  3:30 PM   Temp 36.6 °C (97.8 °F) 5/15/2020  3:30 PM   Pulse 92 5/15/2020  3:35 PM   Resp 10 5/15/2020  3:35 PM   SpO2 100 % 5/15/2020  3:35 PM   Vitals shown include unvalidated device data.

## 2020-05-15 NOTE — BRIEF OP NOTE
Brief Postoperative Note    Patient: Sim Sinha  YOB: 1955  MRN: [id number]    Date of Procedure: 5/15/2020     Pre-Op Diagnosis: DIALYSIS GRAFT ANEURYSM LEFT ARM    Post-Op Diagnosis: Same as preoperative diagnosis. Procedure(s):  LEFT ARM DIALYSIS GRAFT REVISION WITH REPAIR OF ANEYURYSM  ESSENTIAL  REQ TF    Surgeon(s):  Uche Low MD    Surgical Assistant: None    Anesthesia: General     Estimated Blood Loss (mL): less than 50     Complications: None    Specimens: * No specimens in log *     Implants:   Implant Name Type Inv.  Item Serial No.  Lot No. LRB No. Used Action   GRAFT VASC TW [id number] -- [employer] - [id number]  GRAFT VASC TW [id number] -- [place] Stai [id number]  [employer] N/A Left 1 Implanted       Drains: * No LDAs found *    Findings: none    Electronically Signed by Levy Cerna MD on 5/15/2020 at 3:22 PM

## 2020-05-15 NOTE — DISCHARGE INSTRUCTIONS
Patient Discharge Instructions    Tito Steen / [id number] : 1955    Admitted 5/15/2020 Discharged: 5/15/2020     Take Home Medications   · It is important that you take the medication exactly as they are prescribed. · Keep your medication in the bottles provided by the pharmacist and keep a list of the medication names, dosages, and times to be taken in your wallet. · Do not take other medications without consulting your doctor. What to do at Home    Recommended diet: Renal Diet; start with light, bland foods; advance if no nausea    Recommended activity: Activity as tolerated    Additional Instructions: leave arm dressed until dialysis on Monday    Follow-up with Dr Alicia Burgess in 2 weeks, call [phone] for appt        Information obtained by :  I understand that if any problems occur once I am at home I am to contact my physician. I understand and acknowledge receipt of the instructions indicated above. Physician's or R.N.'s Signature                                                                  Date/Time                                                                                                                                              Patient or Representative Signature                                                          Date/Time    ______________________________________________________________________    Anesthesia Discharge Instructions    After general anesthesia or intervenous sedation, for 24 hours or while taking prescription Narcotics:  · Limit your activities  · Do not drive or operate hazardous machinery  · If you have not urinated within 8 hours after discharge, please contact your surgeon on call.   · Do not make important personal or business decisions  · Do not drink alcoholic beverages    Report the following to your surgeon:  · Excessive pain, swelling, redness or odor of or around the surgical area  · Temperature over 100.5 degrees  · Nausea and vomiting lasting longer than 4 hours or if unable to take medication  · Any signs of decreased circulation or nerve impairment to extremity:  Change in color, persistent numbness, tingling, coldness or increased pain.   · Any questions

## 2020-05-15 NOTE — OP NOTES
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 PROCEDURE PERFORMED:  Repair of left arm graft aneurysm with interposition graft. SURGEON:  Mita Stover MD    ASSISTANT:  Kay Lewis. ANESTHESIA:  Local with sedation. COMPLICATIONS:  None. SPECIMENS REMOVED:  None. IMPLANTS:  7-mm [place]-Roni graft. ESTIMATED BLOOD LOSS:  25 mL. INDICATIONS:  The patient is a 58-year-old male with end-stage renal disease on hemodialysis. He has a straight left upper arm graft in place. He has a large aneurysm involving the graft just above the elbow. There is overlying skin breakdown with concern for bleeding. The aneurysm will be repaired. PROCEDURE:  The patient's left arm was prepped and draped. 1% lidocaine was used for local anesthesia. A transverse incision was made between the arterial anastomosis and the aneurysm. What appeared to be an old fistula at this level was dissected free, which represented the inflow portion of the graft. A second incision was then made just above the aneurysm. The normal graft was identified at this level and dissected free. The access was clamped above and below the aneurysm and the graft divided at each of these locations. A new segment of 7-mm thin-walled [place]-Roni graft was obtained and was sewn end-to-end to the arterial side of the access using running CV5 [place]-Roni suture. This was then tunneled laterally around the aneurysm using a counter incision. The new segment of graft was brought to lie alongside the outflow side of the old graft just above the aneurysm and was sewn end-to-end using running CV5 [place]-Roni suture. Following this, flow was restored. Hemostasis was obtained. The aneurysm was opened and old thrombus was evacuated decompressing the aneurysm and removing the bulk of the aneurysm. The incisions were then closed with Vicryl, subcutaneous suture and skin staples. Dressings were applied and the patient was returned to the recovery room in stable condition.       Dorian Hager MD      [id number]  D:  05/15/2020 15:28  T:  05/15/2020 15:56  JOB #:  [id number]

## 2020-05-15 NOTE — ANESTHESIA PREPROCEDURE EVALUATION
Anesthetic History   No history of anesthetic complications            Review of Systems / Medical History  Patient summary reviewed, nursing notes reviewed and pertinent labs reviewed    Pulmonary  Within defined limits          Asthma        Neuro/Psych   Within defined limits           Cardiovascular  Within defined limits  Hypertension              Exercise tolerance: >4 METS     GI/Hepatic/Renal  Within defined limits       Renal disease: dialysis  PUD     Endo/Other  Within defined limits      Arthritis     Other Findings              Physical Exam    Airway  Mallampati: II  TM Distance: 4 - 6 cm  Neck ROM: normal range of motion   Mouth opening: Normal     Cardiovascular  Regular rate and rhythm,  S1 and S2 normal,  no murmur, click, rub, or gallop             Dental  No notable dental hx       Pulmonary  Breath sounds clear to auscultation               Abdominal  GI exam deferred       Other Findings            Anesthetic Plan    ASA: 3  Anesthesia type: MAC          Induction: Intravenous  Anesthetic plan and risks discussed with: Patient

## 2020-05-15 NOTE — PROGRESS NOTES
CM notified of needed transportation assistance. RN verified pt ambulatory and reports pt discharging to [address] [facility] [address], [place], [address]. CM arranged Lyft transport via [address] for [phone]; RN notified.      Ginger Nava RN, BSN  Care Management Department

## 2020-08-03 RX ORDER — CYCLOBENZAPRINE HCL 10 MG
TABLET ORAL
Qty: 90 TAB | Refills: 0 | Status: SHIPPED | OUTPATIENT
Start: 2020-08-03 | End: 2020-08-05 | Stop reason: SDUPTHER

## 2020-08-05 RX ORDER — CYCLOBENZAPRINE HCL 10 MG
TABLET ORAL
Qty: 90 TAB | Refills: 0 | Status: SHIPPED | OUTPATIENT
Start: 2020-08-05

## 2020-08-06 ENCOUNTER — HOSPITAL ENCOUNTER (OUTPATIENT)
Dept: LAB | Age: 65
Discharge: HOME | End: 2020-08-06

## 2020-08-06 LAB
HCT VFR BLD AUTO: 24.2 % (ref 36.6–50.3)
HGB BLD-MCNC: 7 G/DL (ref 12.1–17)

## 2020-08-06 PROCEDURE — 85018 HEMOGLOBIN: CPT

## 2020-08-06 PROCEDURE — 99000 SPECIMEN HANDLING OFFICE-LAB: CPT

## 2020-11-12 ENCOUNTER — OFFICE VISIT (OUTPATIENT)
Dept: INTERNAL MEDICINE CLINIC | Age: 65
End: 2020-11-12
Payer: MEDICARE

## 2020-11-12 VITALS
RESPIRATION RATE: 16 BRPM | DIASTOLIC BLOOD PRESSURE: 70 MMHG | BODY MASS INDEX: 25.27 KG/M2 | WEIGHT: 161 LBS | OXYGEN SATURATION: 97 % | HEART RATE: 117 BPM | SYSTOLIC BLOOD PRESSURE: 120 MMHG | TEMPERATURE: 97 F | HEIGHT: 67 IN

## 2020-11-12 DIAGNOSIS — N18.6 CKD (CHRONIC KIDNEY DISEASE) STAGE V REQUIRING CHRONIC DIALYSIS (HCC): ICD-10-CM

## 2020-11-12 DIAGNOSIS — Z12.11 SCREENING FOR COLON CANCER: ICD-10-CM

## 2020-11-12 DIAGNOSIS — J45.20 MILD INTERMITTENT ASTHMA WITHOUT COMPLICATION: ICD-10-CM

## 2020-11-12 DIAGNOSIS — Z99.2 CKD (CHRONIC KIDNEY DISEASE) STAGE V REQUIRING CHRONIC DIALYSIS (HCC): ICD-10-CM

## 2020-11-12 DIAGNOSIS — I10 ESSENTIAL HYPERTENSION: Primary | ICD-10-CM

## 2020-11-12 LAB
ALBUMIN SERPL-MCNC: 3.7 G/DL (ref 3.5–5)
ALBUMIN/GLOB SERPL: 1.1 {RATIO} (ref 1.1–2.2)
ALP SERPL-CCNC: 109 U/L (ref 45–117)
ALT SERPL-CCNC: 16 U/L (ref 12–78)
ANION GAP SERPL CALC-SCNC: 9 MMOL/L (ref 5–15)
AST SERPL-CCNC: 14 U/L (ref 15–37)
BILIRUB SERPL-MCNC: 0.7 MG/DL (ref 0.2–1)
BUN SERPL-MCNC: 32 MG/DL (ref 6–20)
BUN/CREAT SERPL: 5 (ref 12–20)
CALCIUM SERPL-MCNC: 10.3 MG/DL (ref 8.5–10.1)
CHLORIDE SERPL-SCNC: 98 MMOL/L (ref 97–108)
CO2 SERPL-SCNC: 31 MMOL/L (ref 21–32)
CREAT SERPL-MCNC: 6.47 MG/DL (ref 0.7–1.3)
ERYTHROCYTE [DISTWIDTH] IN BLOOD BY AUTOMATED COUNT: 15.5 % (ref 11.5–14.5)
GLOBULIN SER CALC-MCNC: 3.4 G/DL (ref 2–4)
GLUCOSE SERPL-MCNC: 98 MG/DL (ref 65–100)
HCT VFR BLD AUTO: 37.1 % (ref 36.6–50.3)
HGB BLD-MCNC: 11.1 G/DL (ref 12.1–17)
MCH RBC QN AUTO: 31.4 PG (ref 26–34)
MCHC RBC AUTO-ENTMCNC: 29.9 G/DL (ref 30–36.5)
MCV RBC AUTO: 105.1 FL (ref 80–99)
NRBC # BLD: 0 K/UL (ref 0–0.01)
NRBC BLD-RTO: 0 PER 100 WBC
PLATELET # BLD AUTO: 268 K/UL (ref 150–400)
PMV BLD AUTO: 10.6 FL (ref 8.9–12.9)
POTASSIUM SERPL-SCNC: 4.7 MMOL/L (ref 3.5–5.1)
PROT SERPL-MCNC: 7.1 G/DL (ref 6.4–8.2)
RBC # BLD AUTO: 3.53 M/UL (ref 4.1–5.7)
SODIUM SERPL-SCNC: 138 MMOL/L (ref 136–145)
WBC # BLD AUTO: 7.6 K/UL (ref 4.1–11.1)

## 2020-11-12 PROCEDURE — G8427 DOCREV CUR MEDS BY ELIG CLIN: HCPCS | Performed by: INTERNAL MEDICINE

## 2020-11-12 PROCEDURE — 1101F PT FALLS ASSESS-DOCD LE1/YR: CPT | Performed by: INTERNAL MEDICINE

## 2020-11-12 PROCEDURE — G8432 DEP SCR NOT DOC, RNG: HCPCS | Performed by: INTERNAL MEDICINE

## 2020-11-12 PROCEDURE — G9231 DOC ESRD DIA TRANS PREG: HCPCS | Performed by: INTERNAL MEDICINE

## 2020-11-12 PROCEDURE — G8419 CALC BMI OUT NRM PARAM NOF/U: HCPCS | Performed by: INTERNAL MEDICINE

## 2020-11-12 PROCEDURE — 99214 OFFICE O/P EST MOD 30 MIN: CPT | Performed by: INTERNAL MEDICINE

## 2020-11-12 PROCEDURE — G8536 NO DOC ELDER MAL SCRN: HCPCS | Performed by: INTERNAL MEDICINE

## 2020-11-12 PROCEDURE — 3017F COLORECTAL CA SCREEN DOC REV: CPT | Performed by: INTERNAL MEDICINE

## 2020-11-12 RX ORDER — METOPROLOL SUCCINATE 25 MG/1
TABLET, EXTENDED RELEASE ORAL
COMMUNITY
Start: 2020-10-29

## 2020-11-12 RX ORDER — FLUTICASONE PROPIONATE AND SALMETEROL 250; 50 UG/1; UG/1
1 POWDER RESPIRATORY (INHALATION) 2 TIMES DAILY
Qty: 1 INHALER | Refills: 12 | Status: SHIPPED | OUTPATIENT
Start: 2020-11-12 | End: 2021-11-27

## 2020-11-12 RX ORDER — GUAIFENESIN AND DEXTROMETHORPHAN HYDROBROMIDE 1200; 60 MG/1; MG/1
TABLET, EXTENDED RELEASE ORAL
COMMUNITY

## 2020-11-12 NOTE — PROGRESS NOTES
Chief Complaint   Patient presents with    COPD    Cerebrovascular Accident     3 most recent [facility] 36 Screens 11/12/2020   PHQ Not Done Active Diagnosis of Depression or Bipolar Disorder   Little interest or pleasure in doing things -   Feeling down, depressed, irritable, or hopeless -   Total Score PHQ 2 -     1. Have you been to the ER, urgent care clinic since your last visit? Hospitalized since your last visit?no    2. Have you seen or consulted any other health care providers outside of the [address] [place] [address] since your last visit? Include any pap smears or colon screening.  no

## 2020-11-12 NOTE — PROGRESS NOTES
Asif Costa is a 77 y.o. male and presents with COPD and Cerebrovascular Accident  . Subjective:  Asthma Review:  The patient is being seen for follow up of asthma,  currently stable. Asthma symptoms occur: infrequently. Wheezing when present is described as mild and easily relieved with rescue bronchodilator. The patient reports use of a steroid inhaler. Frequency of use of quick-relief meds: rarely. Regimen compliance: The patient reports adherence to this regimen. Hypertension Review:  The patient has essential hypertension  Diet and Lifestyle: generally follows a  low sodium diet, exercises sporadically  Home BP Monitoring: is not measured at home. Pertinent ROS: taking medications as instructed, no medication side effects noted, no TIA's, no chest pain on exertion, no dyspnea on exertion, no swelling of ankles. Chronic Renal Disease Review:  HPI: Asif Costa, ([id number]) is a 77 y.o. male who returns for follow up of   chronic renal disease caused by unknown etiology. te patient  is followed by renal  The patient has experienced the following symptoms: no problems   The patient has not experienced any of the following symptoms: no problems   Prescribed diet is JORDY   The following actions have been prescribed for slowing the   progression of CRF: cardiovascular risk factor reduction including none   The patientis  on dialysis      He has been treated in the past for Covid. Asif Costa is a 77 y.o. male and presents for annual Medicare Wellness Visit. Problem List: Reviewed with patient and discussed risk factors.     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    CKD (chronic kidney disease) stage V requiring chronic dialysis (HCC) N18.6, Z99.2    Dyspnea R06.00    CAP (community acquired pneumonia) J18.9       Current medical providers:  Patient Care Team:  Matilda Pandya MD as PCP - General (Internal Medicine)  Matilda Pandya MD as PCP - [address] [place] [address] Provider  Pieter Soto MD (General Surgery)  Natasha Carrel, NP as Nurse Practitioner (General Surgery)  Marina Multani MD as Surgeon (General Surgery)  Stephany Robles MD (Cardiology)  Oscar Buenrostro MD (Pulmonary Disease)    PSH: Reviewed with patient  Past Surgical History:   Procedure Laterality Date    CARDIAC SURG PROCEDURE UNLIST  4/29/15    CARDIAC CATHGERIZATION    HX GI      COLONOSCOPY    HX HEENT      TONSILLECTOMY    HX HERNIA REPAIR  3/8/16    Repair of right inguinal hernia with mesh,Repair of ventral incisional hernia with mesh    HX ORTHOPAEDIC Right 10/13/15     THR    HX ORTHOPAEDIC      left hip    HX OTHER SURGICAL  10/11/2016    revision of AV fistula left arm with repair of pseudoaneurysm    HX SMALL BOWEL RESECTION      HX UROLOGICAL Bilateral 2005    REMOVAL OF KIDNEYS    HX VASCULAR ACCESS      LT ARM WITH PORTS FOR DYALISIS        SH: Reviewed with patient  Social History     Tobacco Use    Smoking status: Never Smoker    Smokeless tobacco: Never Used   Substance Use Topics    Alcohol use: No    Drug use: No       FH: Reviewed with patient  Family History   Problem Relation Age of Onset    Cancer Mother         BREAST    Cancer Sister         THROAT    Stroke Brother     Anesth Problems Neg Hx        Medications/Allergies: Reviewed with patient  Current Outpatient Medications on File Prior to Visit   Medication Sig Dispense Refill    dextromethorphan-guaiFENesin (Mucinex DM) 60-1,200 mg Tb12 Take  by mouth.       cyclobenzaprine (FLEXERIL) 10 mg tablet Take 1 tablet by mouth three times daily as needed for muscle spasm 90 Tab 0    b complex-vitamin c-folic acid (RENAL CAPS) 1 mg capsule Take 1 Cap by mouth.  midodrine (PROAMITINE) 5 mg tablet Take  by mouth three (3) times daily.  bisacodyl (DULCOLAX, BISACODYL,) 5 mg EC tablet Take 5 mg by mouth daily as needed for Constipation.  calcium acetate (PHOSLO) 667 mg cap Take 3 Caps by mouth three (3) times daily (with meals). Patient claims to take 1cap TID      aspirin delayed-release 81 mg tablet Take 81 mg by mouth daily.  albuterol (PROVENTIL HFA, VENTOLIN HFA, PROAIR HFA) 90 mcg/actuation inhaler Take 2 Puffs by inhalation every four (4) hours as needed for Wheezing. 1 Inhaler 12    metoprolol succinate (TOPROL-XL) 25 mg XL tablet TAKE 1 2 (ONE HALF) TABLET BY MOUTH ONCE DAILY      folic acid (FOLVITE) 1 mg tablet Take 1 Tab by mouth daily. 30 Tab 3    linaCLOtide (LINZESS) 72 mcg cap capsule Take 2 Caps by mouth daily. 30 Cap 1    divalproex ER (DEPAKOTE ER) 500 mg ER tablet Take 1 Tab by mouth nightly. 30 Tab 3    methylphenidate HCl (METADATE CD) 10 mg ER capsule Take 1 Cap by mouth every morning. Max Daily Amount: 10 mg. 30 Cap 0    SYMBICORT 160-4.5 mcg/actuation HFAA Take 2 Puffs by inhalation two (2) times a day. 1 Inhaler 12    fluticasone propion-salmeterol (ADVAIR DISKUS) 250-50 mcg/dose diskus inhaler Take 1 Puff by inhalation every twelve (12) hours. Not taking because of cost; never filled prescription      acetaminophen (TYLENOL) 325 mg tablet Take 2 Tabs by mouth every six (6) hours. Indications: PAIN 60 Tab 0     No current facility-administered medications on file prior to visit.        Allergies   Allergen Reactions    Milk Anaphylaxis    Shellfish Derived Anaphylaxis    Benadryl [Diphenhydramine Hcl] Nausea and Vomiting    Zofran Odt [Ondansetron] Nausea and Vomiting    Iodinated Contrast Media Other (comments)    Peanut Shortness of Breath    Penicillins Unknown (comments)       Objective:  Visit Vitals  /70 (BP 1 Location: Right arm, BP Patient Position: Sitting)   Pulse (!) 117   Temp 97 °F (36.1 °C) (Temporal)   Resp 16   Ht 5' 7\" (1.702 m)   Wt 161 lb (73 kg)   SpO2 97%   BMI 25.22 kg/m²    Body mass index is 25.22 kg/m². Assessment of cognitive impairment: Alert and oriented x 3    Depression Screen:   3 most recent PHQ Screens 11/12/2020   PHQ Not Done Active Diagnosis of Depression or Bipolar Disorder   Little interest or pleasure in doing things -   Feeling down, depressed, irritable, or hopeless -   Total Score PHQ 2 -     Depression Review:  Patient is seen for screen of depression,denies anhedonia, weight gain, insomnia, hypersomnia, psychomotor agitation, psychomotor retardation, fatigue, feelings of worthlessness/guilt, difficulty concentrating, hopelessness, impaired memory and recurrent thoughts of death Treatment includes no medication   She denies recurrent thoughts of death and suicidal thoughts without plan. He has had some mild cognitive impairement reported. Fall Risk Assessment:    Fall Risk Assessment, last 12 mths 11/12/2020   Able to walk? Yes   Fall in past 12 months? No   Fall with injury? No       Functional Ability:   Does the patient exhibit a steady gait? yes   How long did it take the patient to get up and walk from a sitting position? seconds   Is the patient self reliant?  (ie can do own laundry, meals, household chores)  yes     Does the patient handle his/her own medications? yes     Does the patient handle his/her own money? yes     Is the patients home safe (ie good lighting, handrails on stairs and bath, etc.)? yes     Did you notice or did patient express any hearing difficulties? no     Did you notice or did patient express any vision difficulties?   no     Were distance and reading eye charts used?   no       Advance Care Planning:   Patient was offered the opportunity to discuss advance care planning:  yes     Does patient have an Advance Directive:  no   If no, did you provide information on Caring Connections? yes       Plan:      Orders Placed This Encounter    metoprolol succinate (TOPROL-XL) 25 mg XL tablet    dextromethorphan-guaiFENesin (Mucinex DM) 60-1,200 mg Tb12       Health Maintenance   Topic Date Due    DTaP/Tdap/Td series (1 - Tdap) 07/28/1974    Shingrix Vaccine Age 50> (1 of 2) 07/28/2003    Colorectal Cancer Screening Combo  09/18/2015    Medicare Yearly Exam  05/26/2018    GLAUCOMA SCREENING Q2Y  07/28/2018    Pneumococcal 65+ years (1 of 1 - PPSV23) 07/28/2018    Pneumococcal 65+ yrs at Risk Vaccine (1 of 2 - PCV13) 07/28/2018    Flu Vaccine (1) 09/01/2020    Lipid Screen  10/23/2023    Hepatitis C Screening  Completed       *Patient verbalized understanding and agreement with the plan. A copy of the After Visit Summary with personalized health plan was given to the patient today. Review of Systems  Constitutional: negative for fevers, chills, anorexia and weight loss  Eyes:   negative for visual disturbance and irritation  ENT:   negative for tinnitus,sore throat,nasal congestion,ear pains. hoarseness  Respiratory:  negative for cough, hemoptysis, dyspnea,wheezing  CV:   negative for chest pain, palpitations, lower extremity edema  GI:   negative for nausea, vomiting, diarrhea, abdominal pain,melena  Endo:               negative for polyuria,polydipsia,polyphagia,heat intolerance  Genitourinary: negative for frequency, dysuria and hematuria  Integument:  negative for rash and pruritus  Hematologic:  negative for easy bruising and gum/nose bleeding  Musculoskel: negative for myalgias, arthralgias, back pain, muscle weakness, joint pain  Neurological: memory problems reported  Behavl/Psych:feelings of anxiety, depression, mood changes    Past Medical History:   Diagnosis Date    Adverse effect of anesthesia     \"MY BLOOD PRESSURE DROPS ,\"    Alzheimer's disease ([facility] [address] 2/18/2011    PT DENIES    Arthritis     Chronic kidney disease     KIDNEYS FAILED R/T HTN    Chronic pain     LEFT HIP PAIN    Constipation     Diarrhea     Heart failure ([facility] [address] 6/2015    CHF    Hemodialysis [facility])     M-W-F    Joint pain     Osteoarthrosis, unspecified whether generalized or localized, unspecified site 2/18/2011    Other unknown and unspecified cause of morbidity or mortality     PT DOSES OFF FREQUENTLY    PUD (peptic ulcer disease)     Renal Disease 2/18/2011    Right inguinal hernia 3/1/2016    Sleep apnea     Snoring     SOB (shortness of breath)     Unspecified asthma(493.90) 2/18/2011    Unspecified essential hypertension 2/18/2011    Ventral incisional hernia 3/1/2016    Visual disturbance      Past Surgical History:   Procedure Laterality Date    CARDIAC SURG PROCEDURE UNLIST  4/29/15    CARDIAC CATHGERIZATION    HX GI      COLONOSCOPY    HX HEENT      TONSILLECTOMY    HX HERNIA REPAIR  3/8/16    Repair of right inguinal hernia with mesh,Repair of ventral incisional hernia with mesh    HX ORTHOPAEDIC Right 10/13/15     THR    HX ORTHOPAEDIC      left hip    HX OTHER SURGICAL  10/11/2016    revision of AV fistula left arm with repair of pseudoaneurysm    HX SMALL BOWEL RESECTION      HX UROLOGICAL Bilateral 2005    REMOVAL OF KIDNEYS    HX VASCULAR ACCESS      LT ARM WITH PORTS FOR DYALISIS     Social History     Socioeconomic History    Marital status:      Spouse name: Not on file    Number of children: Not on file    Years of education: Not on file    Highest education level: Not on file   Tobacco Use    Smoking status: Never Smoker    Smokeless tobacco: Never Used   Substance and Sexual Activity    Alcohol use: No    Drug use: No     Family History   Problem Relation Age of Onset    Cancer Mother         BREAST    Cancer Sister         THROAT    Stroke Brother     Anesth Problems Neg Hx      Current Outpatient Medications   Medication Sig Dispense Refill    dextromethorphan-guaiFENesin (Mucinex DM) 60-1,200 mg Tb12 Take  by mouth.  cyclobenzaprine (FLEXERIL) 10 mg tablet Take 1 tablet by mouth three times daily as needed for muscle spasm 90 Tab 0    b complex-vitamin c-folic acid (RENAL CAPS) 1 mg capsule Take 1 Cap by mouth.  midodrine (PROAMITINE) 5 mg tablet Take  by mouth three (3) times daily.  bisacodyl (DULCOLAX, BISACODYL,) 5 mg EC tablet Take 5 mg by mouth daily as needed for Constipation.  calcium acetate (PHOSLO) 667 mg cap Take 3 Caps by mouth three (3) times daily (with meals). Patient claims to take 1cap TID      aspirin delayed-release 81 mg tablet Take 81 mg by mouth daily.  albuterol (PROVENTIL HFA, VENTOLIN HFA, PROAIR HFA) 90 mcg/actuation inhaler Take 2 Puffs by inhalation every four (4) hours as needed for Wheezing. 1 Inhaler 12    metoprolol succinate (TOPROL-XL) 25 mg XL tablet TAKE 1 2 (ONE HALF) TABLET BY MOUTH ONCE DAILY      folic acid (FOLVITE) 1 mg tablet Take 1 Tab by mouth daily. 30 Tab 3    linaCLOtide (LINZESS) 72 mcg cap capsule Take 2 Caps by mouth daily. 30 Cap 1    divalproex ER (DEPAKOTE ER) 500 mg ER tablet Take 1 Tab by mouth nightly. 30 Tab 3    methylphenidate HCl (METADATE CD) 10 mg ER capsule Take 1 Cap by mouth every morning. Max Daily Amount: 10 mg. 30 Cap 0    SYMBICORT 160-4.5 mcg/actuation HFAA Take 2 Puffs by inhalation two (2) times a day. 1 Inhaler 12    fluticasone propion-salmeterol (ADVAIR DISKUS) 250-50 mcg/dose diskus inhaler Take 1 Puff by inhalation every twelve (12) hours. Not taking because of cost; never filled prescription      acetaminophen (TYLENOL) 325 mg tablet Take 2 Tabs by mouth every six (6) hours.  Indications: PAIN 60 Tab 0     Allergies   Allergen Reactions    Milk Anaphylaxis    Shellfish Derived Anaphylaxis    Benadryl [Diphenhydramine Hcl] Nausea and Vomiting    Zofran Odt [Ondansetron] Nausea and Vomiting    Iodinated Contrast Media Other (comments)    Peanut Shortness of Breath    Penicillins Unknown (comments)       Objective:  Visit Vitals  /70 (BP 1 Location: Right arm, BP Patient Position: Sitting)   Pulse (!) 117   Temp 97 °F (36.1 °C) (Temporal)   Resp 16   Ht 5' 7\" (1.702 m)   Wt 161 lb (73 kg)   SpO2 97%   BMI 25.22 kg/m²     Physical Exam:   General appearance - alert, well appearing, and in no distress  Mental status - alert, oriented to person, place, and time  EYE-ILIANA, EOMI, corneas normal, no foreign bodies  ENT-ENT exam normal, no neck nodes or sinus tenderness  Nose - normal and patent, no erythema, discharge or polyps  Mouth - mucous membranes moist, pharynx normal without lesions  Neck - supple, no significant adenopathy   Chest - clear to auscultation, no wheezes, rales or rhonchi, symmetric air entry   Heart - normal rate, regular rhythm, normal S1, S2, no murmurs, rubs, clicks or gallops   Abdomen - soft, nontender, nondistended, no masses or organomegaly  Lymph- no adenopathy palpable  Ext-peripheral pulses normal, no pedal edema, no clubbing or cyanosis  Skin-Warm and dry. no hyperpigmentation, vitiligo, or suspicious lesions  Neuro -alert, oriented, normal speech, no focal findings or movement disorder noted  Neck-normal C-spine, no tenderness, full ROM without pain  Feet-no nail deformities or callus formation with good pulses noted      Results for orders placed or performed during the hospital encounter of 08/06/20   HGB & HCT   Result Value Ref Range    HGB 7.0 (L) 12.1 - 17.0 g/dL    HCT 24.2 (L) 36.6 - 50.3 %       Assessment/Plan:  No diagnosis found. Orders Placed This Encounter    metoprolol succinate (TOPROL-XL) 25 mg XL tablet     Sig: TAKE 1 2 (ONE HALF) TABLET BY MOUTH ONCE DAILY    dextromethorphan-guaiFENesin (Mucinex DM) 60-1,200 mg Tb12     Sig: Take  by mouth.      lose weight, increase physical activity, follow low fat diet, follow low salt diet  Patient Instructions   MyChart Activation    Thank you for requesting access to "2,10E+07". Please follow the instructions below to securely access and download your online medical record. "2,10E+07" allows you to send messages to your doctor, view your test results, renew your prescriptions, schedule appointments, and more. How Do I Sign Up? 1. In your internet browser, go to [email or web address]  2. Click on the First Time User? Click Here link in the Sign In box. You will be redirect to the New Member Sign Up page. 3. Enter your "2,10E+07" Access Code exactly as it appears below. You will not need to use this code after youve completed the sign-up process. If you do not sign up before the expiration date, you must request a new code. "2,10E+07" Access Code: [email or web address]  Expires: 2020 10:55 AM (This is the date your "2,10E+07" access code will )    4. Enter the last four digits of your Social Security Number (xxxx) and Date of Birth (mm/dd/yyyy) as indicated and click Submit. You will be taken to the next sign-up page. 5. Create a "2,10E+07" ID. This will be your "2,10E+07" login ID and cannot be changed, so think of one that is secure and easy to remember. 6. Create a "2,10E+07" password. You can change your password at any time. 7. Enter your Password Reset Question and Answer. This can be used at a later time if you forget your password. 8. Enter your e-mail address. You will receive e-mail notification when new information is available in [address] 9. Click Sign Up. You can now view and download portions of your medical record. 10. Click the Download Summary menu link to download a portable copy of your medical information. Additional Information    If you have questions, please visit the Frequently Asked Questions section of the "2,10E+07" website at [email or web address] Remember, "2,10E+07" is NOT to be used for urgent needs. For medical emergencies, dial 911.            Follow-up and Dispositions    · Return in about 3 months (around 2/12/2021), or if symptoms worsen or fail to improve. I have reviewed with the patient details of the assessment and plan and all questions were answered. Relevent patient education was performed    An After Visit Summary was printed and given to the patient.

## 2020-11-12 NOTE — PATIENT INSTRUCTIONS
trueAnthem Activation    Thank you for requesting access to trueAnthem. Please follow the instructions below to securely access and download your online medical record. trueAnthem allows you to send messages to your doctor, view your test results, renew your prescriptions, schedule appointments, and more. How Do I Sign Up? 1. In your internet browser, go to [email or web address]  2. Click on the First Time User? Click Here link in the Sign In box. You will be redirect to the New Member Sign Up page. 3. Enter your trueAnthem Access Code exactly as it appears below. You will not need to use this code after youve completed the sign-up process. If you do not sign up before the expiration date, you must request a new code. trueAnthem Access Code: [email or web address]  Expires: 2020 10:55 AM (This is the date your trueAnthem access code will )    4. Enter the last four digits of your Social Security Number (xxxx) and Date of Birth (mm/dd/yyyy) as indicated and click Submit. You will be taken to the next sign-up page. 5. Create a trueAnthem ID. This will be your trueAnthem login ID and cannot be changed, so think of one that is secure and easy to remember. 6. Create a trueAnthem password. You can change your password at any time. 7. Enter your Password Reset Question and Answer. This can be used at a later time if you forget your password. 8. Enter your e-mail address. You will receive e-mail notification when new information is available in [address] 9. Click Sign Up. You can now view and download portions of your medical record. 10. Click the Download Summary menu link to download a portable copy of your medical information. Additional Information    If you have questions, please visit the Frequently Asked Questions section of the trueAnthem website at [email or web address] Remember, trueAnthem is NOT to be used for urgent needs. For medical emergencies, dial 911.

## 2020-11-25 LAB
HEMOCCULT STL QL IA: NEGATIVE
SPECIMEN STATUS REPORT, ROLRST: NORMAL

## 2021-09-02 LAB — COLONOSCOPY, EXTERNAL: NORMAL

## 2021-10-14 LAB — COLONOSCOPY, EXTERNAL: NORMAL

## 2021-10-29 ENCOUNTER — HOSPITAL ENCOUNTER (OUTPATIENT)
Dept: LAB | Age: 66
Discharge: HOME | End: 2021-10-29

## 2021-10-29 LAB — POTASSIUM SERPL-SCNC: 3.4 MMOL/L (ref 3.5–5.1)

## 2021-10-29 PROCEDURE — 99000 SPECIMEN HANDLING OFFICE-LAB: CPT

## 2021-10-29 PROCEDURE — 84132 ASSAY OF SERUM POTASSIUM: CPT

## 2021-11-16 LAB — COLONOSCOPY, EXTERNAL: NORMAL

## 2021-11-30 DIAGNOSIS — J45.909 MILD ASTHMA WITHOUT COMPLICATION, UNSPECIFIED WHETHER PERSISTENT: ICD-10-CM

## 2021-11-30 NOTE — TELEPHONE ENCOUNTER
Pt left a voice message requesting a refill for the Albuterol INH HFA.     [phone]    Last visit 11/12/2020 MD Bhaskar Gerard   Next appointment Nothing scheduled   Previous refill encounter(s)   08/27/2015 Albuterol HFA INH #1 with 12 refills     Requested Prescriptions     Pending Prescriptions Disp Refills    albuterol (PROVENTIL HFA, VENTOLIN HFA, PROAIR HFA) 90 mcg/actuation inhaler 1 Each 1     Sig: Take 2 Puffs by inhalation every four (4) hours as needed for Wheezing.

## 2021-12-01 RX ORDER — ALBUTEROL SULFATE 90 UG/1
2 AEROSOL, METERED RESPIRATORY (INHALATION)
Qty: 1 EACH | Refills: 1 | Status: SHIPPED | OUTPATIENT
Start: 2021-12-01

## 2022-01-10 ENCOUNTER — HOSPITAL ENCOUNTER (EMERGENCY)
Age: 67
Discharge: HOME | End: 2022-01-10
Attending: STUDENT IN AN ORGANIZED HEALTH CARE EDUCATION/TRAINING PROGRAM
Payer: MEDICARE

## 2022-01-10 VITALS
SYSTOLIC BLOOD PRESSURE: 143 MMHG | OXYGEN SATURATION: 98 % | HEART RATE: 86 BPM | DIASTOLIC BLOOD PRESSURE: 84 MMHG | RESPIRATION RATE: 16 BRPM | TEMPERATURE: 98.7 F

## 2022-01-10 DIAGNOSIS — R11.0 NAUSEA WITHOUT VOMITING: Primary | ICD-10-CM

## 2022-01-10 PROCEDURE — 99282 EMERGENCY DEPT VISIT SF MDM: CPT

## 2022-01-10 NOTE — ED TRIAGE NOTES
Patient arrives via EMS stating today he \"at a bunch of lifesavers and I started hiccuping\". Per pt he was receiving dialysis and they sent him to be evaluated for dry heaving.  Pt states he has no complaints at this time and would like to go home

## 2022-01-10 NOTE — ED PROVIDER NOTES
The patient is a 78-year-old male history of end-stage renal disease on dialysis presenting today secondary to hiccups and nausea. Patient says he just finished his dialysis this afternoon when he started having hiccups and nausea with dry heaves but he never actually vomited. He said this lasted about 20 minutes after resolving on its own. He said this happened after he ate several lifesavers on an empty stomach. He said that he did not want to go to the emergency department but EMS brought him here anyway. He has no complaints whatsoever right now and does not want further evaluation.            Past Medical History:   Diagnosis Date    Adverse effect of anesthesia     \"MY BLOOD PRESSURE DROPS ,\"    Alzheimer's disease ([facility] [address] 2/18/2011    PT DENIES    Arthritis     Chronic kidney disease     KIDNEYS FAILED R/T HTN    Chronic pain     LEFT HIP PAIN    Constipation     Diarrhea     Heart failure ([facility] [address] 6/2015    CHF    Hemodialysis patient [facility])     M-W-F    Joint pain     Osteoarthrosis, unspecified whether generalized or localized, unspecified site 2/18/2011    Other unknown and unspecified cause of morbidity or mortality     PT DOSES OFF FREQUENTLY    PUD (peptic ulcer disease)     Renal Disease 2/18/2011    Right inguinal hernia 3/1/2016    Sleep apnea     Snoring     SOB (shortness of breath)     Unspecified asthma(493.90) 2/18/2011    Unspecified essential hypertension 2/18/2011    Ventral incisional hernia 3/1/2016    Visual disturbance        Past Surgical History:   Procedure Laterality Date    HX GI      COLONOSCOPY    HX HEENT      TONSILLECTOMY    HX HERNIA REPAIR  3/8/16    Repair of right inguinal hernia with mesh,Repair of ventral incisional hernia with mesh    HX ORTHOPAEDIC Right 10/13/15     THR    HX ORTHOPAEDIC      left hip    HX OTHER SURGICAL  10/11/2016    revision of AV fistula left arm with repair of pseudoaneurysm    HX SMALL BOWEL RESECTION      HX UROLOGICAL Bilateral 2005    REMOVAL OF KIDNEYS    HX VASCULAR ACCESS      LT ARM WITH PORTS FOR DYALISIS    OR CARDIAC SURG PROCEDURE UNLIST  4/29/15    CARDIAC CATHGERIZATION         Family History:   Problem Relation Age of Onset    Cancer Mother         BREAST    Cancer Sister         THROAT    Stroke Brother     Anesth Problems Neg Hx        Social History     Socioeconomic History    Marital status:      Spouse name: Not on file    Number of children: Not on file    Years of education: Not on file    Highest education level: Not on file   Occupational History    Not on file   Tobacco Use    Smoking status: Never Smoker    Smokeless tobacco: Never Used   Substance and Sexual Activity    Alcohol use: No    Drug use: No    Sexual activity: Not on file   Other Topics Concern    Not on file   Social History Narrative    Not on file     Social Determinants of Health     Financial Resource Strain:     Difficulty of Paying Living Expenses: Not on file   Food Insecurity:     Worried About [facility] in the Last Year: Not on file    Sarah of Food in the Last Year: Not on file   Transportation Needs:     Lack of Transportation (Medical): Not on file    Lack of Transportation (Non-Medical):  Not on file   Physical Activity:     Days of Exercise per Week: Not on file    Minutes of Exercise per Session: Not on file   Stress:     Feeling of Stress : Not on file   Social Connections:     Frequency of Communication with Friends and Family: Not on file    Frequency of Social Gatherings with Friends and Family: Not on file    Attends Yarsani Services: Not on file    Active Member of Clubs or Organizations: Not on file    Attends Club or Organization Meetings: Not on file    Marital Status: Not on file   Intimate Partner Violence:     Fear of Current or Ex-Partner: Not on file    Emotionally Abused: Not on file    Physically Abused: Not on file    Sexually Abused: Not on file Housing Stability:     Unable to Pay for Housing in the Last Year: Not on file    Number of Places Lived in the Last Year: Not on file    Unstable Housing in the Last Year: Not on file         ALLERGIES: Milk, Shellfish derived, Benadryl [diphenhydramine hcl], Zofran odt [ondansetron], Iodinated contrast media, Peanut, and Penicillins    Review of Systems   Constitutional: Negative for chills and fever. HENT: Negative for congestion and sore throat. Eyes: Negative for pain and redness. Respiratory: Negative for cough and shortness of breath. Cardiovascular: Negative for chest pain and palpitations. Gastrointestinal: Positive for nausea. Negative for abdominal pain, diarrhea and vomiting. Genitourinary: Negative for frequency and hematuria. Musculoskeletal: Negative for back pain and neck pain. Skin: Negative for rash and wound. Neurological: Negative for dizziness and headaches. Hematological: Does not bruise/bleed easily. Vitals:    01/10/22 1555   BP: (!) 143/84   Pulse: 86   Resp: 16   Temp: 98.7 °F (37.1 °C)   SpO2: 98%            Physical Exam  Vitals and nursing note reviewed. Constitutional:       General: He is not in acute distress. Appearance: He is well-developed. HENT:      Head: Normocephalic and atraumatic. Eyes:      Conjunctiva/sclera: Conjunctivae normal.      Pupils: Pupils are equal, round, and reactive to light. Cardiovascular:      Rate and Rhythm: Normal rate and regular rhythm. Heart sounds: Normal heart sounds. No murmur heard. No friction rub. No gallop. Comments: Left upper extremity AV fistula with positive thrill and bruit  Pulmonary:      Effort: Pulmonary effort is normal. No respiratory distress. Breath sounds: Normal breath sounds. No wheezing or rales. Abdominal:      General: Bowel sounds are normal. There is no distension. Palpations: Abdomen is soft. Tenderness: There is no abdominal tenderness.  There is no guarding or rebound. Musculoskeletal:         General: Normal range of motion. Cervical back: Normal range of motion and neck supple. Skin:     General: Skin is warm and dry. Capillary Refill: Capillary refill takes less than 2 seconds. Findings: No rash. Neurological:      Mental Status: He is alert and oriented to person, place, and time. MDM       Procedures      Well-appearing 68-year-old male presents today after an episode of hiccups and nausea/dry heaves. He is completely asymptomatic now and does not want further work-up or intervention. He told the dialysis center that he did not want to get seen in the emergency department but they insisted. He has stable vital signs and is in no acute distress. I advised him that he could return if he had recurrence of symptoms or had other concerns that he wished to be worked up.       ICD-10-CM ICD-9-CM    1. Nausea without vomiting  R11.0 787.02      DC  Raoul Cantu, DO

## 2022-03-19 PROBLEM — R06.00 DYSPNEA: Status: ACTIVE | Noted: 2019-05-01

## 2022-03-19 PROBLEM — J18.9 CAP (COMMUNITY ACQUIRED PNEUMONIA): Status: ACTIVE | Noted: 2019-05-01

## 2022-07-21 ENCOUNTER — HOSPITAL ENCOUNTER (EMERGENCY)
Age: 67
Discharge: STILL ADMITTED OR ADMINISTRATIVE | End: 2022-07-21

## 2022-07-21 ENCOUNTER — APPOINTMENT (OUTPATIENT)
Dept: CT IMAGING | Age: 67
End: 2022-07-21
Attending: FAMILY MEDICINE
Payer: MEDICARE

## 2022-07-21 ENCOUNTER — HOSPITAL ENCOUNTER (EMERGENCY)
Age: 67
Discharge: HOME | End: 2022-07-21
Attending: EMERGENCY MEDICINE
Payer: MEDICARE

## 2022-07-21 VITALS
RESPIRATION RATE: 16 BRPM | TEMPERATURE: 97.7 F | SYSTOLIC BLOOD PRESSURE: 124 MMHG | HEART RATE: 54 BPM | WEIGHT: 165 LBS | BODY MASS INDEX: 26.52 KG/M2 | DIASTOLIC BLOOD PRESSURE: 69 MMHG | HEIGHT: 66 IN | OXYGEN SATURATION: 98 %

## 2022-07-21 DIAGNOSIS — S01.511A LIP LACERATION, INITIAL ENCOUNTER: Primary | ICD-10-CM

## 2022-07-21 PROCEDURE — 70486 CT MAXILLOFACIAL W/O DYE: CPT

## 2022-07-21 PROCEDURE — 70450 CT HEAD/BRAIN W/O DYE: CPT

## 2022-07-21 PROCEDURE — 99284 EMERGENCY DEPT VISIT MOD MDM: CPT

## 2022-07-21 NOTE — ED TRIAGE NOTES
Reports falling out of bed and hitting lip Saturday morning. Pain 0/10. bleeding in roof of mouth and scan on lip.

## 2022-07-21 NOTE — ED PROVIDER NOTES
Patient is a 26-year-old male with past medical history of CKD, CHF, OA presenting for evaluation of lip laceration status post ground-level fall. Reports that he fell on Saturday morning, hitting his lip and mouth on a grocery cart. He did not lose consciousness. Declined going to the emergency department until today because he felt that he was fine. Notes that he has developed a scab over the laceration. Started to gargle with peroxide today and noted some bleeding to his mouth. He does have multiple broken teeth, but is unsure if this is new or old. No other complaints. Tetanus up-to-date.              Past Medical History:   Diagnosis Date    Adverse effect of anesthesia     \"MY BLOOD PRESSURE DROPS ,\"    Alzheimer's disease ([address] 2/18/2011    PT DENIES    Arthritis     Chronic kidney disease     KIDNEYS FAILED R/T HTN    Chronic pain     LEFT HIP PAIN    Constipation     Diarrhea     Heart failure ([facility] [address] 6/2015    CHF    Hemodialysis patient [facility])     M-W-F    Joint pain     Osteoarthrosis, unspecified whether generalized or localized, unspecified site 2/18/2011    Other unknown and unspecified cause of morbidity or mortality     PT DOSES OFF FREQUENTLY    PUD (peptic ulcer disease)     Renal Disease 2/18/2011    Right inguinal hernia 3/1/2016    Sleep apnea     Snoring     SOB (shortness of breath)     Unspecified asthma(493.90) 2/18/2011    Unspecified essential hypertension 2/18/2011    Ventral incisional hernia 3/1/2016    Visual disturbance        Past Surgical History:   Procedure Laterality Date    HX GI      COLONOSCOPY    HX HEENT      TONSILLECTOMY    HX HERNIA REPAIR  3/8/16    Repair of right inguinal hernia with mesh,Repair of ventral incisional hernia with mesh    HX ORTHOPAEDIC Right 10/13/15     THR    HX ORTHOPAEDIC      left hip    HX OTHER SURGICAL  10/11/2016    revision of AV fistula left arm with repair of pseudoaneurysm    HX SMALL BOWEL RESECTION      HX UROLOGICAL Bilateral 2005 REMOVAL OF KIDNEYS    HX VASCULAR ACCESS      LT ARM WITH PORTS FOR DYALISIS    [place] CARDIAC SURG PROCEDURE UNLIST  4/29/15    CARDIAC CATHGERIZATION         Family History:   Problem Relation Age of Onset    Cancer Mother         BREAST    Cancer Sister         THROAT    Stroke Brother     Anesth Problems Neg Hx        Social History     Socioeconomic History    Marital status:      Spouse name: Not on file    Number of children: Not on file    Years of education: Not on file    Highest education level: Not on file   Occupational History    Not on file   Tobacco Use    Smoking status: Never    Smokeless tobacco: Never   Substance and Sexual Activity    Alcohol use: No    Drug use: No    Sexual activity: Not on file   Other Topics Concern    Not on file   Social History Narrative    Not on file     Social Determinants of Health     Financial Resource Strain: Not on file   Food Insecurity: Not on file   Transportation Needs: Not on file   Physical Activity: Not on file   Stress: Not on file   Social Connections: Not on file   Intimate Partner Violence: Not on file   Housing Stability: Not on file         ALLERGIES: Milk, Shellfish derived, Benadryl [diphenhydramine hcl], Zofran odt [ondansetron], Iodinated contrast media, Peanut, and Penicillins    Review of Systems   Constitutional:  Negative for unexpected weight change. HENT:  Negative for congestion. Eyes:  Negative for visual disturbance. Respiratory:  Negative for cough, chest tightness and shortness of breath. Cardiovascular:  Negative for chest pain. Gastrointestinal:  Negative for abdominal pain, nausea and vomiting. Endocrine: Negative for polyuria. Genitourinary:  Negative for dysuria and flank pain. Musculoskeletal:  Negative for arthralgias. Skin:  Negative for color change. Allergic/Immunologic: Negative for immunocompromised state. Neurological:  Negative for dizziness and headaches.    Hematological:  Negative for adenopathy. Psychiatric/Behavioral:  Negative for agitation. Vitals:    07/21/22 1730 07/21/22 1740   BP: 130/87    Pulse: (!) 54    Resp: 16    Temp: 97.7 °F (36.5 °C)    SpO2: 99% 99%   Weight: 74.8 kg (165 lb)    Height: 5' 6\" (1.676 m)             Physical Exam  Vitals and nursing note reviewed. Constitutional:       Appearance: Normal appearance. He is normal weight. HENT:      Head:        Mouth/Throat:      Comments: Multiple broken teeth present, no active bleeding appreciated  Eyes:      Conjunctiva/sclera: Conjunctivae normal.      Pupils: Pupils are equal, round, and reactive to light. Cardiovascular:      Rate and Rhythm: Normal rate. Pulmonary:      Effort: Pulmonary effort is normal. No respiratory distress. Abdominal:      General: Abdomen is flat. Musculoskeletal:         General: Normal range of motion. Cervical back: Neck supple. Skin:     General: Skin is warm and dry. Capillary Refill: Capillary refill takes less than 2 seconds. Neurological:      General: No focal deficit present. Mental Status: He is alert and oriented to person, place, and time. Mental status is at baseline. Cranial Nerves: No cranial nerve deficit. Psychiatric:         Mood and Affect: Mood normal.         Behavior: Behavior normal.        MDM  Number of Diagnoses or Management Options  Lip laceration, initial encounter  Diagnosis management comments: Patient presenting with possible tooth injury, lip laceration status post ground-level fall. CT with evidence of extensive periodontal disease, limited eval for dental fracture. Patient does not have a dentist to follow-up with. Will refer him to [facility]. Lip laceration is multiple days out and already scabbed over, will not repair at this time. Encouraged ice, follow-up with PCP. Discussed my clinical impression(s), any labs and/or radiology results with the patient.  I answered any questions and addressed any concerns. Discussed the importance of following up with their primary care physician and/or specialist(s). Discussed signs or symptoms that would warrant return back to the ER for further evaluation. The patient is agreeable with discharge. Amount and/or Complexity of Data Reviewed  Tests in the radiology section of CPT®: ordered and reviewed    Patient Progress  Patient progress: stable         Procedures    I did not personally see and evaluate this patient but was available for consultation by WILEY.     Taniya España MD

## 2023-05-03 ENCOUNTER — APPOINTMENT (OUTPATIENT)
Dept: GENERAL RADIOLOGY | Age: 68
End: 2023-05-03
Attending: HOSPITALIST
Payer: MEDICARE

## 2023-05-03 ENCOUNTER — APPOINTMENT (OUTPATIENT)
Dept: CT IMAGING | Age: 68
End: 2023-05-03
Attending: HOSPITALIST
Payer: MEDICARE

## 2023-05-03 ENCOUNTER — HOSPITAL ENCOUNTER (INPATIENT)
Facility: HOSPITAL | Age: 68
LOS: 4 days | Discharge: HOME HEALTH | DRG: 640 | End: 2023-05-07
Attending: HOSPITALIST | Admitting: HOSPITALIST
Payer: MEDICARE

## 2023-05-03 ENCOUNTER — HOSPITAL ENCOUNTER (INPATIENT)
Age: 68
LOS: 3 days | Discharge: HOME HEALTH | End: 2023-05-06
Attending: STUDENT IN AN ORGANIZED HEALTH CARE EDUCATION/TRAINING PROGRAM | Admitting: HOSPITALIST
Payer: MEDICARE

## 2023-05-03 DIAGNOSIS — R77.8 ELEVATED TROPONIN: Primary | ICD-10-CM

## 2023-05-03 DIAGNOSIS — R55 SYNCOPE AND COLLAPSE: ICD-10-CM

## 2023-05-03 LAB
ALBUMIN SERPL-MCNC: 2.6 G/DL (ref 3.5–5)
ALBUMIN/GLOB SERPL: 0.6 (ref 1.1–2.2)
ALP SERPL-CCNC: 73 U/L (ref 45–117)
ALT SERPL-CCNC: 24 U/L (ref 12–78)
ANION GAP SERPL CALC-SCNC: 6 MMOL/L (ref 5–15)
ARTERIAL PATENCY WRIST A: POSITIVE
AST SERPL-CCNC: 73 U/L (ref 15–37)
ATRIAL RATE: 109 BPM
BASE EXCESS BLD CALC-SCNC: 7.1 MMOL/L
BASOPHILS # BLD: 0 K/UL (ref 0–0.1)
BASOPHILS NFR BLD: 0 % (ref 0–1)
BDY SITE: ABNORMAL
BILIRUB SERPL-MCNC: 1.3 MG/DL (ref 0.2–1)
BUN SERPL-MCNC: 52 MG/DL (ref 6–20)
BUN/CREAT SERPL: 6 (ref 12–20)
CALCIUM SERPL-MCNC: 8.7 MG/DL (ref 8.5–10.1)
CALCULATED P AXIS, ECG09: 71 DEGREES
CALCULATED R AXIS, ECG10: 38 DEGREES
CALCULATED T AXIS, ECG11: 83 DEGREES
CHLORIDE SERPL-SCNC: 99 MMOL/L (ref 97–108)
CO2 SERPL-SCNC: 28 MMOL/L (ref 21–32)
COMMENT, HOLDF: NORMAL
CREAT SERPL-MCNC: 8.9 MG/DL (ref 0.7–1.3)
D DIMER PPP FEU-MCNC: 6.91 MG/L FEU (ref 0–0.65)
DIAGNOSIS, 93000: NORMAL
DIFFERENTIAL METHOD BLD: ABNORMAL
EOSINOPHIL # BLD: 1.1 K/UL (ref 0–0.4)
EOSINOPHIL NFR BLD: 14 % (ref 0–7)
ERYTHROCYTE [DISTWIDTH] IN BLOOD BY AUTOMATED COUNT: 15.6 % (ref 11.5–14.5)
GAS FLOW.O2 O2 DELIVERY SYS: ABNORMAL
GLOBULIN SER CALC-MCNC: 4.6 G/DL (ref 2–4)
GLUCOSE BLD STRIP.AUTO-MCNC: 97 MG/DL (ref 65–117)
GLUCOSE SERPL-MCNC: 116 MG/DL (ref 65–100)
HCO3 BLD-SCNC: 31.1 MMOL/L (ref 22–26)
HCT VFR BLD AUTO: 30.7 % (ref 36.6–50.3)
HGB BLD-MCNC: 9.4 G/DL (ref 12.1–17)
IMM GRANULOCYTES # BLD AUTO: 0 K/UL (ref 0–0.04)
IMM GRANULOCYTES NFR BLD AUTO: 0 % (ref 0–0.5)
LYMPHOCYTES # BLD: 0.6 K/UL (ref 0.8–3.5)
LYMPHOCYTES NFR BLD: 8 % (ref 12–49)
MCH RBC QN AUTO: 33.7 PG (ref 26–34)
MCHC RBC AUTO-ENTMCNC: 30.6 G/DL (ref 30–36.5)
MCV RBC AUTO: 110 FL (ref 80–99)
MONOCYTES # BLD: 0.7 K/UL (ref 0–1)
MONOCYTES NFR BLD: 9 % (ref 5–13)
NEUTS SEG # BLD: 5.5 K/UL (ref 1.8–8)
NEUTS SEG NFR BLD: 69 % (ref 32–75)
NRBC # BLD: 0 K/UL (ref 0–0.01)
NRBC BLD-RTO: 0 PER 100 WBC
O2/TOTAL GAS SETTING VFR VENT: 5 %
P-R INTERVAL, ECG05: 170 MS
PCO2 BLD: 40.5 MMHG (ref 35–45)
PH BLD: 7.49 (ref 7.35–7.45)
PLATELET # BLD AUTO: 281 K/UL (ref 150–400)
PLATELET COMMENTS,PCOM: ABNORMAL
PMV BLD AUTO: 10.5 FL (ref 8.9–12.9)
PO2 BLD: 122 MMHG (ref 80–100)
POTASSIUM SERPL-SCNC: 6 MMOL/L (ref 3.5–5.1)
PROT SERPL-MCNC: 7.2 G/DL (ref 6.4–8.2)
Q-T INTERVAL, ECG07: 366 MS
QRS DURATION, ECG06: 94 MS
QTC CALCULATION (BEZET), ECG08: 492 MS
RBC # BLD AUTO: 2.79 M/UL (ref 4.1–5.7)
RBC MORPH BLD: ABNORMAL
RBC MORPH BLD: ABNORMAL
SAMPLES BEING HELD,HOLD: NORMAL
SAO2 % BLD: 99 % (ref 92–97)
SERVICE CMNT-IMP: NORMAL
SODIUM SERPL-SCNC: 133 MMOL/L (ref 136–145)
SPECIMEN TYPE: ABNORMAL
TROPONIN I SERPL HS-MCNC: 212 NG/L (ref 0–57)
TROPONIN I SERPL HS-MCNC: 227 NG/L (ref 0–57)
VENTRICULAR RATE, ECG03: 109 BPM
WBC # BLD AUTO: 7.9 K/UL (ref 4.1–11.1)

## 2023-05-03 PROCEDURE — 82962 GLUCOSE BLOOD TEST: CPT

## 2023-05-03 PROCEDURE — 65270000046 HC RM TELEMETRY

## 2023-05-03 PROCEDURE — 36600 WITHDRAWAL OF ARTERIAL BLOOD: CPT

## 2023-05-03 PROCEDURE — 85379 FIBRIN DEGRADATION QUANT: CPT

## 2023-05-03 PROCEDURE — 85025 COMPLETE CBC W/AUTO DIFF WBC: CPT

## 2023-05-03 PROCEDURE — 82803 BLOOD GASES ANY COMBINATION: CPT

## 2023-05-03 PROCEDURE — 9990 CHARGE CONVERSION

## 2023-05-03 PROCEDURE — 85520 HEPARIN ASSAY: CPT

## 2023-05-03 PROCEDURE — 74011250637 HC RX REV CODE- 250/637: Performed by: HOSPITALIST

## 2023-05-03 PROCEDURE — 70450 CT HEAD/BRAIN W/O DYE: CPT

## 2023-05-03 PROCEDURE — 74011000250 HC RX REV CODE- 250: Performed by: HOSPITALIST

## 2023-05-03 PROCEDURE — 80053 COMPREHEN METABOLIC PANEL: CPT

## 2023-05-03 PROCEDURE — 93005 ELECTROCARDIOGRAM TRACING: CPT

## 2023-05-03 PROCEDURE — 99285 EMERGENCY DEPT VISIT HI MDM: CPT

## 2023-05-03 PROCEDURE — 84484 ASSAY OF TROPONIN QUANT: CPT

## 2023-05-03 PROCEDURE — 71045 X-RAY EXAM CHEST 1 VIEW: CPT

## 2023-05-03 PROCEDURE — 36415 COLL VENOUS BLD VENIPUNCTURE: CPT

## 2023-05-03 PROCEDURE — 85730 THROMBOPLASTIN TIME PARTIAL: CPT

## 2023-05-03 RX ORDER — ONDANSETRON 2 MG/ML
4 INJECTION INTRAMUSCULAR; INTRAVENOUS
Status: DISCONTINUED | OUTPATIENT
Start: 2023-05-03 | End: 2023-05-06 | Stop reason: HOSPADM

## 2023-05-03 RX ORDER — ASPIRIN 81 MG/1
81 TABLET ORAL DAILY
Status: DISCONTINUED | OUTPATIENT
Start: 2023-05-04 | End: 2023-05-06 | Stop reason: HOSPADM

## 2023-05-03 RX ORDER — ONDANSETRON 4 MG/1
4 TABLET, ORALLY DISINTEGRATING ORAL
Status: DISCONTINUED | OUTPATIENT
Start: 2023-05-03 | End: 2023-05-06 | Stop reason: HOSPADM

## 2023-05-03 RX ORDER — HEPARIN SODIUM 10000 [USP'U]/100ML
18-36 INJECTION, SOLUTION INTRAVENOUS
Status: DISCONTINUED | OUTPATIENT
Start: 2023-05-03 | End: 2023-05-05

## 2023-05-03 RX ORDER — ROSUVASTATIN CALCIUM 20 MG/1
20 TABLET, COATED ORAL
COMMUNITY

## 2023-05-03 RX ORDER — SODIUM CHLORIDE 0.9 % (FLUSH) 0.9 %
5-40 SYRINGE (ML) INJECTION AS NEEDED
Status: DISCONTINUED | OUTPATIENT
Start: 2023-05-03 | End: 2023-05-06 | Stop reason: HOSPADM

## 2023-05-03 RX ORDER — POLYETHYLENE GLYCOL 3350 17 G/17G
17 POWDER, FOR SOLUTION ORAL DAILY PRN
Status: DISCONTINUED | OUTPATIENT
Start: 2023-05-03 | End: 2023-05-06 | Stop reason: HOSPADM

## 2023-05-03 RX ORDER — IPRATROPIUM BROMIDE AND ALBUTEROL SULFATE 2.5; .5 MG/3ML; MG/3ML
3 SOLUTION RESPIRATORY (INHALATION)
Status: DISCONTINUED | OUTPATIENT
Start: 2023-05-03 | End: 2023-05-06 | Stop reason: HOSPADM

## 2023-05-03 RX ORDER — DIVALPROEX SODIUM 500 MG/1
500 TABLET, EXTENDED RELEASE ORAL
Status: DISCONTINUED | OUTPATIENT
Start: 2023-05-03 | End: 2023-05-03

## 2023-05-03 RX ORDER — FOLIC ACID 1 MG/1
1 TABLET ORAL DAILY
Status: DISCONTINUED | OUTPATIENT
Start: 2023-05-04 | End: 2023-05-03

## 2023-05-03 RX ORDER — AZITHROMYCIN 250 MG/1
250 TABLET, FILM COATED ORAL DAILY
COMMUNITY
Start: 2023-05-03 | End: 2023-05-06

## 2023-05-03 RX ORDER — ROSUVASTATIN CALCIUM 10 MG/1
20 TABLET, COATED ORAL
Status: DISCONTINUED | OUTPATIENT
Start: 2023-05-03 | End: 2023-05-06 | Stop reason: HOSPADM

## 2023-05-03 RX ORDER — METOPROLOL SUCCINATE 25 MG/1
12.5 TABLET, EXTENDED RELEASE ORAL DAILY
Status: DISCONTINUED | OUTPATIENT
Start: 2023-05-04 | End: 2023-05-03

## 2023-05-03 RX ORDER — MIDODRINE HYDROCHLORIDE 5 MG/1
5 TABLET ORAL 3 TIMES DAILY
Status: DISCONTINUED | OUTPATIENT
Start: 2023-05-03 | End: 2023-05-03

## 2023-05-03 RX ORDER — MONTELUKAST SODIUM 10 MG/1
10 TABLET ORAL DAILY
Status: DISCONTINUED | OUTPATIENT
Start: 2023-05-04 | End: 2023-05-06 | Stop reason: HOSPADM

## 2023-05-03 RX ORDER — ACETAMINOPHEN 325 MG/1
650 TABLET ORAL
Status: DISCONTINUED | OUTPATIENT
Start: 2023-05-03 | End: 2023-05-06 | Stop reason: HOSPADM

## 2023-05-03 RX ORDER — ACETAMINOPHEN 650 MG/1
650 SUPPOSITORY RECTAL
Status: DISCONTINUED | OUTPATIENT
Start: 2023-05-03 | End: 2023-05-06 | Stop reason: HOSPADM

## 2023-05-03 RX ORDER — SODIUM CHLORIDE 0.9 % (FLUSH) 0.9 %
5-40 SYRINGE (ML) INJECTION EVERY 8 HOURS
Status: DISCONTINUED | OUTPATIENT
Start: 2023-05-03 | End: 2023-05-06 | Stop reason: HOSPADM

## 2023-05-03 RX ORDER — MONTELUKAST SODIUM 10 MG/1
10 TABLET ORAL DAILY
COMMUNITY

## 2023-05-03 RX ADMIN — ROSUVASTATIN CALCIUM 20 MG: 10 TABLET, FILM COATED ORAL at 21:52

## 2023-05-03 RX ADMIN — SODIUM CHLORIDE, PRESERVATIVE FREE 10 ML: 5 INJECTION INTRAVENOUS at 22:15

## 2023-05-03 RX ADMIN — IPRATROPIUM BROMIDE AND ALBUTEROL SULFATE 3 ML: .5; 3 SOLUTION RESPIRATORY (INHALATION) at 21:51

## 2023-05-04 ENCOUNTER — APPOINTMENT (OUTPATIENT)
Dept: VASCULAR SURGERY | Age: 68
End: 2023-05-04
Attending: HOSPITALIST
Payer: MEDICARE

## 2023-05-04 ENCOUNTER — APPOINTMENT (OUTPATIENT)
Dept: NON INVASIVE DIAGNOSTICS | Age: 68
End: 2023-05-04
Attending: HOSPITALIST
Payer: MEDICARE

## 2023-05-04 ENCOUNTER — APPOINTMENT (OUTPATIENT)
Dept: NUCLEAR MEDICINE | Age: 68
End: 2023-05-04
Attending: HOSPITALIST
Payer: MEDICARE

## 2023-05-04 LAB
ALBUMIN SERPL-MCNC: 2.2 G/DL (ref 3.5–5)
ANION GAP SERPL CALC-SCNC: 6 MMOL/L (ref 5–15)
ANION GAP SERPL CALC-SCNC: 7 MMOL/L (ref 5–15)
APTT PPP: 29.7 SEC (ref 22.1–31)
BASOPHILS # BLD: 0.1 K/UL (ref 0–0.1)
BASOPHILS NFR BLD: 1 % (ref 0–1)
BUN SERPL-MCNC: 60 MG/DL (ref 6–20)
BUN SERPL-MCNC: 65 MG/DL (ref 6–20)
BUN/CREAT SERPL: 6 (ref 12–20)
BUN/CREAT SERPL: 6 (ref 12–20)
CALCIUM SERPL-MCNC: 8.8 MG/DL (ref 8.5–10.1)
CALCIUM SERPL-MCNC: 9.2 MG/DL (ref 8.5–10.1)
CHLORIDE SERPL-SCNC: 100 MMOL/L (ref 97–108)
CHLORIDE SERPL-SCNC: 99 MMOL/L (ref 97–108)
CO2 SERPL-SCNC: 26 MMOL/L (ref 21–32)
CO2 SERPL-SCNC: 29 MMOL/L (ref 21–32)
CREAT SERPL-MCNC: 10.2 MG/DL (ref 0.7–1.3)
CREAT SERPL-MCNC: 11 MG/DL (ref 0.7–1.3)
DIFFERENTIAL METHOD BLD: ABNORMAL
ECHO AO ROOT DIAM: 4 CM
ECHO AO ROOT INDEX: 2.2 CM/M2
ECHO AV AREA PEAK VELOCITY: 1.8 CM2
ECHO AV AREA/BSA PEAK VELOCITY: 1 CM2/M2
ECHO AV PEAK GRADIENT: 13 MMHG
ECHO AV PEAK VELOCITY: 1.8 M/S
ECHO AV VELOCITY RATIO: 0.56
ECHO EST RA PRESSURE: 3 MMHG
ECHO LA DIAMETER INDEX: 2.64 CM/M2
ECHO LA DIAMETER: 4.8 CM
ECHO LA TO AORTIC ROOT RATIO: 1.2
ECHO LA VOL 2C: 122 ML (ref 18–58)
ECHO LA VOL 4C: 122 ML (ref 18–58)
ECHO LA VOLUME AREA LENGTH: 129 ML
ECHO LA VOLUME INDEX A2C: 67 ML/M2 (ref 16–34)
ECHO LA VOLUME INDEX A4C: 67 ML/M2 (ref 16–34)
ECHO LA VOLUME INDEX AREA LENGTH: 71 ML/M2 (ref 16–34)
ECHO LV E' LATERAL VELOCITY: 42 CM/S
ECHO LV E' SEPTAL VELOCITY: 4 CM/S
ECHO LV EDV A2C: 96 ML
ECHO LV EDV A4C: 147 ML
ECHO LV EDV BP: 121 ML (ref 67–155)
ECHO LV EDV INDEX A4C: 81 ML/M2
ECHO LV EDV INDEX BP: 66 ML/M2
ECHO LV EDV NDEX A2C: 53 ML/M2
ECHO LV EJECTION FRACTION A2C: 37 %
ECHO LV EJECTION FRACTION A4C: 64 %
ECHO LV EJECTION FRACTION BIPLANE: 53 % (ref 55–100)
ECHO LV ESV A2C: 61 ML
ECHO LV ESV A4C: 54 ML
ECHO LV ESV BP: 57 ML (ref 22–58)
ECHO LV ESV INDEX A2C: 34 ML/M2
ECHO LV ESV INDEX A4C: 30 ML/M2
ECHO LV ESV INDEX BP: 31 ML/M2
ECHO LV FRACTIONAL SHORTENING: 30 % (ref 28–44)
ECHO LV INTERNAL DIMENSION DIASTOLE INDEX: 2.36 CM/M2
ECHO LV INTERNAL DIMENSION DIASTOLIC: 4.3 CM (ref 4.2–5.9)
ECHO LV INTERNAL DIMENSION SYSTOLIC INDEX: 1.65 CM/M2
ECHO LV INTERNAL DIMENSION SYSTOLIC: 3 CM
ECHO LV IVSD: 1.3 CM (ref 0.6–1)
ECHO LV MASS 2D: 184.7 G (ref 88–224)
ECHO LV MASS INDEX 2D: 101.5 G/M2 (ref 49–115)
ECHO LV POSTERIOR WALL DIASTOLIC: 1.1 CM (ref 0.6–1)
ECHO LV RELATIVE WALL THICKNESS RATIO: 0.51
ECHO LVOT AREA: 3.5 CM2
ECHO LVOT DIAM: 2.1 CM
ECHO LVOT PEAK GRADIENT: 4 MMHG
ECHO LVOT PEAK VELOCITY: 1 M/S
ECHO MV A VELOCITY: 1.48 M/S
ECHO MV E DECELERATION TIME (DT): 271.3 MS
ECHO MV E VELOCITY: 0.92 M/S
ECHO MV E/A RATIO: 0.62
ECHO MV E/E' LATERAL: 2.19
ECHO MV E/E' RATIO (AVERAGED): 12.6
ECHO MV E/E' SEPTAL: 23
ECHO MV MAX VELOCITY: 1.7 M/S
ECHO MV MEAN GRADIENT: 4 MMHG
ECHO MV MEAN VELOCITY: 0.9 M/S
ECHO MV PEAK GRADIENT: 11 MMHG
ECHO MV PRESSURE HALF TIME (PHT): 72.6 MS
ECHO MV PRESSURE HALF TIME (PHT): 78.7 MS
ECHO MV VTI: 24.5 CM
ECHO PV MAX VELOCITY: 0.7 M/S
ECHO PV PEAK GRADIENT: 2 MMHG
ECHO RIGHT VENTRICULAR SYSTOLIC PRESSURE (RVSP): 58 MMHG
ECHO RV FREE WALL PEAK S': 15 CM/S
ECHO RV INTERNAL DIMENSION: 4 CM
ECHO RV TAPSE: 1.4 CM (ref 1.7–?)
ECHO TV REGURGITANT MAX VELOCITY: 3.7 M/S
ECHO TV REGURGITANT PEAK GRADIENT: 55 MMHG
EOSINOPHIL # BLD: 1.6 K/UL (ref 0–0.4)
EOSINOPHIL NFR BLD: 15 % (ref 0–7)
ERYTHROCYTE [DISTWIDTH] IN BLOOD BY AUTOMATED COUNT: 15.9 % (ref 11.5–14.5)
FLUAV AG NPH QL IA: NEGATIVE
FLUBV AG NOSE QL IA: NEGATIVE
GLUCOSE SERPL-MCNC: 103 MG/DL (ref 65–100)
GLUCOSE SERPL-MCNC: 132 MG/DL (ref 65–100)
HBV SURFACE AB SER QL: NONREACTIVE
HBV SURFACE AB SER-ACNC: <3.1 MIU/ML
HBV SURFACE AG SER QL: <0.1 INDEX
HBV SURFACE AG SER QL: NEGATIVE
HCT VFR BLD AUTO: 30 % (ref 36.6–50.3)
HGB BLD-MCNC: 8.9 G/DL (ref 12.1–17)
IMM GRANULOCYTES # BLD AUTO: 0 K/UL (ref 0–0.04)
IMM GRANULOCYTES NFR BLD AUTO: 0 % (ref 0–0.5)
LYMPHOCYTES # BLD: 1.4 K/UL (ref 0.8–3.5)
LYMPHOCYTES NFR BLD: 13 % (ref 12–49)
MAGNESIUM SERPL-MCNC: 2.1 MG/DL (ref 1.6–2.4)
MCH RBC QN AUTO: 33.1 PG (ref 26–34)
MCHC RBC AUTO-ENTMCNC: 29.7 G/DL (ref 30–36.5)
MCV RBC AUTO: 111.5 FL (ref 80–99)
MONOCYTES # BLD: 1.2 K/UL (ref 0–1)
MONOCYTES NFR BLD: 12 % (ref 5–13)
NEUTS SEG # BLD: 6.1 K/UL (ref 1.8–8)
NEUTS SEG NFR BLD: 59 % (ref 32–75)
NRBC # BLD: 0 K/UL (ref 0–0.01)
NRBC BLD-RTO: 0 PER 100 WBC
PHOSPHATE SERPL-MCNC: 4.1 MG/DL (ref 2.6–4.7)
PLATELET # BLD AUTO: 292 K/UL (ref 150–400)
PMV BLD AUTO: 10.6 FL (ref 8.9–12.9)
POTASSIUM SERPL-SCNC: 5.2 MMOL/L (ref 3.5–5.1)
POTASSIUM SERPL-SCNC: 5.8 MMOL/L (ref 3.5–5.1)
POTASSIUM SERPL-SCNC: 6.5 MMOL/L (ref 3.5–5.1)
PROCALCITONIN SERPL-MCNC: 5.85 NG/ML
RBC # BLD AUTO: 2.69 M/UL (ref 4.1–5.7)
RBC MORPH BLD: ABNORMAL
SARS-COV-2 RDRP RESP QL NAA+PROBE: NOT DETECTED
SODIUM SERPL-SCNC: 132 MMOL/L (ref 136–145)
SODIUM SERPL-SCNC: 135 MMOL/L (ref 136–145)
SOURCE, COVRS: NORMAL
THERAPEUTIC RANGE,PTTT: NORMAL SECS (ref 58–77)
UFH PPP CHRO-ACNC: 0.37 IU/ML
UFH PPP CHRO-ACNC: 0.38 IU/ML
UFH PPP CHRO-ACNC: <0.1 IU/ML
WBC # BLD AUTO: 10.4 K/UL (ref 4.1–11.1)

## 2023-05-04 PROCEDURE — 85520 HEPARIN ASSAY: CPT

## 2023-05-04 PROCEDURE — 86706 HEP B SURFACE ANTIBODY: CPT

## 2023-05-04 PROCEDURE — 93970 EXTREMITY STUDY: CPT

## 2023-05-04 PROCEDURE — 74011250636 HC RX REV CODE- 250/636: Performed by: STUDENT IN AN ORGANIZED HEALTH CARE EDUCATION/TRAINING PROGRAM

## 2023-05-04 PROCEDURE — 74011000250 HC RX REV CODE- 250: Performed by: HOSPITALIST

## 2023-05-04 PROCEDURE — 9990 CHARGE CONVERSION

## 2023-05-04 PROCEDURE — 87804 INFLUENZA ASSAY W/OPTIC: CPT

## 2023-05-04 PROCEDURE — 97535 SELF CARE MNGMENT TRAINING: CPT

## 2023-05-04 PROCEDURE — 74011250637 HC RX REV CODE- 250/637: Performed by: HOSPITALIST

## 2023-05-04 PROCEDURE — 65270000046 HC RM TELEMETRY

## 2023-05-04 PROCEDURE — 77010033678 HC OXYGEN DAILY

## 2023-05-04 PROCEDURE — 97165 OT EVAL LOW COMPLEX 30 MIN: CPT

## 2023-05-04 PROCEDURE — A9540 TC99M MAA: HCPCS

## 2023-05-04 PROCEDURE — 93306 TTE W/DOPPLER COMPLETE: CPT

## 2023-05-04 PROCEDURE — 36600 WITHDRAWAL OF ARTERIAL BLOOD: CPT

## 2023-05-04 PROCEDURE — 36415 COLL VENOUS BLD VENIPUNCTURE: CPT

## 2023-05-04 PROCEDURE — 84132 ASSAY OF SERUM POTASSIUM: CPT

## 2023-05-04 PROCEDURE — 84145 PROCALCITONIN (PCT): CPT

## 2023-05-04 PROCEDURE — 93306 TTE W/DOPPLER COMPLETE: CPT | Performed by: INTERNAL MEDICINE

## 2023-05-04 PROCEDURE — 97116 GAIT TRAINING THERAPY: CPT

## 2023-05-04 PROCEDURE — 83735 ASSAY OF MAGNESIUM: CPT

## 2023-05-04 PROCEDURE — 87340 HEPATITIS B SURFACE AG IA: CPT

## 2023-05-04 PROCEDURE — 74011000258 HC RX REV CODE- 258: Performed by: STUDENT IN AN ORGANIZED HEALTH CARE EDUCATION/TRAINING PROGRAM

## 2023-05-04 PROCEDURE — 80069 RENAL FUNCTION PANEL: CPT

## 2023-05-04 PROCEDURE — 97530 THERAPEUTIC ACTIVITIES: CPT

## 2023-05-04 PROCEDURE — 87635 SARS-COV-2 COVID-19 AMP PRB: CPT

## 2023-05-04 PROCEDURE — 97161 PT EVAL LOW COMPLEX 20 MIN: CPT

## 2023-05-04 PROCEDURE — 74011250636 HC RX REV CODE- 250/636: Performed by: HOSPITALIST

## 2023-05-04 RX ORDER — LEVOFLOXACIN 5 MG/ML
750 INJECTION, SOLUTION INTRAVENOUS EVERY 24 HOURS
Status: DISCONTINUED | OUTPATIENT
Start: 2023-05-04 | End: 2023-05-04

## 2023-05-04 RX ADMIN — SODIUM CHLORIDE, PRESERVATIVE FREE 10 ML: 5 INJECTION INTRAVENOUS at 14:43

## 2023-05-04 RX ADMIN — DOXYCYCLINE 100 MG: 100 INJECTION, POWDER, LYOPHILIZED, FOR SOLUTION INTRAVENOUS at 15:25

## 2023-05-04 RX ADMIN — HEPARIN SODIUM 18 UNITS/KG/HR: 10000 INJECTION, SOLUTION INTRAVENOUS at 00:43

## 2023-05-04 RX ADMIN — SODIUM CHLORIDE, PRESERVATIVE FREE 10 ML: 5 INJECTION INTRAVENOUS at 21:17

## 2023-05-04 RX ADMIN — SODIUM CHLORIDE, PRESERVATIVE FREE 10 ML: 5 INJECTION INTRAVENOUS at 07:01

## 2023-05-04 RX ADMIN — ASPIRIN 81 MG: 81 TABLET, COATED ORAL at 09:03

## 2023-05-04 RX ADMIN — HEPARIN SODIUM 18 UNITS/KG/HR: 10000 INJECTION, SOLUTION INTRAVENOUS at 20:32

## 2023-05-04 RX ADMIN — ROSUVASTATIN CALCIUM 20 MG: 10 TABLET, FILM COATED ORAL at 21:17

## 2023-05-04 RX ADMIN — MONTELUKAST 10 MG: 10 TABLET, FILM COATED ORAL at 09:03

## 2023-05-05 VITALS
HEIGHT: 66 IN | BODY MASS INDEX: 25.71 KG/M2 | OXYGEN SATURATION: 96 % | DIASTOLIC BLOOD PRESSURE: 69 MMHG | HEART RATE: 104 BPM | RESPIRATION RATE: 19 BRPM | SYSTOLIC BLOOD PRESSURE: 112 MMHG | TEMPERATURE: 100.1 F | WEIGHT: 160 LBS

## 2023-05-05 LAB
AMMONIA PLAS-SCNC: 31 UMOL/L
ANION GAP SERPL CALC-SCNC: 10 MMOL/L (ref 5–15)
ARTERIAL PATENCY WRIST A: POSITIVE
BASE EXCESS BLDV CALC-SCNC: 10.3 MMOL/L
BASOPHILS # BLD: 0 K/UL (ref 0–0.1)
BASOPHILS NFR BLD: 0 % (ref 0–1)
BDY SITE: ABNORMAL
BUN SERPL-MCNC: 81 MG/DL (ref 6–20)
BUN/CREAT SERPL: 7 (ref 12–20)
CALCIUM SERPL-MCNC: 8.7 MG/DL (ref 8.5–10.1)
CHLORIDE SERPL-SCNC: 97 MMOL/L (ref 97–108)
CO2 SERPL-SCNC: 26 MMOL/L (ref 21–32)
COMMENT:: NORMAL
CREAT SERPL-MCNC: 11.7 MG/DL (ref 0.7–1.3)
DIFFERENTIAL METHOD BLD: ABNORMAL
EOSINOPHIL # BLD: 2 K/UL (ref 0–0.4)
EOSINOPHIL NFR BLD: 19 % (ref 0–7)
ERYTHROCYTE [DISTWIDTH] IN BLOOD BY AUTOMATED COUNT: 15.4 % (ref 11.5–14.5)
GAS FLOW.O2 O2 DELIVERY SYS: ABNORMAL
GLUCOSE SERPL-MCNC: 91 MG/DL (ref 65–100)
HCO3 BLDV-SCNC: 33.6 MMOL/L (ref 23–28)
HCT VFR BLD AUTO: 25.5 % (ref 36.6–50.3)
HGB BLD-MCNC: 7.8 G/DL (ref 12.1–17)
IMM GRANULOCYTES # BLD AUTO: 0.1 K/UL (ref 0–0.04)
IMM GRANULOCYTES NFR BLD AUTO: 1 % (ref 0–0.5)
LYMPHOCYTES # BLD: 1.5 K/UL (ref 0.8–3.5)
LYMPHOCYTES NFR BLD: 15 % (ref 12–49)
MCH RBC QN AUTO: 34.1 PG (ref 26–34)
MCHC RBC AUTO-ENTMCNC: 30.6 G/DL (ref 30–36.5)
MCV RBC AUTO: 111.4 FL (ref 80–99)
MONOCYTES # BLD: 1.2 K/UL (ref 0–1)
MONOCYTES NFR BLD: 12 % (ref 5–13)
NEUTS SEG # BLD: 5.5 K/UL (ref 1.8–8)
NEUTS SEG NFR BLD: 53 % (ref 32–75)
NRBC # BLD: 0 K/UL (ref 0–0.01)
NRBC BLD-RTO: 0 PER 100 WBC
O2/TOTAL GAS SETTING VFR VENT: 21 %
PCO2 BLDV: 38.6 MMHG (ref 41–51)
PH BLDV: 7.55 (ref 7.32–7.42)
PLATELET # BLD AUTO: 324 K/UL (ref 150–400)
PMV BLD AUTO: 10.7 FL (ref 8.9–12.9)
PO2 BLDV: 49 MMHG (ref 25–40)
POTASSIUM SERPL-SCNC: 5.1 MMOL/L (ref 3.5–5.1)
RBC # BLD AUTO: 2.29 M/UL (ref 4.1–5.7)
RBC MORPH BLD: ABNORMAL
RBC MORPH BLD: ABNORMAL
SAO2 % BLDV: 88.6 % (ref 65–88)
SERVICE CMNT-IMP: ABNORMAL
SERVICE CMNT-IMP: ABNORMAL
SODIUM SERPL-SCNC: 133 MMOL/L (ref 136–145)
SPECIMEN HOLD: NORMAL
SPECIMEN TYPE: ABNORMAL
UFH PPP CHRO-ACNC: 0.4 IU/ML
WBC # BLD AUTO: 10.3 K/UL (ref 4.1–11.1)

## 2023-05-05 PROCEDURE — 36415 COLL VENOUS BLD VENIPUNCTURE: CPT

## 2023-05-05 PROCEDURE — 74011250636 HC RX REV CODE- 250/636: Performed by: STUDENT IN AN ORGANIZED HEALTH CARE EDUCATION/TRAINING PROGRAM

## 2023-05-05 PROCEDURE — 74011250637 HC RX REV CODE- 250/637: Performed by: STUDENT IN AN ORGANIZED HEALTH CARE EDUCATION/TRAINING PROGRAM

## 2023-05-05 PROCEDURE — 80048 BASIC METABOLIC PNL TOTAL CA: CPT

## 2023-05-05 PROCEDURE — 9990 CHARGE CONVERSION

## 2023-05-05 PROCEDURE — 90935 HEMODIALYSIS ONE EVALUATION: CPT

## 2023-05-05 PROCEDURE — 74011250637 HC RX REV CODE- 250/637: Performed by: HOSPITALIST

## 2023-05-05 PROCEDURE — 97530 THERAPEUTIC ACTIVITIES: CPT

## 2023-05-05 PROCEDURE — 5A1D70Z PERFORMANCE OF URINARY FILTRATION, INTERMITTENT, LESS THAN 6 HOURS PER DAY: ICD-10-PCS | Performed by: STUDENT IN AN ORGANIZED HEALTH CARE EDUCATION/TRAINING PROGRAM

## 2023-05-05 PROCEDURE — 85520 HEPARIN ASSAY: CPT

## 2023-05-05 PROCEDURE — 65270000046 HC RM TELEMETRY

## 2023-05-05 PROCEDURE — 82803 BLOOD GASES ANY COMBINATION: CPT

## 2023-05-05 PROCEDURE — 74011250636 HC RX REV CODE- 250/636: Performed by: INTERNAL MEDICINE

## 2023-05-05 PROCEDURE — 82140 ASSAY OF AMMONIA: CPT

## 2023-05-05 PROCEDURE — 74011000250 HC RX REV CODE- 250: Performed by: STUDENT IN AN ORGANIZED HEALTH CARE EDUCATION/TRAINING PROGRAM

## 2023-05-05 PROCEDURE — 74011000250 HC RX REV CODE- 250: Performed by: HOSPITALIST

## 2023-05-05 PROCEDURE — 74011000258 HC RX REV CODE- 258: Performed by: STUDENT IN AN ORGANIZED HEALTH CARE EDUCATION/TRAINING PROGRAM

## 2023-05-05 PROCEDURE — 85025 COMPLETE CBC W/AUTO DIFF WBC: CPT

## 2023-05-05 RX ORDER — POLYETHYLENE GLYCOL 3350 17 G/17G
17 POWDER, FOR SOLUTION ORAL DAILY PRN
Status: DISCONTINUED | OUTPATIENT
Start: 2023-05-06 | End: 2023-05-07 | Stop reason: HOSPADM

## 2023-05-05 RX ORDER — ACETAMINOPHEN 650 MG/1
650 SUPPOSITORY RECTAL EVERY 6 HOURS PRN
Status: DISCONTINUED | OUTPATIENT
Start: 2023-05-06 | End: 2023-05-07 | Stop reason: HOSPADM

## 2023-05-05 RX ORDER — SODIUM CHLORIDE 0.9 % (FLUSH) 0.9 %
5-40 SYRINGE (ML) INJECTION EVERY 8 HOURS
Status: DISCONTINUED | OUTPATIENT
Start: 2023-05-06 | End: 2023-05-07 | Stop reason: HOSPADM

## 2023-05-05 RX ORDER — ONDANSETRON 2 MG/ML
4 INJECTION INTRAMUSCULAR; INTRAVENOUS EVERY 6 HOURS PRN
Status: DISCONTINUED | OUTPATIENT
Start: 2023-05-06 | End: 2023-05-07 | Stop reason: HOSPADM

## 2023-05-05 RX ORDER — ACETAMINOPHEN 325 MG/1
650 TABLET ORAL EVERY 6 HOURS PRN
Status: DISCONTINUED | OUTPATIENT
Start: 2023-05-06 | End: 2023-05-07 | Stop reason: HOSPADM

## 2023-05-05 RX ORDER — SODIUM CHLORIDE 0.9 % (FLUSH) 0.9 %
5-40 SYRINGE (ML) INJECTION PRN
Status: DISCONTINUED | OUTPATIENT
Start: 2023-05-06 | End: 2023-05-07 | Stop reason: HOSPADM

## 2023-05-05 RX ORDER — IPRATROPIUM BROMIDE AND ALBUTEROL SULFATE 2.5; .5 MG/3ML; MG/3ML
1 SOLUTION RESPIRATORY (INHALATION) EVERY 6 HOURS PRN
Status: DISCONTINUED | OUTPATIENT
Start: 2023-05-06 | End: 2023-05-07 | Stop reason: HOSPADM

## 2023-05-05 RX ORDER — ROSUVASTATIN CALCIUM 10 MG/1
20 TABLET, COATED ORAL NIGHTLY
Status: DISCONTINUED | OUTPATIENT
Start: 2023-05-06 | End: 2023-05-07 | Stop reason: HOSPADM

## 2023-05-05 RX ORDER — SODIUM CHLORIDE 9 MG/ML
INJECTION, SOLUTION INTRAVENOUS PRN
Status: DISCONTINUED | OUTPATIENT
Start: 2023-05-06 | End: 2023-05-07 | Stop reason: HOSPADM

## 2023-05-05 RX ORDER — ALPRAZOLAM 0.25 MG/1
0.25 TABLET ORAL ONCE
Status: DISCONTINUED | OUTPATIENT
Start: 2023-05-05 | End: 2023-05-05

## 2023-05-05 RX ORDER — MONTELUKAST SODIUM 10 MG/1
10 TABLET ORAL DAILY
Status: DISCONTINUED | OUTPATIENT
Start: 2023-05-06 | End: 2023-05-07 | Stop reason: HOSPADM

## 2023-05-05 RX ORDER — ONDANSETRON 4 MG/1
4 TABLET, ORALLY DISINTEGRATING ORAL EVERY 8 HOURS PRN
Status: DISCONTINUED | OUTPATIENT
Start: 2023-05-06 | End: 2023-05-07 | Stop reason: HOSPADM

## 2023-05-05 RX ORDER — DOXYCYCLINE 100 MG/1
100 TABLET ORAL 2 TIMES DAILY
Qty: 8 TABLET | Refills: 0 | Status: SHIPPED | OUTPATIENT
Start: 2023-05-05 | End: 2023-05-09

## 2023-05-05 RX ORDER — ASPIRIN 81 MG/1
81 TABLET ORAL DAILY
Status: DISCONTINUED | OUTPATIENT
Start: 2023-05-06 | End: 2023-05-07 | Stop reason: HOSPADM

## 2023-05-05 RX ADMIN — SODIUM CHLORIDE 1 G: 9 INJECTION INTRAMUSCULAR; INTRAVENOUS; SUBCUTANEOUS at 15:52

## 2023-05-05 RX ADMIN — MONTELUKAST 10 MG: 10 TABLET, FILM COATED ORAL at 12:35

## 2023-05-05 RX ADMIN — EPOETIN ALFA-EPBX 10000 UNITS: 10000 INJECTION, SOLUTION INTRAVENOUS; SUBCUTANEOUS at 23:50

## 2023-05-05 RX ADMIN — SODIUM CHLORIDE, PRESERVATIVE FREE 10 ML: 5 INJECTION INTRAVENOUS at 07:06

## 2023-05-05 RX ADMIN — SODIUM CHLORIDE, PRESERVATIVE FREE 10 ML: 5 INJECTION INTRAVENOUS at 23:50

## 2023-05-05 RX ADMIN — ACETAMINOPHEN 650 MG: 325 TABLET ORAL at 15:38

## 2023-05-05 RX ADMIN — ASPIRIN 81 MG: 81 TABLET, COATED ORAL at 12:35

## 2023-05-05 RX ADMIN — DOXYCYCLINE 100 MG: 100 INJECTION, POWDER, LYOPHILIZED, FOR SOLUTION INTRAVENOUS at 02:44

## 2023-05-05 RX ADMIN — SODIUM CHLORIDE, PRESERVATIVE FREE 10 ML: 5 INJECTION INTRAVENOUS at 14:00

## 2023-05-05 RX ADMIN — DOXYCYCLINE 100 MG: 100 INJECTION, POWDER, LYOPHILIZED, FOR SOLUTION INTRAVENOUS at 14:41

## 2023-05-06 ENCOUNTER — APPOINTMENT (OUTPATIENT)
Facility: HOSPITAL | Age: 68
DRG: 640 | End: 2023-05-06
Attending: HOSPITALIST
Payer: MEDICARE

## 2023-05-06 PROCEDURE — 6360000002 HC RX W HCPCS: Performed by: HOSPITALIST

## 2023-05-06 PROCEDURE — 2500000003 HC RX 250 WO HCPCS: Performed by: STUDENT IN AN ORGANIZED HEALTH CARE EDUCATION/TRAINING PROGRAM

## 2023-05-06 PROCEDURE — 6360000002 HC RX W HCPCS: Performed by: STUDENT IN AN ORGANIZED HEALTH CARE EDUCATION/TRAINING PROGRAM

## 2023-05-06 PROCEDURE — 76937 US GUIDE VASCULAR ACCESS: CPT

## 2023-05-06 PROCEDURE — 2580000003 HC RX 258: Performed by: HOSPITALIST

## 2023-05-06 PROCEDURE — 2580000003 HC RX 258: Performed by: STUDENT IN AN ORGANIZED HEALTH CARE EDUCATION/TRAINING PROGRAM

## 2023-05-06 PROCEDURE — 6370000000 HC RX 637 (ALT 250 FOR IP): Performed by: STUDENT IN AN ORGANIZED HEALTH CARE EDUCATION/TRAINING PROGRAM

## 2023-05-06 PROCEDURE — 1100000003 HC PRIVATE W/ TELEMETRY

## 2023-05-06 RX ORDER — LORAZEPAM 2 MG/ML
2 INJECTION INTRAMUSCULAR
Status: COMPLETED | OUTPATIENT
Start: 2023-05-06 | End: 2023-05-06

## 2023-05-06 RX ADMIN — CEFTRIAXONE SODIUM 1000 MG: 1 INJECTION, POWDER, FOR SOLUTION INTRAMUSCULAR; INTRAVENOUS at 17:46

## 2023-05-06 RX ADMIN — DOXYCYCLINE 100 MG: 100 INJECTION, POWDER, LYOPHILIZED, FOR SOLUTION INTRAVENOUS at 16:30

## 2023-05-06 RX ADMIN — MONTELUKAST 10 MG: 10 TABLET, FILM COATED ORAL at 09:15

## 2023-05-06 RX ADMIN — ASPIRIN 81 MG: 81 TABLET, COATED ORAL at 09:15

## 2023-05-06 RX ADMIN — ROSUVASTATIN 20 MG: 10 TABLET, FILM COATED ORAL at 21:00

## 2023-05-06 RX ADMIN — LORAZEPAM 2 MG: 2 INJECTION INTRAMUSCULAR; INTRAVENOUS at 12:06

## 2023-05-07 VITALS
OXYGEN SATURATION: 96 % | BODY MASS INDEX: 26.62 KG/M2 | HEART RATE: 96 BPM | RESPIRATION RATE: 21 BRPM | WEIGHT: 164.9 LBS | TEMPERATURE: 98.7 F | DIASTOLIC BLOOD PRESSURE: 70 MMHG | SYSTOLIC BLOOD PRESSURE: 117 MMHG

## 2023-05-07 LAB
ANION GAP SERPL CALC-SCNC: 5 MMOL/L (ref 5–15)
BASOPHILS # BLD: 0.1 K/UL (ref 0–0.1)
BASOPHILS NFR BLD: 1 % (ref 0–1)
BUN SERPL-MCNC: 50 MG/DL (ref 6–20)
BUN/CREAT SERPL: 6 (ref 12–20)
CALCIUM SERPL-MCNC: 8.8 MG/DL (ref 8.5–10.1)
CHLORIDE SERPL-SCNC: 101 MMOL/L (ref 97–108)
CO2 SERPL-SCNC: 29 MMOL/L (ref 21–32)
CREAT SERPL-MCNC: 8.78 MG/DL (ref 0.7–1.3)
DIFFERENTIAL METHOD BLD: ABNORMAL
EOSINOPHIL # BLD: 1.8 K/UL (ref 0–0.4)
EOSINOPHIL NFR BLD: 18 % (ref 0–7)
ERYTHROCYTE [DISTWIDTH] IN BLOOD BY AUTOMATED COUNT: 15.4 % (ref 11.5–14.5)
GLUCOSE SERPL-MCNC: 83 MG/DL (ref 65–100)
HCT VFR BLD AUTO: 26 % (ref 36.6–50.3)
HGB BLD-MCNC: 7.8 G/DL (ref 12.1–17)
IMM GRANULOCYTES # BLD AUTO: 0.1 K/UL (ref 0–0.04)
IMM GRANULOCYTES NFR BLD AUTO: 1 % (ref 0–0.5)
LYMPHOCYTES # BLD: 1.5 K/UL (ref 0.8–3.5)
LYMPHOCYTES NFR BLD: 15 % (ref 12–49)
MCH RBC QN AUTO: 33.1 PG (ref 26–34)
MCHC RBC AUTO-ENTMCNC: 30 G/DL (ref 30–36.5)
MCV RBC AUTO: 110.2 FL (ref 80–99)
MONOCYTES # BLD: 1.3 K/UL (ref 0–1)
MONOCYTES NFR BLD: 13 % (ref 5–13)
NEUTS SEG # BLD: 5 K/UL (ref 1.8–8)
NEUTS SEG NFR BLD: 52 % (ref 32–75)
NRBC # BLD: 0 K/UL (ref 0–0.01)
NRBC BLD-RTO: 0 PER 100 WBC
PLATELET # BLD AUTO: 456 K/UL (ref 150–400)
PLATELET COMMENT: ABNORMAL
PMV BLD AUTO: 10.5 FL (ref 8.9–12.9)
POTASSIUM SERPL-SCNC: 4.7 MMOL/L (ref 3.5–5.1)
RBC # BLD AUTO: 2.36 M/UL (ref 4.1–5.7)
RBC MORPH BLD: ABNORMAL
RBC MORPH BLD: ABNORMAL
SODIUM SERPL-SCNC: 135 MMOL/L (ref 136–145)
UFH PPP CHRO-ACNC: <0.1 IU/ML
WBC # BLD AUTO: 9.8 K/UL (ref 4.1–11.1)

## 2023-05-07 PROCEDURE — 2500000003 HC RX 250 WO HCPCS: Performed by: STUDENT IN AN ORGANIZED HEALTH CARE EDUCATION/TRAINING PROGRAM

## 2023-05-07 PROCEDURE — 85520 HEPARIN ASSAY: CPT

## 2023-05-07 PROCEDURE — 6370000000 HC RX 637 (ALT 250 FOR IP): Performed by: STUDENT IN AN ORGANIZED HEALTH CARE EDUCATION/TRAINING PROGRAM

## 2023-05-07 PROCEDURE — 80048 BASIC METABOLIC PNL TOTAL CA: CPT

## 2023-05-07 PROCEDURE — 85025 COMPLETE CBC W/AUTO DIFF WBC: CPT

## 2023-05-07 PROCEDURE — 2580000003 HC RX 258: Performed by: STUDENT IN AN ORGANIZED HEALTH CARE EDUCATION/TRAINING PROGRAM

## 2023-05-07 PROCEDURE — 36415 COLL VENOUS BLD VENIPUNCTURE: CPT

## 2023-05-07 RX ORDER — DOXYCYCLINE HYCLATE 100 MG/1
100 CAPSULE ORAL 2 TIMES DAILY
Qty: 2 CAPSULE | Refills: 0 | Status: SHIPPED | OUTPATIENT
Start: 2023-05-07 | End: 2023-05-08

## 2023-05-07 RX ORDER — CEFUROXIME AXETIL 250 MG/1
250 TABLET ORAL 2 TIMES DAILY
Qty: 8 TABLET | Refills: 0 | Status: SHIPPED | OUTPATIENT
Start: 2023-05-07 | End: 2023-05-11

## 2023-05-07 RX ADMIN — MONTELUKAST 10 MG: 10 TABLET, FILM COATED ORAL at 09:07

## 2023-05-07 RX ADMIN — SODIUM CHLORIDE, PRESERVATIVE FREE 10 ML: 5 INJECTION INTRAVENOUS at 02:27

## 2023-05-07 RX ADMIN — DOXYCYCLINE 100 MG: 100 INJECTION, POWDER, LYOPHILIZED, FOR SOLUTION INTRAVENOUS at 06:35

## 2023-05-07 RX ADMIN — ASPIRIN 81 MG: 81 TABLET, COATED ORAL at 09:07

## 2023-05-10 LAB
BACTERIA SPEC CULT: NORMAL
SERVICE CMNT-IMP: NORMAL

## 2023-05-25 ENCOUNTER — TRANSCRIBE ORDERS (OUTPATIENT)
Facility: HOSPITAL | Age: 68
End: 2023-05-25

## 2023-05-25 DIAGNOSIS — R41.3 MEMORY LOSS: Primary | ICD-10-CM

## 2023-06-02 PROBLEM — R77.8 ELEVATED TROPONIN: Status: RESOLVED | Noted: 2023-05-03 | Resolved: 2023-06-02

## (undated) DEVICE — 6FR THERMODILUTION BALLOON CATHETER SWAN (ARROW)

## (undated) DEVICE — DRAPE PRB US TRNSDCR 6X96IN --

## (undated) DEVICE — STERILE POLYISOPRENE POWDER-FREE SURGICAL GLOVES WITH EMOLLIENT COATING: Brand: PROTEXIS

## (undated) DEVICE — GARMENT,MEDLINE,DVT,INT,CALF,MED, GEN2: Brand: MEDLINE

## (undated) DEVICE — PACK PROCEDURE SURG HRT CATH

## (undated) DEVICE — INFECTION CONTROL KIT SYS

## (undated) DEVICE — KIT HND CTRL 3 W STPCOCK ROT END 54IN PREM HI PRSS TBNG AT

## (undated) DEVICE — CO-SET DELIVERY SYSTEM FOR 123 ROOM TEMPATURE INJECTATE: Brand: CO-SET+

## (undated) DEVICE — SOLUTION IRRIG 1000ML H2O STRL BLT

## (undated) DEVICE — SUTURE VCRL SZ 3-0 L27IN ABSRB UD FS-2 L19MM 1/2 CIR J423H

## (undated) DEVICE — SUTURE VCRL SZ 3-0 L27IN ABSRB UD L26MM SH 1/2 CIR J416H

## (undated) DEVICE — SOLUTION IV 500ML 0.9% SOD CHL FLX CONT

## (undated) DEVICE — HANDLE LT SNAP ON ULT DURABLE LENS FOR TRUMPF ALC DISPOSABLE

## (undated) DEVICE — REM POLYHESIVE ADULT PATIENT RETURN ELECTRODE: Brand: VALLEYLAB

## (undated) DEVICE — (D)PREP SKN CHLRAPRP APPL 26ML -- CONVERT TO ITEM 371833

## (undated) DEVICE — SUTURE NONABSORBABLE MONOFILAMENT 4-0 CV-5 PT-13 24 IN GORTX 5K08B

## (undated) DEVICE — Device

## (undated) DEVICE — PINNACLE PRECISION ACCESS SYSTEM INTRODUCER SHEATH: Brand: PINNACLE PRECISION ACCESS SYSTEM

## (undated) DEVICE — DRAPE,REIN 53X77,STERILE: Brand: MEDLINE

## (undated) DEVICE — STRAP,POSITIONING,KNEE/BODY,FOAM,4X60": Brand: MEDLINE

## (undated) DEVICE — PINNACLE INTRODUCER SHEATH: Brand: PINNACLE

## (undated) DEVICE — MEDI-VAC YANK SUCT HNDL W/TPRD BULBOUS TIP: Brand: CARDINAL HEALTH

## (undated) DEVICE — SPONGE GZ W4XL4IN COT 12 PLY TYP VII WVN C FLD DSGN

## (undated) DEVICE — KIT MFLD ISOLATN NACL CNTRST PRT TBNG SPIK W/ PRSS TRNSDUC